# Patient Record
Sex: FEMALE | Race: WHITE | Employment: FULL TIME | URBAN - METROPOLITAN AREA
[De-identification: names, ages, dates, MRNs, and addresses within clinical notes are randomized per-mention and may not be internally consistent; named-entity substitution may affect disease eponyms.]

---

## 2017-02-21 ENCOUNTER — HOSPITAL ENCOUNTER (OUTPATIENT)
Dept: MAMMOGRAPHY | Age: 67
Discharge: HOME | End: 2017-02-21
Attending: INTERNAL MEDICINE | Admitting: INTERNAL MEDICINE

## 2017-02-21 ENCOUNTER — HOSPITAL ENCOUNTER (OUTPATIENT)
Dept: OTHER | Age: 67
Discharge: HOME | End: 2017-02-21
Attending: NURSE PRACTITIONER | Admitting: NURSE PRACTITIONER

## 2017-02-21 DIAGNOSIS — Z80.3 FAMILY HISTORY OF BREAST CANCER IN MOTHER: ICD-10-CM

## 2017-02-21 DIAGNOSIS — Z12.31 ENCOUNTER FOR SCREENING MAMMOGRAM FOR BREAST CANCER: ICD-10-CM

## 2017-02-21 LAB
ALBUMIN SERPL-MCNC: 4 G/DL (ref 3.4–5)
ALP BLD-CCNC: 80 U/L (ref 40–129)
ALT SERPL-CCNC: 13 U/L (ref 10–40)
ANION GAP SERPL CALCULATED.3IONS-SCNC: 15 MMOL/L (ref 3–16)
AST SERPL-CCNC: 18 U/L (ref 15–37)
BACTERIA: ABNORMAL /HPF
BASOPHILS ABSOLUTE: 0 K/UL (ref 0–0.2)
BASOPHILS RELATIVE PERCENT: 0.1 %
BILIRUB SERPL-MCNC: 0.5 MG/DL (ref 0–1)
BILIRUBIN DIRECT: <0.2 MG/DL (ref 0–0.3)
BILIRUBIN URINE: NEGATIVE
BILIRUBIN, INDIRECT: NORMAL MG/DL (ref 0–1)
BLOOD, URINE: ABNORMAL
BUN BLDV-MCNC: 18 MG/DL (ref 7–20)
CALCIUM SERPL-MCNC: 9.2 MG/DL (ref 8.3–10.6)
CHLORIDE BLD-SCNC: 103 MMOL/L (ref 99–110)
CLARITY: CLEAR
CO2: 23 MMOL/L (ref 21–32)
COLOR: YELLOW
CREAT SERPL-MCNC: 0.8 MG/DL (ref 0.6–1.2)
CREATININE URINE: 16.8 MG/DL (ref 28–259)
EOSINOPHILS ABSOLUTE: 0.1 K/UL (ref 0–0.6)
EOSINOPHILS RELATIVE PERCENT: 1.9 %
EPITHELIAL CELLS, UA: 1 /HPF (ref 0–5)
GFR AFRICAN AMERICAN: >60
GFR NON-AFRICAN AMERICAN: >60
GLUCOSE BLD-MCNC: 112 MG/DL (ref 70–99)
GLUCOSE URINE: NEGATIVE MG/DL
HCT VFR BLD CALC: 40.9 % (ref 36–48)
HEMOGLOBIN: 13.5 G/DL (ref 12–16)
HYALINE CASTS: 1 /HPF (ref 0–8)
KETONES, URINE: NEGATIVE MG/DL
LEUKOCYTE ESTERASE, URINE: NEGATIVE
LYMPHOCYTES ABSOLUTE: 0.8 K/UL (ref 1–5.1)
LYMPHOCYTES RELATIVE PERCENT: 15.8 %
MAGNESIUM: 1.8 MG/DL (ref 1.8–2.4)
MCH RBC QN AUTO: 28.6 PG (ref 26–34)
MCHC RBC AUTO-ENTMCNC: 32.9 G/DL (ref 31–36)
MCV RBC AUTO: 86.9 FL (ref 80–100)
MICROSCOPIC EXAMINATION: YES
MONOCYTES ABSOLUTE: 0.6 K/UL (ref 0–1.3)
MONOCYTES RELATIVE PERCENT: 10.9 %
NEUTROPHILS ABSOLUTE: 3.7 K/UL (ref 1.7–7.7)
NEUTROPHILS RELATIVE PERCENT: 71.3 %
NITRITE, URINE: NEGATIVE
PDW BLD-RTO: 13.5 % (ref 12.4–15.4)
PH UA: 6.5
PHOSPHORUS: 3 MG/DL (ref 2.5–4.9)
PLATELET # BLD: 196 K/UL (ref 135–450)
PMV BLD AUTO: 8.8 FL (ref 5–10.5)
POTASSIUM SERPL-SCNC: 4.6 MMOL/L (ref 3.5–5.1)
PROTEIN PROTEIN: 54 MG/DL
PROTEIN UA: 100 MG/DL
RBC # BLD: 4.71 M/UL (ref 4–5.2)
RBC UA: 3 /HPF (ref 0–4)
SODIUM BLD-SCNC: 141 MMOL/L (ref 136–145)
SPECIFIC GRAVITY UA: 1.01
TACROLIMUS BLOOD: 6.4 NG/ML (ref 5–20)
TOTAL PROTEIN: 6.6 G/DL (ref 6.4–8.2)
URINE REFLEX TO CULTURE: ABNORMAL
URINE TYPE: ABNORMAL
UROBILINOGEN, URINE: 0.2 E.U./DL
WBC # BLD: 5.2 K/UL (ref 4–11)
WBC UA: 5 /HPF (ref 0–5)

## 2017-06-09 ENCOUNTER — HOSPITAL ENCOUNTER (OUTPATIENT)
Dept: OTHER | Age: 67
Discharge: HOME | End: 2017-06-09
Attending: NURSE PRACTITIONER | Admitting: NURSE PRACTITIONER

## 2017-06-09 LAB
ALBUMIN SERPL-MCNC: 4.1 G/DL (ref 3.4–5)
ALP BLD-CCNC: 77 U/L (ref 40–129)
ALT SERPL-CCNC: 13 U/L (ref 10–40)
ANION GAP SERPL CALCULATED.3IONS-SCNC: 11 MMOL/L (ref 3–16)
AST SERPL-CCNC: 14 U/L (ref 15–37)
BACTERIA: ABNORMAL /HPF
BASOPHILS ABSOLUTE: 0 K/UL (ref 0–0.2)
BASOPHILS RELATIVE PERCENT: 0.2 %
BILIRUB SERPL-MCNC: 0.4 MG/DL (ref 0–1)
BILIRUBIN DIRECT: <0.2 MG/DL (ref 0–0.3)
BILIRUBIN URINE: NEGATIVE
BILIRUBIN, INDIRECT: ABNORMAL MG/DL (ref 0–1)
BLOOD, URINE: ABNORMAL
BUN BLDV-MCNC: 27 MG/DL (ref 7–20)
CALCIUM SERPL-MCNC: 9.2 MG/DL (ref 8.3–10.6)
CHLORIDE BLD-SCNC: 107 MMOL/L (ref 99–110)
CHOLESTEROL, TOTAL: 163 MG/DL (ref 0–199)
CLARITY: CLEAR
CO2: 27 MMOL/L (ref 21–32)
COLOR: YELLOW
CREAT SERPL-MCNC: 0.9 MG/DL (ref 0.6–1.2)
CREATININE URINE: 35.8 MG/DL (ref 28–259)
EOSINOPHILS ABSOLUTE: 0 K/UL (ref 0–0.6)
EOSINOPHILS RELATIVE PERCENT: 0.8 %
EPITHELIAL CELLS, UA: 1 /HPF (ref 0–5)
GFR AFRICAN AMERICAN: >60
GFR NON-AFRICAN AMERICAN: >60
GLUCOSE BLD-MCNC: 132 MG/DL (ref 70–99)
GLUCOSE URINE: NEGATIVE MG/DL
HCT VFR BLD CALC: 42.1 % (ref 36–48)
HDLC SERPL-MCNC: 62 MG/DL (ref 40–60)
HEMOGLOBIN: 13.7 G/DL (ref 12–16)
HYALINE CASTS: 0 /LPF (ref 0–8)
KETONES, URINE: NEGATIVE MG/DL
LDL CHOLESTEROL CALCULATED: 80 MG/DL
LEUKOCYTE ESTERASE, URINE: ABNORMAL
LYMPHOCYTES ABSOLUTE: 1 K/UL (ref 1–5.1)
LYMPHOCYTES RELATIVE PERCENT: 17 %
MAGNESIUM: 2.1 MG/DL (ref 1.8–2.4)
MCH RBC QN AUTO: 28.2 PG (ref 26–34)
MCHC RBC AUTO-ENTMCNC: 32.7 G/DL (ref 31–36)
MCV RBC AUTO: 86.2 FL (ref 80–100)
MICROSCOPIC EXAMINATION: YES
MONOCYTES ABSOLUTE: 0.5 K/UL (ref 0–1.3)
MONOCYTES RELATIVE PERCENT: 9.2 %
NEUTROPHILS ABSOLUTE: 4.1 K/UL (ref 1.7–7.7)
NEUTROPHILS RELATIVE PERCENT: 72.8 %
NITRITE, URINE: NEGATIVE
PDW BLD-RTO: 13.6 % (ref 12.4–15.4)
PH UA: 6.5
PHOSPHORUS: 3.5 MG/DL (ref 2.5–4.9)
PLATELET # BLD: 196 K/UL (ref 135–450)
PMV BLD AUTO: 8.6 FL (ref 5–10.5)
POTASSIUM SERPL-SCNC: 5.5 MMOL/L (ref 3.5–5.1)
PROTEIN PROTEIN: 14 MG/DL
PROTEIN UA: NEGATIVE MG/DL
RBC # BLD: 4.88 M/UL (ref 4–5.2)
RBC UA: 3 /HPF (ref 0–4)
SODIUM BLD-SCNC: 145 MMOL/L (ref 136–145)
SPECIFIC GRAVITY UA: 1.01
TACROLIMUS BLOOD: 5.8 NG/ML (ref 5–20)
TOTAL PROTEIN: 6.7 G/DL (ref 6.4–8.2)
TRIGL SERPL-MCNC: 106 MG/DL (ref 0–150)
URINE REFLEX TO CULTURE: YES
URINE TYPE: ABNORMAL
UROBILINOGEN, URINE: 0.2 E.U./DL
VLDLC SERPL CALC-MCNC: 21 MG/DL
WBC # BLD: 5.6 K/UL (ref 4–11)
WBC UA: 5 /HPF (ref 0–5)

## 2017-06-11 LAB
ORGANISM: ABNORMAL
URINE CULTURE, ROUTINE: ABNORMAL

## 2017-07-01 ENCOUNTER — HOSPITAL ENCOUNTER (OUTPATIENT)
Dept: OTHER | Age: 67
Discharge: HOME | End: 2017-07-01
Attending: NURSE PRACTITIONER | Admitting: NURSE PRACTITIONER

## 2017-07-01 LAB
ALBUMIN SERPL-MCNC: 4.2 G/DL (ref 3.4–5)
ANION GAP SERPL CALCULATED.3IONS-SCNC: 17 MMOL/L (ref 3–16)
BUN BLDV-MCNC: 22 MG/DL (ref 7–20)
CALCIUM SERPL-MCNC: 9.7 MG/DL (ref 8.3–10.6)
CHLORIDE BLD-SCNC: 107 MMOL/L (ref 99–110)
CO2: 23 MMOL/L (ref 21–32)
CREAT SERPL-MCNC: 0.8 MG/DL (ref 0.6–1.2)
GFR AFRICAN AMERICAN: >60
GFR NON-AFRICAN AMERICAN: >60
GLUCOSE BLD-MCNC: 124 MG/DL (ref 70–99)
PHOSPHORUS: 2.9 MG/DL (ref 2.5–4.9)
POTASSIUM SERPL-SCNC: 4.6 MMOL/L (ref 3.5–5.1)
SODIUM BLD-SCNC: 147 MMOL/L (ref 136–145)

## 2017-09-08 ENCOUNTER — HOSPITAL ENCOUNTER (OUTPATIENT)
Dept: OTHER | Age: 67
Discharge: HOME | End: 2017-09-08
Attending: NURSE PRACTITIONER | Admitting: NURSE PRACTITIONER

## 2017-09-08 LAB
ALBUMIN SERPL-MCNC: 4.1 G/DL (ref 3.4–5)
ALP BLD-CCNC: 73 U/L (ref 40–129)
ALT SERPL-CCNC: 15 U/L (ref 10–40)
ANION GAP SERPL CALCULATED.3IONS-SCNC: 15 MMOL/L (ref 3–16)
AST SERPL-CCNC: 18 U/L (ref 15–37)
BASOPHILS ABSOLUTE: 0 K/UL (ref 0–0.2)
BASOPHILS RELATIVE PERCENT: 0.2 %
BILIRUB SERPL-MCNC: 0.6 MG/DL (ref 0–1)
BILIRUBIN DIRECT: <0.2 MG/DL (ref 0–0.3)
BILIRUBIN URINE: NEGATIVE
BILIRUBIN, INDIRECT: NORMAL MG/DL (ref 0–1)
BLOOD, URINE: ABNORMAL
BUN BLDV-MCNC: 24 MG/DL (ref 7–20)
CALCIUM SERPL-MCNC: 9.7 MG/DL (ref 8.3–10.6)
CHLORIDE BLD-SCNC: 101 MMOL/L (ref 99–110)
CHOLESTEROL, TOTAL: 138 MG/DL (ref 0–199)
CLARITY: ABNORMAL
CO2: 25 MMOL/L (ref 21–32)
COLOR: YELLOW
CREAT SERPL-MCNC: 0.8 MG/DL (ref 0.6–1.2)
CREATININE URINE: 151.6 MG/DL (ref 28–259)
EOSINOPHILS ABSOLUTE: 0.1 K/UL (ref 0–0.6)
EOSINOPHILS RELATIVE PERCENT: 1.9 %
EPITHELIAL CELLS, UA: 8 /HPF (ref 0–5)
GFR AFRICAN AMERICAN: >60
GFR NON-AFRICAN AMERICAN: >60
GLUCOSE BLD-MCNC: 125 MG/DL (ref 70–99)
GLUCOSE URINE: NEGATIVE MG/DL
HCT VFR BLD CALC: 41 % (ref 36–48)
HDLC SERPL-MCNC: 50 MG/DL (ref 40–60)
HEMOGLOBIN: 13.7 G/DL (ref 12–16)
KETONES, URINE: NEGATIVE MG/DL
LDL CHOLESTEROL CALCULATED: 67 MG/DL
LEUKOCYTE ESTERASE, URINE: ABNORMAL
LYMPHOCYTES ABSOLUTE: 0.9 K/UL (ref 1–5.1)
LYMPHOCYTES RELATIVE PERCENT: 20.9 %
MAGNESIUM: 1.8 MG/DL (ref 1.8–2.4)
MCH RBC QN AUTO: 29.3 PG (ref 26–34)
MCHC RBC AUTO-ENTMCNC: 33.4 G/DL (ref 31–36)
MCV RBC AUTO: 87.5 FL (ref 80–100)
MICROSCOPIC EXAMINATION: YES
MONOCYTES ABSOLUTE: 0.5 K/UL (ref 0–1.3)
MONOCYTES RELATIVE PERCENT: 12.2 %
NEUTROPHILS ABSOLUTE: 2.9 K/UL (ref 1.7–7.7)
NEUTROPHILS RELATIVE PERCENT: 64.8 %
NITRITE, URINE: NEGATIVE
PDW BLD-RTO: 13.7 % (ref 12.4–15.4)
PH UA: 6
PHOSPHORUS: 3.4 MG/DL (ref 2.5–4.9)
PLATELET # BLD: 182 K/UL (ref 135–450)
PMV BLD AUTO: 8.8 FL (ref 5–10.5)
POTASSIUM SERPL-SCNC: 4.1 MMOL/L (ref 3.5–5.1)
PROTEIN PROTEIN: 112 MG/DL
PROTEIN UA: 100 MG/DL
RBC # BLD: 4.69 M/UL (ref 4–5.2)
RBC UA: 3 /HPF (ref 0–4)
SODIUM BLD-SCNC: 141 MMOL/L (ref 136–145)
SPECIFIC GRAVITY UA: 1.02
TACROLIMUS BLOOD: 14.4 NG/ML (ref 5–20)
TOTAL PROTEIN: 6.7 G/DL (ref 6.4–8.2)
TRIGL SERPL-MCNC: 103 MG/DL (ref 0–150)
URINE REFLEX TO CULTURE: YES
URINE TYPE: ABNORMAL
UROBILINOGEN, URINE: 0.2 E.U./DL
VLDLC SERPL CALC-MCNC: 21 MG/DL
WBC # BLD: 4.4 K/UL (ref 4–11)
WBC UA: 19 /HPF (ref 0–5)

## 2017-09-10 LAB
ORGANISM: ABNORMAL
URINE CULTURE, ROUTINE: ABNORMAL

## 2017-09-14 ENCOUNTER — HOSPITAL ENCOUNTER (OUTPATIENT)
Dept: OTHER | Age: 67
Discharge: HOME | End: 2017-09-14
Attending: NURSE PRACTITIONER | Admitting: NURSE PRACTITIONER

## 2017-09-14 LAB — TACROLIMUS BLOOD: 5.1 NG/ML (ref 5–20)

## 2018-01-19 ENCOUNTER — HOSPITAL ENCOUNTER (OUTPATIENT)
Dept: OTHER | Age: 68
Discharge: HOME | End: 2018-01-19
Attending: NURSE PRACTITIONER | Admitting: NURSE PRACTITIONER

## 2018-01-19 LAB
ALBUMIN SERPL-MCNC: 4.1 G/DL (ref 3.4–5)
ALP BLD-CCNC: 73 U/L (ref 40–129)
ALT SERPL-CCNC: 12 U/L (ref 10–40)
ANION GAP SERPL CALCULATED.3IONS-SCNC: 15 MMOL/L (ref 3–16)
AST SERPL-CCNC: 16 U/L (ref 15–37)
BASOPHILS ABSOLUTE: 0 K/UL (ref 0–0.2)
BASOPHILS RELATIVE PERCENT: 0.2 %
BILIRUB SERPL-MCNC: 0.5 MG/DL (ref 0–1)
BILIRUBIN DIRECT: <0.2 MG/DL (ref 0–0.3)
BILIRUBIN URINE: NEGATIVE
BILIRUBIN, INDIRECT: NORMAL MG/DL (ref 0–1)
BLOOD, URINE: NEGATIVE
BUN BLDV-MCNC: 23 MG/DL (ref 7–20)
CALCIUM SERPL-MCNC: 9.2 MG/DL (ref 8.3–10.6)
CHLORIDE BLD-SCNC: 104 MMOL/L (ref 99–110)
CHOLESTEROL, TOTAL: 131 MG/DL (ref 0–199)
CLARITY: CLEAR
CO2: 21 MMOL/L (ref 21–32)
COLOR: YELLOW
CREAT SERPL-MCNC: 1 MG/DL (ref 0.6–1.2)
CREATININE URINE: 27.9 MG/DL (ref 28–259)
EOSINOPHILS ABSOLUTE: 0.1 K/UL (ref 0–0.6)
EOSINOPHILS RELATIVE PERCENT: 1.9 %
EPITHELIAL CELLS, UA: 4 /HPF (ref 0–5)
ESTIMATED AVERAGE GLUCOSE: 125.5 MG/DL
GFR AFRICAN AMERICAN: >60
GFR NON-AFRICAN AMERICAN: 55
GLUCOSE BLD-MCNC: 120 MG/DL (ref 70–99)
GLUCOSE URINE: NEGATIVE MG/DL
HBA1C MFR BLD: 6 %
HCT VFR BLD CALC: 40 % (ref 36–48)
HDLC SERPL-MCNC: 40 MG/DL (ref 40–60)
HEMOGLOBIN: 13.3 G/DL (ref 12–16)
HYALINE CASTS: 2 /LPF (ref 0–8)
KETONES, URINE: NEGATIVE MG/DL
LDL CHOLESTEROL CALCULATED: 64 MG/DL
LEUKOCYTE ESTERASE, URINE: ABNORMAL
LYMPHOCYTES ABSOLUTE: 0.9 K/UL (ref 1–5.1)
LYMPHOCYTES RELATIVE PERCENT: 21.1 %
MAGNESIUM: 1.8 MG/DL (ref 1.8–2.4)
MCH RBC QN AUTO: 29 PG (ref 26–34)
MCHC RBC AUTO-ENTMCNC: 33.3 G/DL (ref 31–36)
MCV RBC AUTO: 87.2 FL (ref 80–100)
MICROSCOPIC EXAMINATION: YES
MONOCYTES ABSOLUTE: 0.6 K/UL (ref 0–1.3)
MONOCYTES RELATIVE PERCENT: 13.7 %
NEUTROPHILS ABSOLUTE: 2.7 K/UL (ref 1.7–7.7)
NEUTROPHILS RELATIVE PERCENT: 63.1 %
NITRITE, URINE: NEGATIVE
PDW BLD-RTO: 13.5 % (ref 12.4–15.4)
PH UA: 6
PHOSPHORUS: 2.5 MG/DL (ref 2.5–4.9)
PLATELET # BLD: 182 K/UL (ref 135–450)
PMV BLD AUTO: 8.6 FL (ref 5–10.5)
POTASSIUM SERPL-SCNC: 3.5 MMOL/L (ref 3.5–5.1)
PROTEIN PROTEIN: 8 MG/DL
PROTEIN UA: NEGATIVE MG/DL
RBC # BLD: 4.59 M/UL (ref 4–5.2)
RBC UA: 1 /HPF (ref 0–4)
SODIUM BLD-SCNC: 140 MMOL/L (ref 136–145)
SPECIFIC GRAVITY UA: 1.01
TACROLIMUS BLOOD: 6.4 NG/ML (ref 5–20)
TOTAL PROTEIN: 6.6 G/DL (ref 6.4–8.2)
TRIGL SERPL-MCNC: 133 MG/DL (ref 0–150)
URINE REFLEX TO CULTURE: YES
URINE TYPE: ABNORMAL
UROBILINOGEN, URINE: 0.2 E.U./DL
VLDLC SERPL CALC-MCNC: 27 MG/DL
WBC # BLD: 4.3 K/UL (ref 4–11)
WBC UA: 20 /HPF (ref 0–5)

## 2018-01-21 LAB
ORGANISM: ABNORMAL
URINE CULTURE, ROUTINE: ABNORMAL
URINE CULTURE, ROUTINE: ABNORMAL

## 2018-06-08 ENCOUNTER — HOSPITAL ENCOUNTER (OUTPATIENT)
Dept: OTHER | Age: 68
Discharge: HOME | End: 2018-06-08
Attending: NURSE PRACTITIONER | Admitting: NURSE PRACTITIONER

## 2018-06-08 LAB
ALBUMIN SERPL-MCNC: 4.4 G/DL (ref 3.4–5)
ALP BLD-CCNC: 81 U/L (ref 40–129)
ALT SERPL-CCNC: 14 U/L (ref 10–40)
ANION GAP SERPL CALCULATED.3IONS-SCNC: 15 MMOL/L (ref 3–16)
AST SERPL-CCNC: 20 U/L (ref 15–37)
BASOPHILS ABSOLUTE: 0 K/UL (ref 0–0.2)
BASOPHILS RELATIVE PERCENT: 0.2 %
BILIRUB SERPL-MCNC: 0.5 MG/DL (ref 0–1)
BILIRUBIN DIRECT: <0.2 MG/DL (ref 0–0.3)
BILIRUBIN URINE: NEGATIVE
BILIRUBIN, INDIRECT: NORMAL MG/DL (ref 0–1)
BLOOD, URINE: NEGATIVE
BUN BLDV-MCNC: 24 MG/DL (ref 7–20)
CALCIUM SERPL-MCNC: 9.5 MG/DL (ref 8.3–10.6)
CHLORIDE BLD-SCNC: 106 MMOL/L (ref 99–110)
CHOLESTEROL, TOTAL: 131 MG/DL (ref 0–199)
CLARITY: CLEAR
CO2: 24 MMOL/L (ref 21–32)
COLOR: YELLOW
CREAT SERPL-MCNC: 0.9 MG/DL (ref 0.6–1.2)
CREATININE URINE: 20.8 MG/DL (ref 28–259)
EOSINOPHILS ABSOLUTE: 0.1 K/UL (ref 0–0.6)
EOSINOPHILS RELATIVE PERCENT: 1.8 %
EPITHELIAL CELLS, UA: 2 /HPF (ref 0–5)
ESTIMATED AVERAGE GLUCOSE: 125.5 MG/DL
GFR AFRICAN AMERICAN: >60
GFR NON-AFRICAN AMERICAN: >60
GLUCOSE BLD-MCNC: 129 MG/DL (ref 70–99)
GLUCOSE URINE: NEGATIVE MG/DL
HBA1C MFR BLD: 6 %
HCT VFR BLD CALC: 41.4 % (ref 36–48)
HDLC SERPL-MCNC: 48 MG/DL (ref 40–60)
HEMOGLOBIN: 13.8 G/DL (ref 12–16)
HYALINE CASTS: 0 /LPF (ref 0–8)
KETONES, URINE: NEGATIVE MG/DL
LDL CHOLESTEROL CALCULATED: 61 MG/DL
LEUKOCYTE ESTERASE, URINE: ABNORMAL
LYMPHOCYTES ABSOLUTE: 0.9 K/UL (ref 1–5.1)
LYMPHOCYTES RELATIVE PERCENT: 21.7 %
MAGNESIUM: 1.9 MG/DL (ref 1.8–2.4)
MCH RBC QN AUTO: 29 PG (ref 26–34)
MCHC RBC AUTO-ENTMCNC: 33.4 G/DL (ref 31–36)
MCV RBC AUTO: 86.9 FL (ref 80–100)
MICROSCOPIC EXAMINATION: YES
MONOCYTES ABSOLUTE: 0.5 K/UL (ref 0–1.3)
MONOCYTES RELATIVE PERCENT: 11 %
NEUTROPHILS ABSOLUTE: 2.8 K/UL (ref 1.7–7.7)
NEUTROPHILS RELATIVE PERCENT: 65.3 %
NITRITE, URINE: NEGATIVE
PDW BLD-RTO: 13.8 % (ref 12.4–15.4)
PH UA: 6
PHOSPHORUS: 3.7 MG/DL (ref 2.5–4.9)
PLATELET # BLD: 193 K/UL (ref 135–450)
PMV BLD AUTO: 8.8 FL (ref 5–10.5)
POTASSIUM SERPL-SCNC: 4.4 MMOL/L (ref 3.5–5.1)
PROTEIN PROTEIN: 13 MG/DL
PROTEIN UA: NEGATIVE MG/DL
RBC # BLD: 4.76 M/UL (ref 4–5.2)
RBC UA: 2 /HPF (ref 0–4)
SODIUM BLD-SCNC: 145 MMOL/L (ref 136–145)
SPECIFIC GRAVITY UA: 1.01
TACROLIMUS BLOOD: 8.1 NG/ML (ref 5–20)
TOTAL PROTEIN: 6.9 G/DL (ref 6.4–8.2)
TRIGL SERPL-MCNC: 109 MG/DL (ref 0–150)
URINE REFLEX TO CULTURE: YES
URINE TYPE: ABNORMAL
UROBILINOGEN, URINE: 0.2 E.U./DL
VLDLC SERPL CALC-MCNC: 22 MG/DL
WBC # BLD: 4.3 K/UL (ref 4–11)
WBC UA: 4 /HPF (ref 0–5)

## 2018-06-09 LAB — URINE CULTURE, ROUTINE: NORMAL

## 2018-09-07 ENCOUNTER — HOSPITAL ENCOUNTER (OUTPATIENT)
Dept: OTHER | Age: 68
Discharge: HOME | End: 2018-09-07
Attending: NURSE PRACTITIONER | Admitting: NURSE PRACTITIONER

## 2018-09-07 LAB
ALBUMIN SERPL-MCNC: 4.1 G/DL (ref 3.4–5)
ALP BLD-CCNC: 86 U/L (ref 40–129)
ALT SERPL-CCNC: 13 U/L (ref 10–40)
ANION GAP SERPL CALCULATED.3IONS-SCNC: 13 MMOL/L (ref 3–16)
AST SERPL-CCNC: 17 U/L (ref 15–37)
BASOPHILS ABSOLUTE: 0 K/UL (ref 0–0.2)
BASOPHILS RELATIVE PERCENT: 0.2 %
BILIRUB SERPL-MCNC: 0.4 MG/DL (ref 0–1)
BILIRUBIN DIRECT: <0.2 MG/DL (ref 0–0.3)
BILIRUBIN URINE: NEGATIVE
BILIRUBIN, INDIRECT: NORMAL MG/DL (ref 0–1)
BLOOD, URINE: ABNORMAL
BUN BLDV-MCNC: 22 MG/DL (ref 7–20)
CALCIUM SERPL-MCNC: 9.4 MG/DL (ref 8.3–10.6)
CHLORIDE BLD-SCNC: 105 MMOL/L (ref 99–110)
CHOLESTEROL, TOTAL: 131 MG/DL (ref 0–199)
CLARITY: CLEAR
CO2: 23 MMOL/L (ref 21–32)
COLOR: YELLOW
CREAT SERPL-MCNC: 0.9 MG/DL (ref 0.6–1.2)
CREATININE URINE: 34.9 MG/DL (ref 28–259)
EOSINOPHILS ABSOLUTE: 0.1 K/UL (ref 0–0.6)
EOSINOPHILS RELATIVE PERCENT: 1.7 %
EPITHELIAL CELLS, UA: 1 /HPF (ref 0–5)
ESTIMATED AVERAGE GLUCOSE: 116.9 MG/DL
GFR AFRICAN AMERICAN: >60
GFR NON-AFRICAN AMERICAN: >60
GLUCOSE BLD-MCNC: 125 MG/DL (ref 70–99)
GLUCOSE URINE: NEGATIVE MG/DL
HBA1C MFR BLD: 5.7 %
HCT VFR BLD CALC: 41 % (ref 36–48)
HDLC SERPL-MCNC: 40 MG/DL (ref 40–60)
HEMOGLOBIN: 13.5 G/DL (ref 12–16)
HYALINE CASTS: 0 /LPF (ref 0–8)
KETONES, URINE: NEGATIVE MG/DL
LDL CHOLESTEROL CALCULATED: 67 MG/DL
LEUKOCYTE ESTERASE, URINE: ABNORMAL
LYMPHOCYTES ABSOLUTE: 0.9 K/UL (ref 1–5.1)
LYMPHOCYTES RELATIVE PERCENT: 22.2 %
MAGNESIUM: 1.7 MG/DL (ref 1.8–2.4)
MCH RBC QN AUTO: 28.4 PG (ref 26–34)
MCHC RBC AUTO-ENTMCNC: 33 G/DL (ref 31–36)
MCV RBC AUTO: 86.1 FL (ref 80–100)
MICROSCOPIC EXAMINATION: YES
MONOCYTES ABSOLUTE: 0.5 K/UL (ref 0–1.3)
MONOCYTES RELATIVE PERCENT: 13 %
NEUTROPHILS ABSOLUTE: 2.6 K/UL (ref 1.7–7.7)
NEUTROPHILS RELATIVE PERCENT: 62.9 %
NITRITE, URINE: NEGATIVE
PDW BLD-RTO: 13.9 % (ref 12.4–15.4)
PH UA: 6
PHOSPHORUS: 3.5 MG/DL (ref 2.5–4.9)
PLATELET # BLD: 184 K/UL (ref 135–450)
PMV BLD AUTO: 8.6 FL (ref 5–10.5)
POTASSIUM SERPL-SCNC: 4.1 MMOL/L (ref 3.5–5.1)
PROTEIN PROTEIN: 14 MG/DL
PROTEIN UA: ABNORMAL MG/DL
RBC # BLD: 4.76 M/UL (ref 4–5.2)
RBC UA: ABNORMAL /HPF (ref 0–2)
SODIUM BLD-SCNC: 141 MMOL/L (ref 136–145)
SPECIFIC GRAVITY UA: 1.01
TACROLIMUS BLOOD: 7.9 NG/ML (ref 5–20)
TOTAL PROTEIN: 6.5 G/DL (ref 6.4–8.2)
TRIGL SERPL-MCNC: 122 MG/DL (ref 0–150)
URINE REFLEX TO CULTURE: YES
URINE TYPE: ABNORMAL
UROBILINOGEN, URINE: 0.2 E.U./DL
VLDLC SERPL CALC-MCNC: 24 MG/DL
WBC # BLD: 4.1 K/UL (ref 4–11)
WBC UA: 25 /HPF (ref 0–5)

## 2018-09-08 LAB — URINE CULTURE, ROUTINE: NORMAL

## 2018-12-31 ENCOUNTER — HOSPITAL ENCOUNTER (OUTPATIENT)
Age: 68
Discharge: HOME | End: 2018-12-31
Payer: MEDICARE

## 2018-12-31 LAB
ALBUMIN SERPL-MCNC: 4.4 G/DL (ref 3.4–5)
ALP BLD-CCNC: 78 U/L (ref 40–129)
ALT SERPL-CCNC: 13 U/L (ref 10–40)
ANION GAP SERPL CALCULATED.3IONS-SCNC: 15 MMOL/L (ref 3–16)
AST SERPL-CCNC: 20 U/L (ref 15–37)
BASOPHILS ABSOLUTE: 0 K/UL (ref 0–0.2)
BASOPHILS RELATIVE PERCENT: 0.1 %
BILIRUB SERPL-MCNC: 0.6 MG/DL (ref 0–1)
BILIRUBIN DIRECT: <0.2 MG/DL (ref 0–0.3)
BILIRUBIN URINE: NEGATIVE
BILIRUBIN, INDIRECT: NORMAL MG/DL (ref 0–1)
BLOOD, URINE: ABNORMAL
BUN BLDV-MCNC: 10 MG/DL (ref 7–20)
CALCIUM SERPL-MCNC: 9.8 MG/DL (ref 8.3–10.6)
CHLORIDE BLD-SCNC: 104 MMOL/L (ref 99–110)
CHOLESTEROL, TOTAL: 149 MG/DL (ref 0–199)
CLARITY: CLEAR
CO2: 24 MMOL/L (ref 21–32)
COLOR: YELLOW
CREAT SERPL-MCNC: 0.9 MG/DL (ref 0.6–1.2)
CREATININE URINE: 28 MG/DL (ref 28–259)
EOSINOPHILS ABSOLUTE: 0.1 K/UL (ref 0–0.6)
EOSINOPHILS RELATIVE PERCENT: 1.4 %
EPITHELIAL CELLS, UA: 2 /HPF (ref 0–5)
GFR AFRICAN AMERICAN: >60
GFR NON-AFRICAN AMERICAN: >60
GLUCOSE BLD-MCNC: 127 MG/DL (ref 70–99)
GLUCOSE URINE: NEGATIVE MG/DL
HCT VFR BLD CALC: 42.9 % (ref 36–48)
HDLC SERPL-MCNC: 51 MG/DL (ref 40–60)
HEMOGLOBIN: 14.3 G/DL (ref 12–16)
HYALINE CASTS: 0 /LPF (ref 0–8)
KETONES, URINE: NEGATIVE MG/DL
LDL CHOLESTEROL CALCULATED: 80 MG/DL
LEUKOCYTE ESTERASE, URINE: ABNORMAL
LYMPHOCYTES ABSOLUTE: 0.9 K/UL (ref 1–5.1)
LYMPHOCYTES RELATIVE PERCENT: 19.2 %
MAGNESIUM: 1.8 MG/DL (ref 1.8–2.4)
MCH RBC QN AUTO: 28.9 PG (ref 26–34)
MCHC RBC AUTO-ENTMCNC: 33.3 G/DL (ref 31–36)
MCV RBC AUTO: 86.7 FL (ref 80–100)
MICROSCOPIC EXAMINATION: YES
MONOCYTES ABSOLUTE: 0.5 K/UL (ref 0–1.3)
MONOCYTES RELATIVE PERCENT: 10.3 %
NEUTROPHILS ABSOLUTE: 3.1 K/UL (ref 1.7–7.7)
NEUTROPHILS RELATIVE PERCENT: 69 %
NITRITE, URINE: NEGATIVE
PDW BLD-RTO: 13.8 % (ref 12.4–15.4)
PH UA: 6
PHOSPHORUS: 2.9 MG/DL (ref 2.5–4.9)
PLATELET # BLD: 187 K/UL (ref 135–450)
PMV BLD AUTO: 8.5 FL (ref 5–10.5)
POTASSIUM SERPL-SCNC: 4.2 MMOL/L (ref 3.5–5.1)
PROTEIN PROTEIN: 31 MG/DL
PROTEIN UA: 30 MG/DL
RBC # BLD: 4.95 M/UL (ref 4–5.2)
RBC UA: 2 /HPF (ref 0–4)
SODIUM BLD-SCNC: 143 MMOL/L (ref 136–145)
SPECIFIC GRAVITY UA: 1.01
TOTAL PROTEIN: 7.1 G/DL (ref 6.4–8.2)
TRIGL SERPL-MCNC: 91 MG/DL (ref 0–150)
URINE REFLEX TO CULTURE: YES
URINE TYPE: ABNORMAL
UROBILINOGEN, URINE: 0.2 E.U./DL
VLDLC SERPL CALC-MCNC: 18 MG/DL
WBC # BLD: 4.5 K/UL (ref 4–11)
WBC UA: 12 /HPF (ref 0–5)

## 2018-12-31 PROCEDURE — 82570 ASSAY OF URINE CREATININE: CPT

## 2018-12-31 PROCEDURE — 80048 BASIC METABOLIC PNL TOTAL CA: CPT

## 2018-12-31 PROCEDURE — 83036 HEMOGLOBIN GLYCOSYLATED A1C: CPT

## 2018-12-31 PROCEDURE — 85025 COMPLETE CBC W/AUTO DIFF WBC: CPT

## 2018-12-31 PROCEDURE — 84100 ASSAY OF PHOSPHORUS: CPT

## 2018-12-31 PROCEDURE — 80076 HEPATIC FUNCTION PANEL: CPT

## 2018-12-31 PROCEDURE — 83735 ASSAY OF MAGNESIUM: CPT

## 2018-12-31 PROCEDURE — 87086 URINE CULTURE/COLONY COUNT: CPT

## 2018-12-31 PROCEDURE — 80061 LIPID PANEL: CPT

## 2018-12-31 PROCEDURE — 81001 URINALYSIS AUTO W/SCOPE: CPT

## 2018-12-31 PROCEDURE — 80197 ASSAY OF TACROLIMUS: CPT

## 2018-12-31 PROCEDURE — 84156 ASSAY OF PROTEIN URINE: CPT

## 2018-12-31 PROCEDURE — 36415 COLL VENOUS BLD VENIPUNCTURE: CPT

## 2019-01-01 LAB
ESTIMATED AVERAGE GLUCOSE: 128.4 MG/DL
HBA1C MFR BLD: 6.1 %
TACROLIMUS BLOOD: 8.8 NG/ML (ref 5–20)
URINE CULTURE, ROUTINE: NORMAL

## 2019-03-16 ENCOUNTER — HOSPITAL ENCOUNTER (OUTPATIENT)
Age: 69
Discharge: HOME | End: 2019-03-16
Payer: MEDICARE

## 2019-03-16 LAB
ALBUMIN SERPL-MCNC: 4.1 G/DL (ref 3.4–5)
ALP BLD-CCNC: 94 U/L (ref 40–129)
ALT SERPL-CCNC: 13 U/L (ref 10–40)
ANION GAP SERPL CALCULATED.3IONS-SCNC: 15 MMOL/L (ref 3–16)
AST SERPL-CCNC: 17 U/L (ref 15–37)
BACTERIA: ABNORMAL /HPF
BASOPHILS ABSOLUTE: 0 K/UL (ref 0–0.2)
BASOPHILS RELATIVE PERCENT: 0.1 %
BILIRUB SERPL-MCNC: 0.4 MG/DL (ref 0–1)
BILIRUBIN DIRECT: <0.2 MG/DL (ref 0–0.3)
BILIRUBIN URINE: NEGATIVE
BILIRUBIN, INDIRECT: NORMAL MG/DL (ref 0–1)
BLOOD, URINE: ABNORMAL
BUN BLDV-MCNC: 9 MG/DL (ref 7–20)
CALCIUM SERPL-MCNC: 9.8 MG/DL (ref 8.3–10.6)
CHLORIDE BLD-SCNC: 108 MMOL/L (ref 99–110)
CHOLESTEROL, TOTAL: 125 MG/DL (ref 0–199)
CLARITY: CLEAR
CO2: 23 MMOL/L (ref 21–32)
COLOR: YELLOW
CREAT SERPL-MCNC: 0.8 MG/DL (ref 0.6–1.2)
CREATININE URINE: 37.2 MG/DL (ref 28–259)
EOSINOPHILS ABSOLUTE: 0.1 K/UL (ref 0–0.6)
EOSINOPHILS RELATIVE PERCENT: 1.6 %
EPITHELIAL CELLS, UA: 3 /HPF (ref 0–5)
GFR AFRICAN AMERICAN: >60
GFR NON-AFRICAN AMERICAN: >60
GLUCOSE BLD-MCNC: 147 MG/DL (ref 70–99)
GLUCOSE URINE: NEGATIVE MG/DL
HCT VFR BLD CALC: 40.3 % (ref 36–48)
HDLC SERPL-MCNC: 40 MG/DL (ref 40–60)
HEMOGLOBIN: 13.3 G/DL (ref 12–16)
HYALINE CASTS: 0 /LPF (ref 0–8)
KETONES, URINE: NEGATIVE MG/DL
LDL CHOLESTEROL CALCULATED: 67 MG/DL
LEUKOCYTE ESTERASE, URINE: ABNORMAL
LYMPHOCYTES ABSOLUTE: 0.8 K/UL (ref 1–5.1)
LYMPHOCYTES RELATIVE PERCENT: 20.1 %
MAGNESIUM: 1.7 MG/DL (ref 1.8–2.4)
MCH RBC QN AUTO: 28.4 PG (ref 26–34)
MCHC RBC AUTO-ENTMCNC: 33.1 G/DL (ref 31–36)
MCV RBC AUTO: 86 FL (ref 80–100)
MICROSCOPIC EXAMINATION: YES
MONOCYTES ABSOLUTE: 0.4 K/UL (ref 0–1.3)
MONOCYTES RELATIVE PERCENT: 10.2 %
NEUTROPHILS ABSOLUTE: 2.8 K/UL (ref 1.7–7.7)
NEUTROPHILS RELATIVE PERCENT: 68 %
NITRITE, URINE: NEGATIVE
PDW BLD-RTO: 13.4 % (ref 12.4–15.4)
PH UA: 6 (ref 5–8)
PHOSPHORUS: 2.7 MG/DL (ref 2.5–4.9)
PLATELET # BLD: 206 K/UL (ref 135–450)
PMV BLD AUTO: 8.6 FL (ref 5–10.5)
POTASSIUM SERPL-SCNC: 4.4 MMOL/L (ref 3.5–5.1)
PROTEIN PROTEIN: 33 MG/DL
PROTEIN UA: 30 MG/DL
RBC # BLD: 4.69 M/UL (ref 4–5.2)
RBC UA: 2 /HPF (ref 0–4)
SODIUM BLD-SCNC: 146 MMOL/L (ref 136–145)
SPECIFIC GRAVITY UA: 1.01 (ref 1–1.03)
TOTAL PROTEIN: 6.8 G/DL (ref 6.4–8.2)
TRIGL SERPL-MCNC: 88 MG/DL (ref 0–150)
URINE REFLEX TO CULTURE: YES
URINE TYPE: ABNORMAL
UROBILINOGEN, URINE: 0.2 E.U./DL
VLDLC SERPL CALC-MCNC: 18 MG/DL
WBC # BLD: 4.1 K/UL (ref 4–11)
WBC UA: 13 /HPF (ref 0–5)

## 2019-03-16 PROCEDURE — 81001 URINALYSIS AUTO W/SCOPE: CPT

## 2019-03-16 PROCEDURE — 36415 COLL VENOUS BLD VENIPUNCTURE: CPT

## 2019-03-16 PROCEDURE — 80076 HEPATIC FUNCTION PANEL: CPT

## 2019-03-16 PROCEDURE — 80048 BASIC METABOLIC PNL TOTAL CA: CPT

## 2019-03-16 PROCEDURE — 87186 SC STD MICRODIL/AGAR DIL: CPT

## 2019-03-16 PROCEDURE — 84100 ASSAY OF PHOSPHORUS: CPT

## 2019-03-16 PROCEDURE — 87077 CULTURE AEROBIC IDENTIFY: CPT

## 2019-03-16 PROCEDURE — 80061 LIPID PANEL: CPT

## 2019-03-16 PROCEDURE — 87086 URINE CULTURE/COLONY COUNT: CPT

## 2019-03-16 PROCEDURE — 84156 ASSAY OF PROTEIN URINE: CPT

## 2019-03-16 PROCEDURE — 85025 COMPLETE CBC W/AUTO DIFF WBC: CPT

## 2019-03-16 PROCEDURE — 83735 ASSAY OF MAGNESIUM: CPT

## 2019-03-16 PROCEDURE — 83036 HEMOGLOBIN GLYCOSYLATED A1C: CPT

## 2019-03-16 PROCEDURE — 82570 ASSAY OF URINE CREATININE: CPT

## 2019-03-17 LAB
ESTIMATED AVERAGE GLUCOSE: 125.5 MG/DL
HBA1C MFR BLD: 6 %

## 2019-03-18 LAB
ORGANISM: ABNORMAL
URINE CULTURE, ROUTINE: ABNORMAL
URINE CULTURE, ROUTINE: ABNORMAL

## 2019-05-11 ENCOUNTER — HOSPITAL ENCOUNTER (OUTPATIENT)
Age: 69
Discharge: HOME | End: 2019-05-11
Payer: MEDICARE

## 2019-05-11 LAB
ALBUMIN SERPL-MCNC: 4.2 G/DL (ref 3.4–5)
ALP BLD-CCNC: 90 U/L (ref 40–129)
ALT SERPL-CCNC: 14 U/L (ref 10–40)
ANION GAP SERPL CALCULATED.3IONS-SCNC: 11 MMOL/L (ref 3–16)
AST SERPL-CCNC: 18 U/L (ref 15–37)
BACTERIA: ABNORMAL /HPF
BASOPHILS ABSOLUTE: 0 K/UL (ref 0–0.2)
BASOPHILS RELATIVE PERCENT: 0.2 %
BILIRUB SERPL-MCNC: 0.8 MG/DL (ref 0–1)
BILIRUBIN DIRECT: <0.2 MG/DL (ref 0–0.3)
BILIRUBIN URINE: NEGATIVE
BILIRUBIN, INDIRECT: NORMAL MG/DL (ref 0–1)
BLOOD, URINE: ABNORMAL
BUN BLDV-MCNC: 17 MG/DL (ref 7–20)
CALCIUM SERPL-MCNC: 9.8 MG/DL (ref 8.3–10.6)
CHLORIDE BLD-SCNC: 104 MMOL/L (ref 99–110)
CHOLESTEROL, TOTAL: 125 MG/DL (ref 0–199)
CLARITY: ABNORMAL
CO2: 24 MMOL/L (ref 21–32)
COLOR: YELLOW
CREAT SERPL-MCNC: 0.8 MG/DL (ref 0.6–1.2)
CREATININE URINE: 57.3 MG/DL (ref 28–259)
EOSINOPHILS ABSOLUTE: 0.1 K/UL (ref 0–0.6)
EOSINOPHILS RELATIVE PERCENT: 1.2 %
EPITHELIAL CELLS, UA: 4 /HPF (ref 0–5)
GFR AFRICAN AMERICAN: >60
GFR NON-AFRICAN AMERICAN: >60
GLUCOSE BLD-MCNC: 126 MG/DL (ref 70–99)
GLUCOSE URINE: NEGATIVE MG/DL
HCT VFR BLD CALC: 41.6 % (ref 36–48)
HDLC SERPL-MCNC: 39 MG/DL (ref 40–60)
HEMOGLOBIN: 13.9 G/DL (ref 12–16)
HYALINE CASTS: 1 /LPF (ref 0–8)
KETONES, URINE: NEGATIVE MG/DL
LDL CHOLESTEROL CALCULATED: 62 MG/DL
LEUKOCYTE ESTERASE, URINE: ABNORMAL
LYMPHOCYTES ABSOLUTE: 1.1 K/UL (ref 1–5.1)
LYMPHOCYTES RELATIVE PERCENT: 21.1 %
MAGNESIUM: 1.6 MG/DL (ref 1.8–2.4)
MCH RBC QN AUTO: 28.6 PG (ref 26–34)
MCHC RBC AUTO-ENTMCNC: 33.4 G/DL (ref 31–36)
MCV RBC AUTO: 85.6 FL (ref 80–100)
MICROSCOPIC EXAMINATION: YES
MONOCYTES ABSOLUTE: 0.6 K/UL (ref 0–1.3)
MONOCYTES RELATIVE PERCENT: 11.1 %
NEUTROPHILS ABSOLUTE: 3.4 K/UL (ref 1.7–7.7)
NEUTROPHILS RELATIVE PERCENT: 66.4 %
NITRITE, URINE: NEGATIVE
PDW BLD-RTO: 14 % (ref 12.4–15.4)
PH UA: 6 (ref 5–8)
PHOSPHORUS: 3.1 MG/DL (ref 2.5–4.9)
PLATELET # BLD: 211 K/UL (ref 135–450)
PMV BLD AUTO: 8.7 FL (ref 5–10.5)
POTASSIUM SERPL-SCNC: 3.9 MMOL/L (ref 3.5–5.1)
PROTEIN PROTEIN: 95 MG/DL
PROTEIN UA: 100 MG/DL
RBC # BLD: 4.86 M/UL (ref 4–5.2)
RBC UA: 1 /HPF (ref 0–4)
SODIUM BLD-SCNC: 139 MMOL/L (ref 136–145)
SPECIFIC GRAVITY UA: 1.01 (ref 1–1.03)
TOTAL PROTEIN: 7.1 G/DL (ref 6.4–8.2)
TRIGL SERPL-MCNC: 121 MG/DL (ref 0–150)
URINE REFLEX TO CULTURE: YES
URINE TYPE: ABNORMAL
UROBILINOGEN, URINE: 0.2 E.U./DL
VLDLC SERPL CALC-MCNC: 24 MG/DL
WBC # BLD: 5.1 K/UL (ref 4–11)
WBC UA: 14 /HPF (ref 0–5)

## 2019-05-11 PROCEDURE — 87186 SC STD MICRODIL/AGAR DIL: CPT

## 2019-05-11 PROCEDURE — 80076 HEPATIC FUNCTION PANEL: CPT

## 2019-05-11 PROCEDURE — 84100 ASSAY OF PHOSPHORUS: CPT

## 2019-05-11 PROCEDURE — 36415 COLL VENOUS BLD VENIPUNCTURE: CPT

## 2019-05-11 PROCEDURE — 80048 BASIC METABOLIC PNL TOTAL CA: CPT

## 2019-05-11 PROCEDURE — 83735 ASSAY OF MAGNESIUM: CPT

## 2019-05-11 PROCEDURE — 87077 CULTURE AEROBIC IDENTIFY: CPT

## 2019-05-11 PROCEDURE — 80061 LIPID PANEL: CPT

## 2019-05-11 PROCEDURE — 80197 ASSAY OF TACROLIMUS: CPT

## 2019-05-11 PROCEDURE — 85025 COMPLETE CBC W/AUTO DIFF WBC: CPT

## 2019-05-11 PROCEDURE — 81001 URINALYSIS AUTO W/SCOPE: CPT

## 2019-05-11 PROCEDURE — 82570 ASSAY OF URINE CREATININE: CPT

## 2019-05-11 PROCEDURE — 83036 HEMOGLOBIN GLYCOSYLATED A1C: CPT

## 2019-05-11 PROCEDURE — 84156 ASSAY OF PROTEIN URINE: CPT

## 2019-05-11 PROCEDURE — 87086 URINE CULTURE/COLONY COUNT: CPT

## 2019-05-12 LAB
ESTIMATED AVERAGE GLUCOSE: 134.1 MG/DL
HBA1C MFR BLD: 6.3 %
TACROLIMUS BLOOD: 10 NG/ML (ref 5–20)

## 2019-05-13 LAB
ORGANISM: ABNORMAL
URINE CULTURE, ROUTINE: ABNORMAL
URINE CULTURE, ROUTINE: ABNORMAL

## 2019-05-25 ENCOUNTER — HOSPITAL ENCOUNTER (OUTPATIENT)
Age: 69
Discharge: HOME | End: 2019-05-25
Payer: MEDICARE

## 2019-05-25 LAB
ALBUMIN SERPL-MCNC: 4 G/DL (ref 3.4–5)
ANION GAP SERPL CALCULATED.3IONS-SCNC: 14 MMOL/L (ref 3–16)
BUN BLDV-MCNC: 17 MG/DL (ref 7–20)
CALCIUM SERPL-MCNC: 9.4 MG/DL (ref 8.3–10.6)
CHLORIDE BLD-SCNC: 104 MMOL/L (ref 99–110)
CO2: 23 MMOL/L (ref 21–32)
CREAT SERPL-MCNC: 1.1 MG/DL (ref 0.6–1.2)
GFR AFRICAN AMERICAN: 60
GFR NON-AFRICAN AMERICAN: 49
GLUCOSE BLD-MCNC: 135 MG/DL (ref 70–99)
PHOSPHORUS: 2.7 MG/DL (ref 2.5–4.9)
POTASSIUM SERPL-SCNC: 3.7 MMOL/L (ref 3.5–5.1)
SODIUM BLD-SCNC: 141 MMOL/L (ref 136–145)

## 2019-05-25 PROCEDURE — 80069 RENAL FUNCTION PANEL: CPT

## 2019-05-25 PROCEDURE — 36415 COLL VENOUS BLD VENIPUNCTURE: CPT

## 2019-05-25 PROCEDURE — 80197 ASSAY OF TACROLIMUS: CPT

## 2019-05-26 LAB — TACROLIMUS BLOOD: 9.6 NG/ML (ref 5–20)

## 2019-09-20 ENCOUNTER — HOSPITAL ENCOUNTER (OUTPATIENT)
Age: 69
Discharge: HOME | End: 2019-09-20
Payer: MEDICARE

## 2019-09-20 LAB
ALBUMIN SERPL-MCNC: 4.3 G/DL (ref 3.4–5)
ANION GAP SERPL CALCULATED.3IONS-SCNC: 15 MMOL/L (ref 3–16)
BACTERIA: ABNORMAL /HPF
BASOPHILS ABSOLUTE: 0 K/UL (ref 0–0.2)
BASOPHILS RELATIVE PERCENT: 0.1 %
BILIRUBIN URINE: NEGATIVE
BLOOD, URINE: ABNORMAL
BUN BLDV-MCNC: 29 MG/DL (ref 7–20)
CALCIUM SERPL-MCNC: 9.4 MG/DL (ref 8.3–10.6)
CHLORIDE BLD-SCNC: 105 MMOL/L (ref 99–110)
CLARITY: CLEAR
CO2: 23 MMOL/L (ref 21–32)
COLOR: YELLOW
CREAT SERPL-MCNC: 1 MG/DL (ref 0.6–1.2)
CREATININE URINE: 116.6 MG/DL (ref 28–259)
EOSINOPHILS ABSOLUTE: 0.1 K/UL (ref 0–0.6)
EOSINOPHILS RELATIVE PERCENT: 1.2 %
EPITHELIAL CELLS, UA: 5 /HPF (ref 0–5)
GFR AFRICAN AMERICAN: >60
GFR NON-AFRICAN AMERICAN: 55
GLUCOSE BLD-MCNC: 132 MG/DL (ref 70–99)
GLUCOSE URINE: NEGATIVE MG/DL
HCT VFR BLD CALC: 40.7 % (ref 36–48)
HEMOGLOBIN: 13.7 G/DL (ref 12–16)
HYALINE CASTS: 1 /LPF (ref 0–8)
KETONES, URINE: NEGATIVE MG/DL
LEUKOCYTE ESTERASE, URINE: ABNORMAL
LYMPHOCYTES ABSOLUTE: 0.9 K/UL (ref 1–5.1)
LYMPHOCYTES RELATIVE PERCENT: 18.8 %
MAGNESIUM: 1.7 MG/DL (ref 1.8–2.4)
MCH RBC QN AUTO: 28.9 PG (ref 26–34)
MCHC RBC AUTO-ENTMCNC: 33.6 G/DL (ref 31–36)
MCV RBC AUTO: 85.8 FL (ref 80–100)
MICROSCOPIC EXAMINATION: YES
MONOCYTES ABSOLUTE: 0.5 K/UL (ref 0–1.3)
MONOCYTES RELATIVE PERCENT: 11.1 %
NEUTROPHILS ABSOLUTE: 3.4 K/UL (ref 1.7–7.7)
NEUTROPHILS RELATIVE PERCENT: 68.8 %
NITRITE, URINE: NEGATIVE
PDW BLD-RTO: 13.7 % (ref 12.4–15.4)
PH UA: 6 (ref 5–8)
PHOSPHORUS: 3.1 MG/DL (ref 2.5–4.9)
PLATELET # BLD: 171 K/UL (ref 135–450)
PMV BLD AUTO: 9 FL (ref 5–10.5)
POTASSIUM SERPL-SCNC: 4.3 MMOL/L (ref 3.5–5.1)
PROTEIN PROTEIN: 246 MG/DL
PROTEIN UA: >=300 MG/DL
RBC # BLD: 4.75 M/UL (ref 4–5.2)
RBC UA: 3 /HPF (ref 0–4)
SODIUM BLD-SCNC: 143 MMOL/L (ref 136–145)
SPECIFIC GRAVITY UA: 1.02 (ref 1–1.03)
URINE REFLEX TO CULTURE: YES
URINE TYPE: ABNORMAL
UROBILINOGEN, URINE: 0.2 E.U./DL
WBC # BLD: 4.9 K/UL (ref 4–11)
WBC UA: 14 /HPF (ref 0–5)

## 2019-09-20 PROCEDURE — 87186 SC STD MICRODIL/AGAR DIL: CPT

## 2019-09-20 PROCEDURE — 36415 COLL VENOUS BLD VENIPUNCTURE: CPT

## 2019-09-20 PROCEDURE — 80069 RENAL FUNCTION PANEL: CPT

## 2019-09-20 PROCEDURE — 84156 ASSAY OF PROTEIN URINE: CPT

## 2019-09-20 PROCEDURE — 85025 COMPLETE CBC W/AUTO DIFF WBC: CPT

## 2019-09-20 PROCEDURE — 80197 ASSAY OF TACROLIMUS: CPT

## 2019-09-20 PROCEDURE — 83735 ASSAY OF MAGNESIUM: CPT

## 2019-09-20 PROCEDURE — 87086 URINE CULTURE/COLONY COUNT: CPT

## 2019-09-20 PROCEDURE — 81001 URINALYSIS AUTO W/SCOPE: CPT

## 2019-09-20 PROCEDURE — 87077 CULTURE AEROBIC IDENTIFY: CPT

## 2019-09-20 PROCEDURE — 82570 ASSAY OF URINE CREATININE: CPT

## 2019-09-21 LAB — TACROLIMUS BLOOD: 9.4 NG/ML (ref 5–20)

## 2019-09-23 LAB
ORGANISM: ABNORMAL
URINE CULTURE, ROUTINE: ABNORMAL

## 2020-01-18 ENCOUNTER — HOSPITAL ENCOUNTER (OUTPATIENT)
Age: 70
Discharge: HOME | End: 2020-01-18
Payer: MEDICARE

## 2020-01-18 LAB
ALBUMIN SERPL-MCNC: 4 G/DL (ref 3.4–5)
ALP BLD-CCNC: 73 U/L (ref 40–129)
ALT SERPL-CCNC: 13 U/L (ref 10–40)
ANION GAP SERPL CALCULATED.3IONS-SCNC: 15 MMOL/L (ref 3–16)
AST SERPL-CCNC: 19 U/L (ref 15–37)
BASOPHILS ABSOLUTE: 0 K/UL (ref 0–0.2)
BASOPHILS RELATIVE PERCENT: 0.1 %
BILIRUB SERPL-MCNC: 0.6 MG/DL (ref 0–1)
BILIRUBIN DIRECT: <0.2 MG/DL (ref 0–0.3)
BILIRUBIN URINE: NEGATIVE
BILIRUBIN, INDIRECT: NORMAL MG/DL (ref 0–1)
BLOOD, URINE: ABNORMAL
BUN BLDV-MCNC: 22 MG/DL (ref 7–20)
CALCIUM SERPL-MCNC: 9.7 MG/DL (ref 8.3–10.6)
CHLORIDE BLD-SCNC: 106 MMOL/L (ref 99–110)
CLARITY: CLEAR
CO2: 20 MMOL/L (ref 21–32)
COLOR: YELLOW
CREAT SERPL-MCNC: 0.9 MG/DL (ref 0.6–1.2)
CREATININE URINE: 62.6 MG/DL (ref 28–259)
EOSINOPHILS ABSOLUTE: 0.1 K/UL (ref 0–0.6)
EOSINOPHILS RELATIVE PERCENT: 1 %
EPITHELIAL CELLS, UA: 2 /HPF (ref 0–5)
GFR AFRICAN AMERICAN: >60
GFR NON-AFRICAN AMERICAN: >60
GLUCOSE BLD-MCNC: 115 MG/DL (ref 70–99)
GLUCOSE URINE: NEGATIVE MG/DL
HCT VFR BLD CALC: 41.3 % (ref 36–48)
HEMOGLOBIN: 13.8 G/DL (ref 12–16)
HYALINE CASTS: 2 /LPF (ref 0–8)
KETONES, URINE: NEGATIVE MG/DL
LEUKOCYTE ESTERASE, URINE: ABNORMAL
LYMPHOCYTES ABSOLUTE: 1 K/UL (ref 1–5.1)
LYMPHOCYTES RELATIVE PERCENT: 17.8 %
MAGNESIUM: 1.8 MG/DL (ref 1.8–2.4)
MCH RBC QN AUTO: 28.9 PG (ref 26–34)
MCHC RBC AUTO-ENTMCNC: 33.3 G/DL (ref 31–36)
MCV RBC AUTO: 86.6 FL (ref 80–100)
MICROSCOPIC EXAMINATION: YES
MONOCYTES ABSOLUTE: 0.6 K/UL (ref 0–1.3)
MONOCYTES RELATIVE PERCENT: 10.3 %
NEUTROPHILS ABSOLUTE: 4 K/UL (ref 1.7–7.7)
NEUTROPHILS RELATIVE PERCENT: 70.8 %
NITRITE, URINE: NEGATIVE
PDW BLD-RTO: 13.2 % (ref 12.4–15.4)
PH UA: 6.5 (ref 5–8)
PHOSPHORUS: 3.1 MG/DL (ref 2.5–4.9)
PLATELET # BLD: 163 K/UL (ref 135–450)
PMV BLD AUTO: 9.1 FL (ref 5–10.5)
POTASSIUM SERPL-SCNC: 4.3 MMOL/L (ref 3.5–5.1)
PROTEIN PROTEIN: 156 MG/DL
PROTEIN UA: >=300 MG/DL
RBC # BLD: 4.77 M/UL (ref 4–5.2)
RBC UA: 2 /HPF (ref 0–4)
SODIUM BLD-SCNC: 141 MMOL/L (ref 136–145)
SPECIFIC GRAVITY UA: 1.01 (ref 1–1.03)
TACROLIMUS BLOOD: 10.3 NG/ML (ref 5–20)
TOTAL PROTEIN: 6.7 G/DL (ref 6.4–8.2)
URINE REFLEX TO CULTURE: YES
URINE TYPE: ABNORMAL
UROBILINOGEN, URINE: 0.2 E.U./DL
WBC # BLD: 5.7 K/UL (ref 4–11)
WBC UA: 28 /HPF (ref 0–5)

## 2020-01-18 PROCEDURE — 85025 COMPLETE CBC W/AUTO DIFF WBC: CPT

## 2020-01-18 PROCEDURE — 87086 URINE CULTURE/COLONY COUNT: CPT

## 2020-01-18 PROCEDURE — 80076 HEPATIC FUNCTION PANEL: CPT

## 2020-01-18 PROCEDURE — 36415 COLL VENOUS BLD VENIPUNCTURE: CPT

## 2020-01-18 PROCEDURE — 81001 URINALYSIS AUTO W/SCOPE: CPT

## 2020-01-18 PROCEDURE — 82570 ASSAY OF URINE CREATININE: CPT

## 2020-01-18 PROCEDURE — 84156 ASSAY OF PROTEIN URINE: CPT

## 2020-01-18 PROCEDURE — 83735 ASSAY OF MAGNESIUM: CPT

## 2020-01-18 PROCEDURE — 84100 ASSAY OF PHOSPHORUS: CPT

## 2020-01-18 PROCEDURE — 80197 ASSAY OF TACROLIMUS: CPT

## 2020-01-18 PROCEDURE — 80048 BASIC METABOLIC PNL TOTAL CA: CPT

## 2020-01-19 LAB — URINE CULTURE, ROUTINE: NORMAL

## 2020-01-28 ENCOUNTER — HOSPITAL ENCOUNTER (OUTPATIENT)
Age: 70
Discharge: HOME | End: 2020-01-28
Payer: MEDICARE

## 2020-01-28 PROCEDURE — 83883 ASSAY NEPHELOMETRY NOT SPEC: CPT

## 2020-01-30 LAB — MISCELLANEOUS LAB TEST ORDER: NORMAL

## 2020-03-04 ENCOUNTER — OFFICE VISIT (OUTPATIENT)
Dept: ORTHOPEDIC SURGERY | Age: 70
End: 2020-03-04
Payer: MEDICARE

## 2020-03-04 VITALS
DIASTOLIC BLOOD PRESSURE: 89 MMHG | HEIGHT: 64 IN | RESPIRATION RATE: 16 BRPM | BODY MASS INDEX: 32.95 KG/M2 | HEART RATE: 80 BPM | WEIGHT: 193 LBS | SYSTOLIC BLOOD PRESSURE: 131 MMHG

## 2020-03-04 PROCEDURE — 20610 DRAIN/INJ JOINT/BURSA W/O US: CPT | Performed by: ORTHOPAEDIC SURGERY

## 2020-03-04 PROCEDURE — 99203 OFFICE O/P NEW LOW 30 MIN: CPT | Performed by: ORTHOPAEDIC SURGERY

## 2020-03-04 RX ORDER — TRIAMCINOLONE ACETONIDE 40 MG/ML
40 INJECTION, SUSPENSION INTRA-ARTICULAR; INTRAMUSCULAR ONCE
Status: COMPLETED | OUTPATIENT
Start: 2020-03-04 | End: 2020-03-04

## 2020-03-04 RX ADMIN — TRIAMCINOLONE ACETONIDE 40 MG: 40 INJECTION, SUSPENSION INTRA-ARTICULAR; INTRAMUSCULAR at 13:32

## 2020-03-04 NOTE — PROGRESS NOTES
strain: None    Food insecurity:     Worry: None     Inability: None    Transportation needs:     Medical: None     Non-medical: None   Tobacco Use    Smoking status: Never Smoker   Substance and Sexual Activity    Alcohol use: No    Drug use: No    Sexual activity: Not Currently   Lifestyle    Physical activity:     Days per week: None     Minutes per session: None    Stress: None   Relationships    Social connections:     Talks on phone: None     Gets together: None     Attends [employer] service: None     Active member of club or organization: None     Attends meetings of clubs or organizations: None     Relationship status: None    Intimate partner violence:     Fear of current or ex partner: None     Emotionally abused: None     Physically abused: None     Forced sexual activity: None   Other Topics Concern    None   Social History Narrative    None       Current Outpatient Medications   Medication Sig Dispense Refill    metoprolol (TOPROL-XL) 100 MG XL tablet Take 100 mg by mouth daily.  allopurinol (ZYLOPRIM) 100 MG tablet Take 100 mg by mouth daily.  verapamil (CALAN-SR) 120 MG CR tablet Take 120 mg by mouth 2 times daily.  levothyroxine (SYNTHROID) 125 MCG tablet Take 125 mcg by mouth daily.  atorvastatin (LIPITOR) 80 MG tablet Take 80 mg by mouth daily.  omeprazole (PRILOSEC) 20 MG capsule Take 20 mg by mouth daily.  potassium chloride SA (KLOR-CON M20) 20 MEQ tablet Take 20 mEq by mouth 2 times daily.  terazosin (HYTRIN) 1 MG capsule Take 2 mg by mouth 2 times daily.  valsartan (DIOVAN) 320 MG tablet Take 320 mg by mouth daily.  torsemide (DEMADEX) 100 MG tablet Take 100 mg by mouth daily.  Aliskiren-Hydrochlorothiazide (TEKTURNA HCT) 300-12.5 MG TABS Take 300 mg by mouth daily. No current facility-administered medications for this visit.         Allergies   Allergen Reactions    Cefuroxime Axetil Swelling     Lips to swell       Review of Systems:  Pertinent items are noted in HPI. 13 point review of systems from Patient History Form dated 3/4/20 and available in the patient's chart under the Media tab. Physical Examination:     Vital signs:   /89   Pulse 80   Resp 16   Ht 5' 4.02\" (1.626 m)   Wt 193 lb (87.5 kg)   BMI 33.11 kg/m²     General:  alert, appears stated age, cooperative and no distress   Gait:  Normal. The patient can bear weight on the injured extremity. Right Knee  Alignment:  neutral   ROM:  5 degrees extension to 120 degrees flexion   Left knee: 0 degrees extension, 120 flexion   Crepitus:  no   Joint Tenderness:  lateral joint line and medial joint line   Effusion:   0 cc   Patellar excursion:  2 of 4 quadrants    Patellar tilt test:  positive   Patellar facet tenderness:  positive medial   positive lateral   Trochlear tenderness:  negative   Anterior drawer test:  negative   Posterior drawer:   negative   Varus laxity at 30 degrees:  negative   Left knee: negative   Valgus laxity at 30 degrees:   negative   Left knee: negative     There is not any cellulitis, lymphedema or cutaneous lesions noted in the lower extremities. Motor exam of the lower extremities show quadriceps, hamstrings, foot dorsiflexion and plantarflexion grossly intact. Sensation to both feet is grossly intact to light touch. The bilateral lower extremities are warm and well-perfused with brisk capillary refill. Imaging:  Right Knee X-Ray: 3 view x-rays of the knee, including bilateral AP and sunrise and lateral of the affected side were obtained and reviewed. No fracture. Moderate to severe patellofemoral narrowing. Mild to moderate tibiofemoral narrowing. Left knee with severe tibiofemoral narrowing. Assessment:     Right knee osteoarthritis  H/o renal transplant      Plan:     Natural history and expected course discussed. Questions answered. Ice prn. Voltaren gel prn.     The risks and benefits of an injection

## 2020-06-12 ENCOUNTER — OFFICE VISIT (OUTPATIENT)
Dept: SURGERY | Age: 70
End: 2020-06-12
Payer: MEDICARE

## 2020-06-12 VITALS
TEMPERATURE: 97.9 F | BODY MASS INDEX: 34.14 KG/M2 | DIASTOLIC BLOOD PRESSURE: 88 MMHG | SYSTOLIC BLOOD PRESSURE: 130 MMHG | WEIGHT: 199 LBS

## 2020-06-12 PROCEDURE — 99213 OFFICE O/P EST LOW 20 MIN: CPT | Performed by: SURGERY

## 2020-06-12 ASSESSMENT — ENCOUNTER SYMPTOMS
RESPIRATORY NEGATIVE: 1
EYES NEGATIVE: 1
ALLERGIC/IMMUNOLOGIC NEGATIVE: 1
GASTROINTESTINAL NEGATIVE: 1

## 2020-06-12 NOTE — PROGRESS NOTES
Preeti Casiano is a 77 y.o. female     CC: Abscess of the axilla    HPI: 81-year-old female who presents for evaluation of an abscess of the axilla. She was treated with Augmentin per Dr. Thane Fothergill.  The abscess spontaneously drained and is doing better at this point in time. She reports that she has had an issue in the same area intermittently for 3 months. She has also had other areas which have become inflamed and resolved. Past Medical History:   Diagnosis Date    Esophageal reflux     Hyperlipidemia     Hypertension     Nephropathy, membranous     Nephrotic syndrome     Thyroid disease     hypotyhroidism       Cefuroxime axetil     Past Surgical History:   Procedure Laterality Date    ABDOMEN SURGERY      ELBOW ARTHROSCOPY      bilaterally    KNEE ARTHROSCOPY      bilaterally    TONSILLECTOMY          Prior to Visit Medications    Medication Sig Taking? Authorizing Provider   metoprolol (TOPROL-XL) 100 MG XL tablet Take 100 mg by mouth daily. Yes Historical Provider, MD   allopurinol (ZYLOPRIM) 100 MG tablet Take 100 mg by mouth daily. Yes Historical Provider, MD   verapamil (CALAN-SR) 120 MG CR tablet Take 120 mg by mouth 2 times daily. Yes Historical Provider, MD   levothyroxine (SYNTHROID) 125 MCG tablet Take 125 mcg by mouth daily. Yes Historical Provider, MD   atorvastatin (LIPITOR) 80 MG tablet Take 80 mg by mouth daily. Yes Historical Provider, MD   omeprazole (PRILOSEC) 20 MG capsule Take 20 mg by mouth daily. Yes Historical Provider, MD   potassium chloride SA (KLOR-CON M20) 20 MEQ tablet Take 20 mEq by mouth 2 times daily. Yes Historical Provider, MD   terazosin (HYTRIN) 1 MG capsule Take 2 mg by mouth 2 times daily. Yes Historical Provider, MD   valsartan (DIOVAN) 320 MG tablet Take 320 mg by mouth daily. Yes Historical Provider, MD   torsemide (DEMADEX) 100 MG tablet Take 100 mg by mouth daily.  Yes Historical Provider, MD   Aliskiren-Hydrochlorothiazide (TEKTURNA HCT) 300-12.5 MG

## 2020-06-25 ENCOUNTER — OFFICE VISIT (OUTPATIENT)
Dept: SURGERY | Age: 70
End: 2020-06-25
Payer: MEDICARE

## 2020-06-25 VITALS
WEIGHT: 199 LBS | SYSTOLIC BLOOD PRESSURE: 130 MMHG | DIASTOLIC BLOOD PRESSURE: 88 MMHG | BODY MASS INDEX: 34.14 KG/M2 | TEMPERATURE: 98.1 F

## 2020-06-25 PROCEDURE — 10061 I&D ABSCESS COMP/MULTIPLE: CPT | Performed by: SURGERY

## 2020-06-25 RX ORDER — SULFAMETHOXAZOLE AND TRIMETHOPRIM 800; 160 MG/1; MG/1
1 TABLET ORAL 2 TIMES DAILY
Qty: 14 TABLET | Refills: 0 | Status: SHIPPED | OUTPATIENT
Start: 2020-06-25 | End: 2020-07-02

## 2020-06-25 NOTE — PROGRESS NOTES
Office Procedure    Pre op Dx-hidradenitis with abscess x2 of the right axilla    Post op Dx-same    Procedure-incision and drainage x2 of abscesses of the right axilla    Surgeon-Dr. Lacie Gaytan    Anesthesia-local    EBL-min    Operative indications and consent-77year-old female with hidradenitis of the right axilla with abscess x2. The plan is for incision and drainage. Patient explained risks,benefits, complications and alternatives. Procedure-Patient prepped and draped in the usual sterile fashion. Supine position. The skin and subcutaneous tissues were infiltrated with 1% lidocaine. I&D is performed on 2 areas of the right axilla towards the chest wall. Some purulent fluid was expressed. There was not enough to take a reliable culture. Stasis was achieved with pressure. A dry dressing was applied. She tolerated the procedure well. Disposition- keep dry bandage over I&D site. Wash with antibacterial soap. Avoid shaving the area and deodorant. Bactrim DS 1 p.o. twice daily for 7 days.   Follow-up in 10 days

## 2020-06-26 ENCOUNTER — HOSPITAL ENCOUNTER (OUTPATIENT)
Age: 70
Discharge: HOME | End: 2020-06-26
Payer: MEDICARE

## 2020-06-26 LAB
ALBUMIN SERPL-MCNC: 4.1 G/DL (ref 3.4–5)
ANION GAP SERPL CALCULATED.3IONS-SCNC: 14 MMOL/L (ref 3–16)
BASOPHILS ABSOLUTE: 0 K/UL (ref 0–0.2)
BASOPHILS RELATIVE PERCENT: 0.3 %
BILIRUBIN URINE: NEGATIVE
BLOOD, URINE: ABNORMAL
BUN BLDV-MCNC: 28 MG/DL (ref 7–20)
CALCIUM SERPL-MCNC: 9.2 MG/DL (ref 8.3–10.6)
CHLORIDE BLD-SCNC: 106 MMOL/L (ref 99–110)
CLARITY: ABNORMAL
CO2: 22 MMOL/L (ref 21–32)
COLOR: YELLOW
CREAT SERPL-MCNC: 1.3 MG/DL (ref 0.6–1.2)
CREATININE URINE: 108.7 MG/DL (ref 28–259)
EOSINOPHILS ABSOLUTE: 0 K/UL (ref 0–0.6)
EOSINOPHILS RELATIVE PERCENT: 0.5 %
EPITHELIAL CELLS, UA: 4 /HPF (ref 0–5)
GFR AFRICAN AMERICAN: 49
GFR NON-AFRICAN AMERICAN: 41
GLUCOSE BLD-MCNC: 111 MG/DL (ref 70–99)
GLUCOSE URINE: NEGATIVE MG/DL
HCT VFR BLD CALC: 39 % (ref 36–48)
HEMOGLOBIN: 12.8 G/DL (ref 12–16)
HYALINE CASTS: 2 /LPF (ref 0–8)
KETONES, URINE: NEGATIVE MG/DL
LEUKOCYTE ESTERASE, URINE: ABNORMAL
LYMPHOCYTES ABSOLUTE: 1.3 K/UL (ref 1–5.1)
LYMPHOCYTES RELATIVE PERCENT: 23.2 %
MAGNESIUM: 1.9 MG/DL (ref 1.8–2.4)
MCH RBC QN AUTO: 29.1 PG (ref 26–34)
MCHC RBC AUTO-ENTMCNC: 32.8 G/DL (ref 31–36)
MCV RBC AUTO: 88.7 FL (ref 80–100)
MICROSCOPIC EXAMINATION: YES
MONOCYTES ABSOLUTE: 0.6 K/UL (ref 0–1.3)
MONOCYTES RELATIVE PERCENT: 11 %
NEUTROPHILS ABSOLUTE: 3.7 K/UL (ref 1.7–7.7)
NEUTROPHILS RELATIVE PERCENT: 65 %
NITRITE, URINE: NEGATIVE
PDW BLD-RTO: 13.9 % (ref 12.4–15.4)
PH UA: 6 (ref 5–8)
PHOSPHORUS: 3.4 MG/DL (ref 2.5–4.9)
PLATELET # BLD: 169 K/UL (ref 135–450)
PMV BLD AUTO: 8.8 FL (ref 5–10.5)
POTASSIUM SERPL-SCNC: 4.7 MMOL/L (ref 3.5–5.1)
PROTEIN PROTEIN: 72 MG/DL
PROTEIN UA: 100 MG/DL
RBC # BLD: 4.39 M/UL (ref 4–5.2)
RBC UA: 3 /HPF (ref 0–4)
SODIUM BLD-SCNC: 142 MMOL/L (ref 136–145)
SPECIFIC GRAVITY UA: 1.02 (ref 1–1.03)
URINE REFLEX TO CULTURE: YES
URINE TYPE: ABNORMAL
UROBILINOGEN, URINE: 0.2 E.U./DL
WBC # BLD: 5.6 K/UL (ref 4–11)
WBC UA: 85 /HPF (ref 0–5)

## 2020-06-26 PROCEDURE — 80197 ASSAY OF TACROLIMUS: CPT

## 2020-06-26 PROCEDURE — 87086 URINE CULTURE/COLONY COUNT: CPT

## 2020-06-26 PROCEDURE — 83735 ASSAY OF MAGNESIUM: CPT

## 2020-06-26 PROCEDURE — 81001 URINALYSIS AUTO W/SCOPE: CPT

## 2020-06-26 PROCEDURE — 80069 RENAL FUNCTION PANEL: CPT

## 2020-06-26 PROCEDURE — 82570 ASSAY OF URINE CREATININE: CPT

## 2020-06-26 PROCEDURE — 84156 ASSAY OF PROTEIN URINE: CPT

## 2020-06-26 PROCEDURE — 85025 COMPLETE CBC W/AUTO DIFF WBC: CPT

## 2020-06-27 LAB
TACROLIMUS BLOOD: 7 NG/ML (ref 5–20)
URINE CULTURE, ROUTINE: NORMAL

## 2020-07-08 ENCOUNTER — OFFICE VISIT (OUTPATIENT)
Dept: SURGERY | Age: 70
End: 2020-07-08
Payer: MEDICARE

## 2020-07-08 VITALS
DIASTOLIC BLOOD PRESSURE: 80 MMHG | SYSTOLIC BLOOD PRESSURE: 130 MMHG | TEMPERATURE: 97 F | BODY MASS INDEX: 34.14 KG/M2 | WEIGHT: 199 LBS

## 2020-07-08 PROCEDURE — 99212 OFFICE O/P EST SF 10 MIN: CPT | Performed by: SURGERY

## 2020-07-08 NOTE — PROGRESS NOTES
Subjective:      Complaint-hidradenitis right axilla    Patient ID: HPI- Sergio Montenegro is a 77 y.o. female is seen in follow-up for hidradenitis of the right axilla. [facility]    Review of Systems    Objective:   Physical Exam  Constitutional:       Appearance: She is well-developed. HENT:      Head: Normocephalic and atraumatic. Right Ear: External ear normal.      Left Ear: External ear normal.   Eyes:      Conjunctiva/sclera: Conjunctivae normal.   Neck:      Musculoskeletal: Normal range of motion and neck supple. Musculoskeletal: Normal range of motion. Skin:     General: Skin is warm and dry. Neurological:      Mental Status: She is alert and oriented to person, place, and time. Psychiatric:         Behavior: Behavior normal.     There are several areas of resolving hidradenitis in the right axilla    Assessment:      79-year-old female seen in follow-up for right axillary hidradenitis. There 3-4 areas of resolving hidradenitis in the right axilla. She is feeling much better. Plan:      Continue antibacterial soap. Avoid shaving the area. Do not use deodorant until the areas are completely resolved. Follow-up as needed.         Lydia Cervantes MD

## 2020-07-22 ENCOUNTER — HOSPITAL ENCOUNTER (OUTPATIENT)
Age: 70
Discharge: HOME | End: 2020-07-22
Payer: MEDICARE

## 2020-07-22 LAB
ALBUMIN SERPL-MCNC: 4.2 G/DL (ref 3.4–5)
ANION GAP SERPL CALCULATED.3IONS-SCNC: 12 MMOL/L (ref 3–16)
BUN BLDV-MCNC: 24 MG/DL (ref 7–20)
CALCIUM SERPL-MCNC: 9.4 MG/DL (ref 8.3–10.6)
CHLORIDE BLD-SCNC: 104 MMOL/L (ref 99–110)
CO2: 25 MMOL/L (ref 21–32)
CREAT SERPL-MCNC: 1.1 MG/DL (ref 0.6–1.2)
GFR AFRICAN AMERICAN: 60
GFR NON-AFRICAN AMERICAN: 49
GLUCOSE BLD-MCNC: 117 MG/DL (ref 70–99)
PHOSPHORUS: 3.2 MG/DL (ref 2.5–4.9)
POTASSIUM SERPL-SCNC: 4.4 MMOL/L (ref 3.5–5.1)
SODIUM BLD-SCNC: 141 MMOL/L (ref 136–145)

## 2020-07-22 PROCEDURE — 80069 RENAL FUNCTION PANEL: CPT

## 2020-07-22 PROCEDURE — 36415 COLL VENOUS BLD VENIPUNCTURE: CPT

## 2020-11-06 ENCOUNTER — HOSPITAL ENCOUNTER (OUTPATIENT)
Age: 70
Discharge: HOME | End: 2020-11-06
Payer: MEDICARE

## 2020-11-06 LAB
ALBUMIN SERPL-MCNC: 3.9 G/DL (ref 3.4–5)
ANION GAP SERPL CALCULATED.3IONS-SCNC: 9 MMOL/L (ref 3–16)
BASOPHILS ABSOLUTE: 0 K/UL (ref 0–0.2)
BASOPHILS RELATIVE PERCENT: 0.2 %
BILIRUBIN URINE: NEGATIVE
BLOOD, URINE: ABNORMAL
BUN BLDV-MCNC: 30 MG/DL (ref 7–20)
CALCIUM SERPL-MCNC: 9.4 MG/DL (ref 8.3–10.6)
CHLORIDE BLD-SCNC: 104 MMOL/L (ref 99–110)
CLARITY: ABNORMAL
CO2: 27 MMOL/L (ref 21–32)
COLOR: YELLOW
CREAT SERPL-MCNC: 1.1 MG/DL (ref 0.6–1.2)
CREATININE URINE: 44.7 MG/DL (ref 28–259)
EOSINOPHILS ABSOLUTE: 0.1 K/UL (ref 0–0.6)
EOSINOPHILS RELATIVE PERCENT: 1.2 %
EPITHELIAL CELLS, UA: 2 /HPF (ref 0–5)
GFR AFRICAN AMERICAN: 60
GFR NON-AFRICAN AMERICAN: 49
GLUCOSE BLD-MCNC: 133 MG/DL (ref 70–99)
GLUCOSE URINE: NEGATIVE MG/DL
HCT VFR BLD CALC: 41.1 % (ref 36–48)
HEMOGLOBIN: 13.6 G/DL (ref 12–16)
HYALINE CASTS: 2 /LPF (ref 0–8)
KETONES, URINE: NEGATIVE MG/DL
LEUKOCYTE ESTERASE, URINE: ABNORMAL
LYMPHOCYTES ABSOLUTE: 0.8 K/UL (ref 1–5.1)
LYMPHOCYTES RELATIVE PERCENT: 17.3 %
MAGNESIUM: 1.7 MG/DL (ref 1.8–2.4)
MCH RBC QN AUTO: 28.9 PG (ref 26–34)
MCHC RBC AUTO-ENTMCNC: 33 G/DL (ref 31–36)
MCV RBC AUTO: 87.8 FL (ref 80–100)
MICROSCOPIC EXAMINATION: YES
MONOCYTES ABSOLUTE: 0.4 K/UL (ref 0–1.3)
MONOCYTES RELATIVE PERCENT: 9.2 %
NEUTROPHILS ABSOLUTE: 3.1 K/UL (ref 1.7–7.7)
NEUTROPHILS RELATIVE PERCENT: 72.1 %
NITRITE, URINE: NEGATIVE
PDW BLD-RTO: 13.8 % (ref 12.4–15.4)
PH UA: 6 (ref 5–8)
PHOSPHORUS: 3.4 MG/DL (ref 2.5–4.9)
PLATELET # BLD: 176 K/UL (ref 135–450)
PMV BLD AUTO: 8.8 FL (ref 5–10.5)
POTASSIUM SERPL-SCNC: 4.1 MMOL/L (ref 3.5–5.1)
PROTEIN PROTEIN: 68 MG/DL
PROTEIN UA: 100 MG/DL
RBC # BLD: 4.69 M/UL (ref 4–5.2)
RBC UA: 2 /HPF (ref 0–4)
SODIUM BLD-SCNC: 140 MMOL/L (ref 136–145)
SPECIFIC GRAVITY UA: 1.01 (ref 1–1.03)
T4 TOTAL: 10.6 UG/DL (ref 4.5–10.9)
TACROLIMUS BLOOD: 11.7 NG/ML (ref 5–20)
TSH SERPL DL<=0.05 MIU/L-ACNC: 3.72 UIU/ML (ref 0.27–4.2)
URINE TYPE: ABNORMAL
UROBILINOGEN, URINE: 0.2 E.U./DL
WBC # BLD: 4.3 K/UL (ref 4–11)
WBC UA: 66 /HPF (ref 0–5)

## 2020-11-06 PROCEDURE — 82570 ASSAY OF URINE CREATININE: CPT

## 2020-11-06 PROCEDURE — 80069 RENAL FUNCTION PANEL: CPT

## 2020-11-06 PROCEDURE — 81001 URINALYSIS AUTO W/SCOPE: CPT

## 2020-11-06 PROCEDURE — 84156 ASSAY OF PROTEIN URINE: CPT

## 2020-11-06 PROCEDURE — 87086 URINE CULTURE/COLONY COUNT: CPT

## 2020-11-06 PROCEDURE — 84436 ASSAY OF TOTAL THYROXINE: CPT

## 2020-11-06 PROCEDURE — 83735 ASSAY OF MAGNESIUM: CPT

## 2020-11-06 PROCEDURE — 84443 ASSAY THYROID STIM HORMONE: CPT

## 2020-11-06 PROCEDURE — 80197 ASSAY OF TACROLIMUS: CPT

## 2020-11-06 PROCEDURE — 85025 COMPLETE CBC W/AUTO DIFF WBC: CPT

## 2020-11-07 LAB — URINE CULTURE, ROUTINE: NORMAL

## 2021-02-26 ENCOUNTER — HOSPITAL ENCOUNTER (OUTPATIENT)
Age: 71
Discharge: HOME | End: 2021-02-26
Payer: MEDICARE

## 2021-02-26 LAB
ALBUMIN SERPL-MCNC: 4.1 G/DL (ref 3.4–5)
ANION GAP SERPL CALCULATED.3IONS-SCNC: 12 MMOL/L (ref 3–16)
BASOPHILS ABSOLUTE: 0 K/UL (ref 0–0.2)
BASOPHILS RELATIVE PERCENT: 0.1 %
BILIRUBIN URINE: NEGATIVE
BLOOD, URINE: ABNORMAL
BUN BLDV-MCNC: 24 MG/DL (ref 7–20)
CALCIUM SERPL-MCNC: 9.9 MG/DL (ref 8.3–10.6)
CHLORIDE BLD-SCNC: 106 MMOL/L (ref 99–110)
CLARITY: CLEAR
CO2: 23 MMOL/L (ref 21–32)
COLOR: YELLOW
CREAT SERPL-MCNC: 1.2 MG/DL (ref 0.6–1.2)
CREATININE URINE: 15.2 MG/DL (ref 28–259)
EOSINOPHILS ABSOLUTE: 0 K/UL (ref 0–0.6)
EOSINOPHILS RELATIVE PERCENT: 0.8 %
EPITHELIAL CELLS, UA: 2 /HPF (ref 0–5)
GFR AFRICAN AMERICAN: 54
GFR NON-AFRICAN AMERICAN: 44
GLUCOSE BLD-MCNC: 115 MG/DL (ref 70–99)
GLUCOSE URINE: NEGATIVE MG/DL
HCT VFR BLD CALC: 39.7 % (ref 36–48)
HEMOGLOBIN: 13.4 G/DL (ref 12–16)
HYALINE CASTS: 0 /LPF (ref 0–8)
KETONES, URINE: NEGATIVE MG/DL
LEUKOCYTE ESTERASE, URINE: NEGATIVE
LYMPHOCYTES ABSOLUTE: 1.3 K/UL (ref 1–5.1)
LYMPHOCYTES RELATIVE PERCENT: 27.1 %
MAGNESIUM: 1.3 MG/DL (ref 1.8–2.4)
MCH RBC QN AUTO: 29.2 PG (ref 26–34)
MCHC RBC AUTO-ENTMCNC: 33.9 G/DL (ref 31–36)
MCV RBC AUTO: 86.3 FL (ref 80–100)
MICROSCOPIC EXAMINATION: YES
MONOCYTES ABSOLUTE: 0.4 K/UL (ref 0–1.3)
MONOCYTES RELATIVE PERCENT: 9.2 %
NEUTROPHILS ABSOLUTE: 3 K/UL (ref 1.7–7.7)
NEUTROPHILS RELATIVE PERCENT: 62.8 %
NITRITE, URINE: NEGATIVE
PDW BLD-RTO: 13.2 % (ref 12.4–15.4)
PH UA: 6 (ref 5–8)
PHOSPHORUS: 3.1 MG/DL (ref 2.5–4.9)
PLATELET # BLD: 196 K/UL (ref 135–450)
PMV BLD AUTO: 8.8 FL (ref 5–10.5)
POTASSIUM SERPL-SCNC: 4 MMOL/L (ref 3.5–5.1)
PROTEIN PROTEIN: 120 MG/DL
PROTEIN UA: 100 MG/DL
RBC # BLD: 4.6 M/UL (ref 4–5.2)
RBC UA: 2 /HPF (ref 0–4)
SODIUM BLD-SCNC: 141 MMOL/L (ref 136–145)
SPECIFIC GRAVITY UA: 1.01 (ref 1–1.03)
URINE TYPE: ABNORMAL
UROBILINOGEN, URINE: 0.2 E.U./DL
WBC # BLD: 4.8 K/UL (ref 4–11)
WBC UA: 4 /HPF (ref 0–5)

## 2021-02-26 PROCEDURE — 87086 URINE CULTURE/COLONY COUNT: CPT

## 2021-02-26 PROCEDURE — 83735 ASSAY OF MAGNESIUM: CPT

## 2021-02-26 PROCEDURE — 84156 ASSAY OF PROTEIN URINE: CPT

## 2021-02-26 PROCEDURE — 81001 URINALYSIS AUTO W/SCOPE: CPT

## 2021-02-26 PROCEDURE — 85025 COMPLETE CBC W/AUTO DIFF WBC: CPT

## 2021-02-26 PROCEDURE — 82570 ASSAY OF URINE CREATININE: CPT

## 2021-02-26 PROCEDURE — 80069 RENAL FUNCTION PANEL: CPT

## 2021-02-26 PROCEDURE — 80197 ASSAY OF TACROLIMUS: CPT

## 2021-02-27 LAB
TACROLIMUS BLOOD: 12.5 NG/ML (ref 5–20)
URINE CULTURE, ROUTINE: NORMAL

## 2021-09-03 ENCOUNTER — HOSPITAL ENCOUNTER (OUTPATIENT)
Age: 71
Discharge: HOME | End: 2021-09-03
Payer: MEDICARE

## 2021-09-03 LAB
ALBUMIN SERPL-MCNC: 3.9 G/DL (ref 3.4–5)
ANION GAP SERPL CALCULATED.3IONS-SCNC: 15 MMOL/L (ref 3–16)
BACTERIA: ABNORMAL /HPF
BASOPHILS ABSOLUTE: 0 K/UL (ref 0–0.2)
BASOPHILS RELATIVE PERCENT: 0.3 %
BILIRUBIN URINE: NEGATIVE
BLOOD, URINE: ABNORMAL
BUN BLDV-MCNC: 21 MG/DL (ref 7–20)
CALCIUM SERPL-MCNC: 9.5 MG/DL (ref 8.3–10.6)
CHLORIDE BLD-SCNC: 101 MMOL/L (ref 99–110)
CLARITY: ABNORMAL
CO2: 22 MMOL/L (ref 21–32)
COLOR: YELLOW
CREAT SERPL-MCNC: 1.4 MG/DL (ref 0.6–1.2)
CREATININE URINE: 108.8 MG/DL (ref 28–259)
EOSINOPHILS ABSOLUTE: 0 K/UL (ref 0–0.6)
EOSINOPHILS RELATIVE PERCENT: 1 %
EPITHELIAL CELLS, UA: 9 /HPF (ref 0–5)
GFR AFRICAN AMERICAN: 45
GFR NON-AFRICAN AMERICAN: 37
GLUCOSE BLD-MCNC: 128 MG/DL (ref 70–99)
GLUCOSE URINE: NEGATIVE MG/DL
HCT VFR BLD CALC: 38.6 % (ref 36–48)
HEMOGLOBIN: 13 G/DL (ref 12–16)
HYALINE CASTS: 5 /LPF (ref 0–8)
KETONES, URINE: NEGATIVE MG/DL
LEUKOCYTE ESTERASE, URINE: ABNORMAL
LYMPHOCYTES ABSOLUTE: 1.3 K/UL (ref 1–5.1)
LYMPHOCYTES RELATIVE PERCENT: 27.5 %
MAGNESIUM: 1.7 MG/DL (ref 1.8–2.4)
MCH RBC QN AUTO: 30.5 PG (ref 26–34)
MCHC RBC AUTO-ENTMCNC: 33.7 G/DL (ref 31–36)
MCV RBC AUTO: 90.4 FL (ref 80–100)
MICROSCOPIC EXAMINATION: YES
MONOCYTES ABSOLUTE: 0.6 K/UL (ref 0–1.3)
MONOCYTES RELATIVE PERCENT: 12.4 %
NEUTROPHILS ABSOLUTE: 2.7 K/UL (ref 1.7–7.7)
NEUTROPHILS RELATIVE PERCENT: 58.8 %
NITRITE, URINE: NEGATIVE
PDW BLD-RTO: 14 % (ref 12.4–15.4)
PH UA: 6 (ref 5–8)
PHOSPHORUS: 3.9 MG/DL (ref 2.5–4.9)
PLATELET # BLD: 164 K/UL (ref 135–450)
PMV BLD AUTO: 8.7 FL (ref 5–10.5)
POTASSIUM SERPL-SCNC: 4 MMOL/L (ref 3.5–5.1)
PROTEIN PROTEIN: 780 MG/DL
PROTEIN UA: >300 MG/DL
RBC # BLD: 4.27 M/UL (ref 4–5.2)
RBC UA: 4 /HPF (ref 0–4)
SODIUM BLD-SCNC: 138 MMOL/L (ref 136–145)
SPECIFIC GRAVITY UA: 1.02 (ref 1–1.03)
URINE TYPE: ABNORMAL
UROBILINOGEN, URINE: 0.2 E.U./DL
WBC # BLD: 4.6 K/UL (ref 4–11)
WBC UA: 17 /HPF (ref 0–5)

## 2021-09-03 PROCEDURE — 84156 ASSAY OF PROTEIN URINE: CPT

## 2021-09-03 PROCEDURE — 81001 URINALYSIS AUTO W/SCOPE: CPT

## 2021-09-03 PROCEDURE — 85025 COMPLETE CBC W/AUTO DIFF WBC: CPT

## 2021-09-03 PROCEDURE — 83735 ASSAY OF MAGNESIUM: CPT

## 2021-09-03 PROCEDURE — 80069 RENAL FUNCTION PANEL: CPT

## 2021-09-03 PROCEDURE — 82570 ASSAY OF URINE CREATININE: CPT

## 2021-09-03 PROCEDURE — 80197 ASSAY OF TACROLIMUS: CPT

## 2021-09-03 PROCEDURE — 87086 URINE CULTURE/COLONY COUNT: CPT

## 2021-09-04 LAB — TACROLIMUS BLOOD: 4.3 NG/ML (ref 5–20)

## 2021-09-05 LAB — URINE CULTURE, ROUTINE: NORMAL

## 2021-12-18 ENCOUNTER — HOSPITAL ENCOUNTER (OUTPATIENT)
Age: 71
Discharge: HOME | End: 2021-12-18
Payer: MEDICARE

## 2021-12-18 LAB
ALBUMIN SERPL-MCNC: 4.4 G/DL (ref 3.4–5)
ANION GAP SERPL CALCULATED.3IONS-SCNC: 14 MMOL/L (ref 3–16)
BASOPHILS ABSOLUTE: 0 K/UL (ref 0–0.2)
BASOPHILS RELATIVE PERCENT: 0.2 %
BILIRUBIN URINE: NEGATIVE
BLOOD, URINE: NEGATIVE
BUN BLDV-MCNC: 28 MG/DL (ref 7–20)
CALCIUM SERPL-MCNC: 9.5 MG/DL (ref 8.3–10.6)
CHLORIDE BLD-SCNC: 109 MMOL/L (ref 99–110)
CLARITY: CLEAR
CO2: 22 MMOL/L (ref 21–32)
COLOR: YELLOW
CREAT SERPL-MCNC: 1.2 MG/DL (ref 0.6–1.2)
CREATININE URINE: 38.3 MG/DL (ref 28–259)
EOSINOPHILS ABSOLUTE: 0.1 K/UL (ref 0–0.6)
EOSINOPHILS RELATIVE PERCENT: 2.6 %
EPITHELIAL CELLS, UA: 2 /HPF (ref 0–5)
GFR AFRICAN AMERICAN: 54
GFR NON-AFRICAN AMERICAN: 44
GLUCOSE BLD-MCNC: 131 MG/DL (ref 70–99)
GLUCOSE URINE: NEGATIVE MG/DL
HCT VFR BLD CALC: 36.9 % (ref 36–48)
HEMOGLOBIN: 12.1 G/DL (ref 12–16)
HYALINE CASTS: 2 /LPF (ref 0–8)
KETONES, URINE: NEGATIVE MG/DL
LEUKOCYTE ESTERASE, URINE: NEGATIVE
LYMPHOCYTES ABSOLUTE: 1.1 K/UL (ref 1–5.1)
LYMPHOCYTES RELATIVE PERCENT: 26.6 %
MAGNESIUM: 2.1 MG/DL (ref 1.8–2.4)
MCH RBC QN AUTO: 28.7 PG (ref 26–34)
MCHC RBC AUTO-ENTMCNC: 32.9 G/DL (ref 31–36)
MCV RBC AUTO: 87.1 FL (ref 80–100)
MICROSCOPIC EXAMINATION: YES
MONOCYTES ABSOLUTE: 0.5 K/UL (ref 0–1.3)
MONOCYTES RELATIVE PERCENT: 11.4 %
NEUTROPHILS ABSOLUTE: 2.4 K/UL (ref 1.7–7.7)
NEUTROPHILS RELATIVE PERCENT: 59.2 %
NITRITE, URINE: NEGATIVE
PDW BLD-RTO: 13.2 % (ref 12.4–15.4)
PH UA: 6 (ref 5–8)
PHOSPHORUS: 3.8 MG/DL (ref 2.5–4.9)
PLATELET # BLD: 172 K/UL (ref 135–450)
PMV BLD AUTO: 8.2 FL (ref 5–10.5)
POTASSIUM SERPL-SCNC: 4.2 MMOL/L (ref 3.5–5.1)
PROTEIN PROTEIN: 117 MG/DL
PROTEIN UA: 100 MG/DL
RBC # BLD: 4.23 M/UL (ref 4–5.2)
RBC UA: 1 /HPF (ref 0–4)
SODIUM BLD-SCNC: 145 MMOL/L (ref 136–145)
SPECIFIC GRAVITY UA: 1.01 (ref 1–1.03)
URINE TYPE: ABNORMAL
UROBILINOGEN, URINE: 0.2 E.U./DL
WBC # BLD: 4.1 K/UL (ref 4–11)
WBC UA: 2 /HPF (ref 0–5)

## 2021-12-18 PROCEDURE — 81001 URINALYSIS AUTO W/SCOPE: CPT

## 2021-12-18 PROCEDURE — 84156 ASSAY OF PROTEIN URINE: CPT

## 2021-12-18 PROCEDURE — 87086 URINE CULTURE/COLONY COUNT: CPT

## 2021-12-18 PROCEDURE — 83735 ASSAY OF MAGNESIUM: CPT

## 2021-12-18 PROCEDURE — 80197 ASSAY OF TACROLIMUS: CPT

## 2021-12-18 PROCEDURE — 82570 ASSAY OF URINE CREATININE: CPT

## 2021-12-18 PROCEDURE — 85025 COMPLETE CBC W/AUTO DIFF WBC: CPT

## 2021-12-18 PROCEDURE — 80069 RENAL FUNCTION PANEL: CPT

## 2021-12-19 LAB
TACROLIMUS BLOOD: 2.7 NG/ML (ref 5–20)
URINE CULTURE, ROUTINE: NORMAL

## 2021-12-29 ENCOUNTER — HOSPITAL ENCOUNTER (OUTPATIENT)
Age: 71
Discharge: HOME | End: 2021-12-29
Payer: MEDICARE

## 2021-12-29 LAB — TACROLIMUS BLOOD: 2.7 NG/ML (ref 5–20)

## 2021-12-29 PROCEDURE — 36415 COLL VENOUS BLD VENIPUNCTURE: CPT

## 2021-12-29 PROCEDURE — 80197 ASSAY OF TACROLIMUS: CPT

## 2022-03-04 ENCOUNTER — HOSPITAL ENCOUNTER (OUTPATIENT)
Age: 72
Discharge: HOME | End: 2022-03-04
Payer: MEDICARE

## 2022-03-04 LAB
ALBUMIN SERPL-MCNC: 4.3 G/DL (ref 3.4–5)
ANION GAP SERPL CALCULATED.3IONS-SCNC: 15 MMOL/L (ref 3–16)
BACTERIA: ABNORMAL /HPF
BASOPHILS ABSOLUTE: 0 K/UL (ref 0–0.2)
BASOPHILS RELATIVE PERCENT: 0.2 %
BILIRUBIN URINE: NEGATIVE
BLOOD, URINE: ABNORMAL
BUN BLDV-MCNC: 33 MG/DL (ref 7–20)
CALCIUM SERPL-MCNC: 9.6 MG/DL (ref 8.3–10.6)
CHLORIDE BLD-SCNC: 106 MMOL/L (ref 99–110)
CLARITY: ABNORMAL
CO2: 22 MMOL/L (ref 21–32)
COLOR: YELLOW
CREAT SERPL-MCNC: 1.3 MG/DL (ref 0.6–1.2)
CREATININE URINE: 26.2 MG/DL (ref 28–259)
EOSINOPHILS ABSOLUTE: 0.1 K/UL (ref 0–0.6)
EOSINOPHILS RELATIVE PERCENT: 1.7 %
EPITHELIAL CELLS, UA: 5 /HPF (ref 0–5)
GFR AFRICAN AMERICAN: 49
GFR NON-AFRICAN AMERICAN: 40
GLUCOSE BLD-MCNC: 123 MG/DL (ref 70–99)
GLUCOSE URINE: NEGATIVE MG/DL
HCT VFR BLD CALC: 39 % (ref 36–48)
HEMOGLOBIN: 13 G/DL (ref 12–16)
HYALINE CASTS: 1 /LPF (ref 0–8)
KETONES, URINE: NEGATIVE MG/DL
LEUKOCYTE ESTERASE, URINE: ABNORMAL
LYMPHOCYTES ABSOLUTE: 1.3 K/UL (ref 1–5.1)
LYMPHOCYTES RELATIVE PERCENT: 24.9 %
MAGNESIUM: 2 MG/DL (ref 1.8–2.4)
MCH RBC QN AUTO: 28.5 PG (ref 26–34)
MCHC RBC AUTO-ENTMCNC: 33.4 G/DL (ref 31–36)
MCV RBC AUTO: 85.2 FL (ref 80–100)
MICROSCOPIC EXAMINATION: YES
MONOCYTES ABSOLUTE: 0.5 K/UL (ref 0–1.3)
MONOCYTES RELATIVE PERCENT: 9.9 %
NEUTROPHILS ABSOLUTE: 3.3 K/UL (ref 1.7–7.7)
NEUTROPHILS RELATIVE PERCENT: 63.3 %
NITRITE, URINE: NEGATIVE
PDW BLD-RTO: 14.2 % (ref 12.4–15.4)
PH UA: 6 (ref 5–8)
PHOSPHORUS: 3.9 MG/DL (ref 2.5–4.9)
PLATELET # BLD: 190 K/UL (ref 135–450)
PMV BLD AUTO: 8.2 FL (ref 5–10.5)
POTASSIUM SERPL-SCNC: 4.3 MMOL/L (ref 3.5–5.1)
PROTEIN PROTEIN: 155 MG/DL
PROTEIN UA: 100 MG/DL
RBC # BLD: 4.58 M/UL (ref 4–5.2)
RBC UA: 2 /HPF (ref 0–4)
SODIUM BLD-SCNC: 143 MMOL/L (ref 136–145)
SPECIFIC GRAVITY UA: 1.02 (ref 1–1.03)
TACROLIMUS BLOOD: 8.2 NG/ML (ref 5–20)
URINE TYPE: ABNORMAL
UROBILINOGEN, URINE: 0.2 E.U./DL
WBC # BLD: 5.2 K/UL (ref 4–11)
WBC UA: 27 /HPF (ref 0–5)

## 2022-03-04 PROCEDURE — 82570 ASSAY OF URINE CREATININE: CPT

## 2022-03-04 PROCEDURE — 84156 ASSAY OF PROTEIN URINE: CPT

## 2022-03-04 PROCEDURE — 83735 ASSAY OF MAGNESIUM: CPT

## 2022-03-04 PROCEDURE — 85025 COMPLETE CBC W/AUTO DIFF WBC: CPT

## 2022-03-04 PROCEDURE — 81001 URINALYSIS AUTO W/SCOPE: CPT

## 2022-03-04 PROCEDURE — 87086 URINE CULTURE/COLONY COUNT: CPT

## 2022-03-04 PROCEDURE — 80069 RENAL FUNCTION PANEL: CPT

## 2022-03-04 PROCEDURE — 80197 ASSAY OF TACROLIMUS: CPT

## 2022-03-05 LAB — URINE CULTURE, ROUTINE: NORMAL

## 2022-04-28 ENCOUNTER — INITIAL CONSULT (OUTPATIENT)
Dept: SURGERY | Age: 72
End: 2022-04-28
Payer: MEDICARE

## 2022-04-28 VITALS — DIASTOLIC BLOOD PRESSURE: 70 MMHG | BODY MASS INDEX: 33.11 KG/M2 | WEIGHT: 193 LBS | SYSTOLIC BLOOD PRESSURE: 110 MMHG

## 2022-04-28 DIAGNOSIS — L02.419 ABSCESS, AXILLA: Primary | ICD-10-CM

## 2022-04-28 PROCEDURE — 10060 I&D ABSCESS SIMPLE/SINGLE: CPT | Performed by: SURGERY

## 2022-04-28 RX ORDER — SULFAMETHOXAZOLE AND TRIMETHOPRIM 800; 160 MG/1; MG/1
1 TABLET ORAL 2 TIMES DAILY
Qty: 10 TABLET | Refills: 0 | Status: SHIPPED | OUTPATIENT
Start: 2022-04-28 | End: 2022-05-03

## 2022-04-28 NOTE — PROGRESS NOTES
Office Procedure    Pre op Dx-abscess left axilla    Post op Dx-same    Procedure-incision and drainage of abscess of the left    Surgeon-Dr. Akshat Ahuja    Anesthesia-local    EBL-min    Operative indications and consent-80year-old female with an abscess of the left axilla. She has several areas of surrounding folliculitis. The plan is for incision and drainage of the abscess. Patient explained risks,benefits, complications and alternatives. Procedure-Patient prepped and draped in the usual sterile fashion. Supine position. The skin and subcutaneous tissues were infiltrated with 1% lidocaine. A 15 mm cruciate incision was made over the maximum point of fluctuance in the left axilla. Several cc of purulent fluid was expressed. Hemostasis was achieved with pressure. A dressing was applied. The patient tolerated the procedure without difficulty. Disposition- keep I&D site covered with dry bandage. Wash with antibacterial soap. Bactrim DS 1 p.o. twice daily for 5 days. Follow-up in 10 to 14 days.

## 2022-05-09 ENCOUNTER — OFFICE VISIT (OUTPATIENT)
Dept: SURGERY | Age: 72
End: 2022-05-09
Payer: MEDICARE

## 2022-05-09 VITALS — SYSTOLIC BLOOD PRESSURE: 120 MMHG | BODY MASS INDEX: 33.11 KG/M2 | WEIGHT: 193 LBS | DIASTOLIC BLOOD PRESSURE: 80 MMHG

## 2022-05-09 DIAGNOSIS — L02.419 ABSCESS, AXILLA: Primary | ICD-10-CM

## 2022-05-09 PROCEDURE — 99212 OFFICE O/P EST SF 10 MIN: CPT | Performed by: SURGERY

## 2022-05-09 RX ORDER — METOPROLOL SUCCINATE 100 MG/1
100 TABLET, EXTENDED RELEASE ORAL DAILY
COMMUNITY

## 2022-05-09 NOTE — PROGRESS NOTES
Subjective:      Chief complaint-hidradenitis    Patient ID: HPI- Shanice Jung is a 80 y.o. female in in follow-up for hidradenitis    HPI    Review of Systems    Objective:   Physical Exam  Constitutional:       Appearance: She is well-developed. HENT:      Head: Normocephalic and atraumatic. Right Ear: External ear normal.      Left Ear: External ear normal.   Eyes:      Conjunctiva/sclera: Conjunctivae normal.   Musculoskeletal:         General: Normal range of motion. Cervical back: Normal range of motion and neck supple. Skin:     General: Skin is warm and dry. Neurological:      Mental Status: She is alert and oriented to person, place, and time. Psychiatric:         Behavior: Behavior normal.       I&D site healing well  Assessment:      78-year-old female seen in follow-up for axillary hidradenitis. She is status post recent I&D in the left axillary region. The I&D site is progressing well. She has several smaller area which did not require I&D. These appear to be slowly resolving. Plan:      Continue to wash with antibacterial soap. No need for further antibiotics. Follow-up as needed.         Evelia Weir MD

## 2022-06-24 ENCOUNTER — OFFICE VISIT (OUTPATIENT)
Dept: SURGERY | Age: 72
End: 2022-06-24
Payer: MEDICARE

## 2022-06-24 VITALS — SYSTOLIC BLOOD PRESSURE: 122 MMHG | WEIGHT: 194 LBS | BODY MASS INDEX: 33.3 KG/M2 | DIASTOLIC BLOOD PRESSURE: 78 MMHG

## 2022-06-24 DIAGNOSIS — L02.419 ABSCESS, AXILLA: Primary | ICD-10-CM

## 2022-06-24 PROCEDURE — 10060 I&D ABSCESS SIMPLE/SINGLE: CPT | Performed by: SURGERY

## 2022-06-24 NOTE — PROGRESS NOTES
Pre op Dx-left axillary abscess    Post op Dx-same    Procedure- incision and drainage of left axillary abscess    Surgeon-Dr. Maco Rodriguez    Anesthesia-local    EBL-min    Operative indications and consent-80year-old female with a left axillary abscess. The plan is for incision and drainage. Patient explained risks,benefits, complications and alternatives. Procedure-Patient prepped and draped in the usual sterile fashion. Supine position with her left arm above her head. There was an area of approximately 1 cm of necrotic skin within the central portion of the abscess. This was sharply excised. We also excised some underlying necrotic subcutaneous tissue. Once the subcutaneous tissue was excised, we were able to express a large amount of purulent fluid. A culture was obtained. Hemostasis was achieved with pressure. A dressing was then applied. Disposition: May shower. Keep dry bandage over site. Keflex 500 mg p.o. 3 times daily for 7 days. Will adjust antibiotics according to cultures if needed. Follow-up in 1 week.

## 2022-06-27 LAB
GRAM STAIN RESULT: ABNORMAL
ORGANISM: ABNORMAL
WOUND/ABSCESS: ABNORMAL

## 2022-06-27 RX ORDER — CEPHALEXIN 500 MG/1
500 CAPSULE ORAL 3 TIMES DAILY
Qty: 21 CAPSULE | Refills: 0 | Status: SHIPPED | OUTPATIENT
Start: 2022-06-27 | End: 2022-07-04

## 2022-06-27 RX ORDER — SULFAMETHOXAZOLE AND TRIMETHOPRIM 400; 80 MG/1; MG/1
1 TABLET ORAL 2 TIMES DAILY
Qty: 14 TABLET | Refills: 0 | Status: SHIPPED | OUTPATIENT
Start: 2022-06-27 | End: 2022-07-04

## 2022-07-01 ENCOUNTER — OFFICE VISIT (OUTPATIENT)
Dept: SURGERY | Age: 72
End: 2022-07-01
Payer: MEDICARE

## 2022-07-01 VITALS — DIASTOLIC BLOOD PRESSURE: 88 MMHG | BODY MASS INDEX: 33.3 KG/M2 | WEIGHT: 194 LBS | SYSTOLIC BLOOD PRESSURE: 126 MMHG

## 2022-07-01 DIAGNOSIS — L02.419 ABSCESS, AXILLA: Primary | ICD-10-CM

## 2022-07-01 PROCEDURE — 99212 OFFICE O/P EST SF 10 MIN: CPT | Performed by: SURGERY

## 2022-07-01 PROCEDURE — 1123F ACP DISCUSS/DSCN MKR DOCD: CPT | Performed by: SURGERY

## 2022-07-01 NOTE — PROGRESS NOTES
Subjective:      Chief complaint-hidradenitis left axilla    Patient ID: HPI- Storm Velez is a 80 y.o. female seen in follow-up for left axillary hidradenitis. HPI    Review of Systems    Objective:   Physical Exam  Constitutional:       Appearance: She is well-developed. HENT:      Head: Normocephalic and atraumatic. Right Ear: External ear normal.      Left Ear: External ear normal.   Eyes:      Conjunctiva/sclera: Conjunctivae normal.   Musculoskeletal:         General: Normal range of motion. Cervical back: Normal range of motion and neck supple. Skin:     General: Skin is warm and dry. Neurological:      Mental Status: She is alert and oriented to person, place, and time. Psychiatric:         Behavior: Behavior normal.       I&D site healing well. Assessment:      76-year-old female seen in follow-up status post incision and drainage of an abscess of the left axilla. The I&D site is still open but is progressing well.         Plan:      Finish course of oral antibiotic  Dry bandage over site until it heals  Continue to wash with antibacterial soap  Follow-up as needed        Samreen Lynch MD

## 2022-10-10 ENCOUNTER — HOSPITAL ENCOUNTER (OUTPATIENT)
Age: 72
Discharge: HOME | End: 2022-10-10
Payer: MEDICARE

## 2022-10-10 DIAGNOSIS — I10 ESSENTIAL HYPERTENSION: ICD-10-CM

## 2022-10-10 DIAGNOSIS — N18.30 BENIGN HYPERTENSION WITH CHRONIC KIDNEY DISEASE, STAGE III (HCC): ICD-10-CM

## 2022-10-10 DIAGNOSIS — I12.9 BENIGN HYPERTENSION WITH CHRONIC KIDNEY DISEASE, STAGE III (HCC): ICD-10-CM

## 2022-10-10 DIAGNOSIS — Z94.0 KIDNEY TRANSPLANT RECIPIENT: ICD-10-CM

## 2022-10-10 DIAGNOSIS — Z79.621 ENCOUNTER FOR MONITORING TACROLIMUS THERAPY: ICD-10-CM

## 2022-10-10 DIAGNOSIS — Z51.81 ENCOUNTER FOR MONITORING TACROLIMUS THERAPY: ICD-10-CM

## 2022-10-10 LAB
ALBUMIN SERPL-MCNC: 4.1 G/DL (ref 3.4–5)
ANION GAP SERPL CALCULATED.3IONS-SCNC: 15 MMOL/L (ref 3–16)
BACTERIA: ABNORMAL /HPF
BILIRUBIN URINE: NEGATIVE
BLOOD, URINE: ABNORMAL
BUN BLDV-MCNC: 24 MG/DL (ref 7–20)
CALCIUM SERPL-MCNC: 9.6 MG/DL (ref 8.3–10.6)
CHLORIDE BLD-SCNC: 108 MMOL/L (ref 99–110)
CLARITY: ABNORMAL
CO2: 22 MMOL/L (ref 21–32)
COLOR: YELLOW
CREAT SERPL-MCNC: 1.4 MG/DL (ref 0.6–1.2)
CREATININE URINE: 44.2 MG/DL (ref 28–259)
EPITHELIAL CELLS, UA: 4 /HPF (ref 0–5)
GFR AFRICAN AMERICAN: 45
GFR NON-AFRICAN AMERICAN: 37
GLUCOSE BLD-MCNC: 149 MG/DL (ref 70–99)
GLUCOSE URINE: NEGATIVE MG/DL
HCT VFR BLD CALC: 37.2 % (ref 36–48)
HEMOGLOBIN: 12.2 G/DL (ref 12–16)
HYALINE CASTS: 2 /LPF (ref 0–8)
KETONES, URINE: NEGATIVE MG/DL
LEUKOCYTE ESTERASE, URINE: ABNORMAL
MAGNESIUM: 1.9 MG/DL (ref 1.8–2.4)
MCH RBC QN AUTO: 28 PG (ref 26–34)
MCHC RBC AUTO-ENTMCNC: 32.8 G/DL (ref 31–36)
MCV RBC AUTO: 85.5 FL (ref 80–100)
MICROALBUMIN UR-MCNC: 119.2 MG/DL
MICROALBUMIN/CREAT UR-RTO: 2696.8 MG/G (ref 0–30)
MICROSCOPIC EXAMINATION: YES
NITRITE, URINE: NEGATIVE
PARATHYROID HORMONE INTACT: 114.9 PG/ML (ref 14–72)
PDW BLD-RTO: 13.6 % (ref 12.4–15.4)
PH UA: 6 (ref 5–8)
PHOSPHORUS: 3.3 MG/DL (ref 2.5–4.9)
PLATELET # BLD: 162 K/UL (ref 135–450)
PMV BLD AUTO: 8.2 FL (ref 5–10.5)
POTASSIUM SERPL-SCNC: 3.8 MMOL/L (ref 3.5–5.1)
PROTEIN PROTEIN: 160 MG/DL
PROTEIN UA: 300 MG/DL
PROTEIN/CREAT RATIO: 3.6 MG/DL
RBC # BLD: 4.35 M/UL (ref 4–5.2)
RBC UA: 1 /HPF (ref 0–4)
SODIUM BLD-SCNC: 145 MMOL/L (ref 136–145)
SPECIFIC GRAVITY UA: 1.01 (ref 1–1.03)
URINE TYPE: ABNORMAL
UROBILINOGEN, URINE: 0.2 E.U./DL
VITAMIN D 25-HYDROXY: 17.7 NG/ML
WBC # BLD: 4 K/UL (ref 4–11)
WBC UA: 20 /HPF (ref 0–5)

## 2022-10-10 PROCEDURE — 80069 RENAL FUNCTION PANEL: CPT

## 2022-10-10 PROCEDURE — 83735 ASSAY OF MAGNESIUM: CPT

## 2022-10-10 PROCEDURE — 81001 URINALYSIS AUTO W/SCOPE: CPT

## 2022-10-10 PROCEDURE — 80197 ASSAY OF TACROLIMUS: CPT

## 2022-10-10 PROCEDURE — 83970 ASSAY OF PARATHORMONE: CPT

## 2022-10-10 PROCEDURE — 36415 COLL VENOUS BLD VENIPUNCTURE: CPT

## 2022-10-10 PROCEDURE — 82570 ASSAY OF URINE CREATININE: CPT

## 2022-10-10 PROCEDURE — 84156 ASSAY OF PROTEIN URINE: CPT

## 2022-10-10 PROCEDURE — 82306 VITAMIN D 25 HYDROXY: CPT

## 2022-10-10 PROCEDURE — 82043 UR ALBUMIN QUANTITATIVE: CPT

## 2022-10-10 PROCEDURE — 85027 COMPLETE CBC AUTOMATED: CPT

## 2022-10-11 LAB — TACROLIMUS BLOOD: 16.7 NG/ML (ref 5–20)

## 2022-12-06 ENCOUNTER — HOSPITAL ENCOUNTER (OUTPATIENT)
Age: 72
Discharge: HOME | End: 2022-12-06
Payer: MEDICARE

## 2022-12-06 DIAGNOSIS — N25.81 SECONDARY HYPERPARATHYROIDISM OF RENAL ORIGIN (HCC): ICD-10-CM

## 2022-12-06 DIAGNOSIS — N18.31 STAGE 3A CHRONIC KIDNEY DISEASE (HCC): ICD-10-CM

## 2022-12-06 DIAGNOSIS — N18.30 BENIGN HYPERTENSION WITH CHRONIC KIDNEY DISEASE, STAGE III (HCC): ICD-10-CM

## 2022-12-06 DIAGNOSIS — Z92.25 PERSONAL HISTORY OF IMMUNOSUPPRESSIVE THERAPY: ICD-10-CM

## 2022-12-06 DIAGNOSIS — Z94.0 KIDNEY TRANSPLANT RECIPIENT: ICD-10-CM

## 2022-12-06 DIAGNOSIS — I12.9 BENIGN HYPERTENSION WITH CHRONIC KIDNEY DISEASE, STAGE III (HCC): ICD-10-CM

## 2022-12-06 DIAGNOSIS — Z79.621 ENCOUNTER FOR MONITORING TACROLIMUS THERAPY: ICD-10-CM

## 2022-12-06 DIAGNOSIS — Z51.81 ENCOUNTER FOR MONITORING TACROLIMUS THERAPY: ICD-10-CM

## 2022-12-06 DIAGNOSIS — E83.42 HYPOMAGNESEMIA: ICD-10-CM

## 2022-12-06 DIAGNOSIS — E55.9 VITAMIN D DEFICIENCY: ICD-10-CM

## 2022-12-06 LAB
ALBUMIN SERPL-MCNC: 4.3 G/DL (ref 3.4–5)
ANION GAP SERPL CALCULATED.3IONS-SCNC: 16 MMOL/L (ref 3–16)
BACTERIA: ABNORMAL /HPF
BILIRUBIN URINE: NEGATIVE
BLOOD, URINE: NEGATIVE
BUN BLDV-MCNC: 28 MG/DL (ref 7–20)
CALCIUM SERPL-MCNC: 9.4 MG/DL (ref 8.3–10.6)
CHLORIDE BLD-SCNC: 107 MMOL/L (ref 99–110)
CLARITY: CLEAR
CO2: 24 MMOL/L (ref 21–32)
COLOR: YELLOW
CREAT SERPL-MCNC: 1.5 MG/DL (ref 0.6–1.2)
CREATININE URINE: 24.6 MG/DL (ref 28–259)
EPITHELIAL CELLS, UA: 3 /HPF (ref 0–5)
GFR SERPL CREATININE-BSD FRML MDRD: 37 ML/MIN/{1.73_M2}
GLUCOSE BLD-MCNC: 149 MG/DL (ref 70–99)
GLUCOSE URINE: NEGATIVE MG/DL
HCT VFR BLD CALC: 39.4 % (ref 36–48)
HEMOGLOBIN: 12.6 G/DL (ref 12–16)
HYALINE CASTS: 1 /LPF (ref 0–8)
KETONES, URINE: NEGATIVE MG/DL
LEUKOCYTE ESTERASE, URINE: ABNORMAL
MAGNESIUM: 1.8 MG/DL (ref 1.8–2.4)
MCH RBC QN AUTO: 27.4 PG (ref 26–34)
MCHC RBC AUTO-ENTMCNC: 32 G/DL (ref 31–36)
MCV RBC AUTO: 85.6 FL (ref 80–100)
MICROALBUMIN UR-MCNC: 81.7 MG/DL
MICROALBUMIN/CREAT UR-RTO: 3321.1 MG/G (ref 0–30)
MICROSCOPIC EXAMINATION: YES
NITRITE, URINE: NEGATIVE
PARATHYROID HORMONE INTACT: 189.9 PG/ML (ref 14–72)
PDW BLD-RTO: 14 % (ref 12.4–15.4)
PH UA: 6 (ref 5–8)
PHOSPHORUS: 3.3 MG/DL (ref 2.5–4.9)
PLATELET # BLD: 205 K/UL (ref 135–450)
PMV BLD AUTO: 8.4 FL (ref 5–10.5)
POTASSIUM SERPL-SCNC: 4.1 MMOL/L (ref 3.5–5.1)
PROTEIN PROTEIN: 93 MG/DL
PROTEIN UA: 300 MG/DL
PROTEIN/CREAT RATIO: 3.8 MG/DL
RBC # BLD: 4.6 M/UL (ref 4–5.2)
RBC UA: 1 /HPF (ref 0–4)
SODIUM BLD-SCNC: 147 MMOL/L (ref 136–145)
SPECIFIC GRAVITY UA: 1.01 (ref 1–1.03)
URINE TYPE: ABNORMAL
UROBILINOGEN, URINE: 0.2 E.U./DL
VITAMIN D 25-HYDROXY: 29 NG/ML
WBC # BLD: 5.7 K/UL (ref 4–11)
WBC UA: 3 /HPF (ref 0–5)

## 2022-12-06 PROCEDURE — 82306 VITAMIN D 25 HYDROXY: CPT

## 2022-12-06 PROCEDURE — 85027 COMPLETE CBC AUTOMATED: CPT

## 2022-12-06 PROCEDURE — 80197 ASSAY OF TACROLIMUS: CPT

## 2022-12-06 PROCEDURE — 36415 COLL VENOUS BLD VENIPUNCTURE: CPT

## 2022-12-06 PROCEDURE — 84156 ASSAY OF PROTEIN URINE: CPT

## 2022-12-06 PROCEDURE — 80069 RENAL FUNCTION PANEL: CPT

## 2022-12-06 PROCEDURE — 83970 ASSAY OF PARATHORMONE: CPT

## 2022-12-06 PROCEDURE — 83036 HEMOGLOBIN GLYCOSYLATED A1C: CPT

## 2022-12-06 PROCEDURE — 82570 ASSAY OF URINE CREATININE: CPT

## 2022-12-06 PROCEDURE — 81001 URINALYSIS AUTO W/SCOPE: CPT

## 2022-12-06 PROCEDURE — 83735 ASSAY OF MAGNESIUM: CPT

## 2022-12-06 PROCEDURE — 82043 UR ALBUMIN QUANTITATIVE: CPT

## 2022-12-07 LAB
ESTIMATED AVERAGE GLUCOSE: 139.9 MG/DL
HBA1C MFR BLD: 6.5 %
TACROLIMUS BLOOD: 4.7 NG/ML (ref 5–20)

## 2023-03-20 ENCOUNTER — HOSPITAL ENCOUNTER (OUTPATIENT)
Age: 73
Discharge: HOME | End: 2023-03-20
Payer: MEDICARE

## 2023-03-20 DIAGNOSIS — N18.30 BENIGN HYPERTENSION WITH CHRONIC KIDNEY DISEASE, STAGE III (HCC): ICD-10-CM

## 2023-03-20 DIAGNOSIS — N25.81 SECONDARY HYPERPARATHYROIDISM OF RENAL ORIGIN (HCC): ICD-10-CM

## 2023-03-20 DIAGNOSIS — I12.9 BENIGN HYPERTENSION WITH CHRONIC KIDNEY DISEASE, STAGE III (HCC): ICD-10-CM

## 2023-03-20 DIAGNOSIS — E55.9 VITAMIN D DEFICIENCY: ICD-10-CM

## 2023-03-20 DIAGNOSIS — Z51.81 ENCOUNTER FOR MONITORING TACROLIMUS THERAPY: ICD-10-CM

## 2023-03-20 DIAGNOSIS — R73.03 PRE-DIABETES: ICD-10-CM

## 2023-03-20 DIAGNOSIS — Z94.0 KIDNEY TRANSPLANT RECIPIENT: ICD-10-CM

## 2023-03-20 DIAGNOSIS — E87.0 HYPERNATREMIA: ICD-10-CM

## 2023-03-20 DIAGNOSIS — Z79.621 ENCOUNTER FOR MONITORING TACROLIMUS THERAPY: ICD-10-CM

## 2023-03-20 LAB
25(OH)D3 SERPL-MCNC: 24.1 NG/ML
ALBUMIN SERPL-MCNC: 3.9 G/DL (ref 3.4–5)
ANION GAP SERPL CALCULATED.3IONS-SCNC: 18 MMOL/L (ref 3–16)
BACTERIA URNS QL MICRO: ABNORMAL /HPF
BILIRUB UR QL STRIP.AUTO: NEGATIVE
BUN SERPL-MCNC: 18 MG/DL (ref 7–20)
CALCIUM SERPL-MCNC: 9.6 MG/DL (ref 8.3–10.6)
CHLORIDE SERPL-SCNC: 108 MMOL/L (ref 99–110)
CLARITY UR: CLEAR
CO2 SERPL-SCNC: 21 MMOL/L (ref 21–32)
COLOR UR: YELLOW
CREAT SERPL-MCNC: 1.4 MG/DL (ref 0.6–1.2)
CREAT UR-MCNC: 57.2 MG/DL (ref 28–259)
DEPRECATED RDW RBC AUTO: 14.1 % (ref 12.4–15.4)
EPI CELLS #/AREA URNS AUTO: 3 /HPF (ref 0–5)
GFR SERPLBLD CREATININE-BSD FMLA CKD-EPI: 40 ML/MIN/{1.73_M2}
GLUCOSE SERPL-MCNC: 129 MG/DL (ref 70–99)
GLUCOSE UR STRIP.AUTO-MCNC: NEGATIVE MG/DL
HCT VFR BLD AUTO: 38.4 % (ref 36–48)
HGB BLD-MCNC: 12.4 G/DL (ref 12–16)
HGB UR QL STRIP.AUTO: ABNORMAL
HYALINE CASTS #/AREA URNS AUTO: 2 /LPF (ref 0–8)
KETONES UR STRIP.AUTO-MCNC: NEGATIVE MG/DL
LEUKOCYTE ESTERASE UR QL STRIP.AUTO: NEGATIVE
MAGNESIUM SERPL-MCNC: 1.5 MG/DL (ref 1.8–2.4)
MCH RBC QN AUTO: 27.5 PG (ref 26–34)
MCHC RBC AUTO-ENTMCNC: 32.4 G/DL (ref 31–36)
MCV RBC AUTO: 84.8 FL (ref 80–100)
MICROALBUMIN UR DL<=1MG/L-MCNC: 312.1 MG/DL
MICROALBUMIN/CREAT UR: 5456.3 MG/G (ref 0–30)
NITRITE UR QL STRIP.AUTO: NEGATIVE
PH UR STRIP.AUTO: 5.5 [PH] (ref 5–8)
PHOSPHATE SERPL-MCNC: 2.8 MG/DL (ref 2.5–4.9)
PLATELET # BLD AUTO: 253 K/UL (ref 135–450)
PMV BLD AUTO: 8.6 FL (ref 5–10.5)
POTASSIUM SERPL-SCNC: 3.8 MMOL/L (ref 3.5–5.1)
PROT UR STRIP.AUTO-MCNC: >=1000 MG/DL
PROT UR-MCNC: 392 MG/DL
PROT/CREAT UR-RTO: 6.9 MG/DL
PTH-INTACT SERPL-MCNC: 185.6 PG/ML (ref 14–72)
RBC # BLD AUTO: 4.52 M/UL (ref 4–5.2)
RBC CLUMPS #/AREA URNS AUTO: 1 /HPF (ref 0–4)
SODIUM SERPL-SCNC: 147 MMOL/L (ref 136–145)
SP GR UR STRIP.AUTO: 1.01 (ref 1–1.03)
UA DIPSTICK W REFLEX MICRO PNL UR: YES
URN SPEC COLLECT METH UR: ABNORMAL
UROBILINOGEN UR STRIP-ACNC: 0.2 E.U./DL
WBC # BLD AUTO: 6.1 K/UL (ref 4–11)
WBC #/AREA URNS AUTO: 12 /HPF (ref 0–5)

## 2023-03-20 PROCEDURE — 82306 VITAMIN D 25 HYDROXY: CPT

## 2023-03-20 PROCEDURE — 82043 UR ALBUMIN QUANTITATIVE: CPT

## 2023-03-20 PROCEDURE — 83970 ASSAY OF PARATHORMONE: CPT

## 2023-03-20 PROCEDURE — 84156 ASSAY OF PROTEIN URINE: CPT

## 2023-03-20 PROCEDURE — 82570 ASSAY OF URINE CREATININE: CPT

## 2023-03-20 PROCEDURE — 81001 URINALYSIS AUTO W/SCOPE: CPT

## 2023-03-20 PROCEDURE — 85027 COMPLETE CBC AUTOMATED: CPT

## 2023-03-20 PROCEDURE — 83735 ASSAY OF MAGNESIUM: CPT

## 2023-03-20 PROCEDURE — 80069 RENAL FUNCTION PANEL: CPT

## 2023-03-20 PROCEDURE — 83036 HEMOGLOBIN GLYCOSYLATED A1C: CPT

## 2023-03-20 PROCEDURE — 36415 COLL VENOUS BLD VENIPUNCTURE: CPT

## 2023-03-21 LAB
EST. AVERAGE GLUCOSE BLD GHB EST-MCNC: 131.2 MG/DL
HBA1C MFR BLD: 6.2 %

## 2023-03-28 ENCOUNTER — HOSPITAL ENCOUNTER (OUTPATIENT)
Age: 73
Discharge: HOME | End: 2023-03-28
Payer: MEDICARE

## 2023-03-28 DIAGNOSIS — I12.9 BENIGN HYPERTENSION WITH CHRONIC KIDNEY DISEASE, STAGE III (HCC): ICD-10-CM

## 2023-03-28 DIAGNOSIS — Z94.0 KIDNEY TRANSPLANT RECIPIENT: ICD-10-CM

## 2023-03-28 DIAGNOSIS — R80.1 PERSISTENT PROTEINURIA: ICD-10-CM

## 2023-03-28 DIAGNOSIS — N18.30 BENIGN HYPERTENSION WITH CHRONIC KIDNEY DISEASE, STAGE III (HCC): ICD-10-CM

## 2023-03-28 PROCEDURE — 36415 COLL VENOUS BLD VENIPUNCTURE: CPT

## 2023-03-28 PROCEDURE — 80197 ASSAY OF TACROLIMUS: CPT

## 2023-03-29 LAB — TACROLIMUS BLOOD: 5.4 NG/ML (ref 5–20)

## 2023-07-14 ENCOUNTER — HOSPITAL ENCOUNTER (OUTPATIENT)
Age: 73
Discharge: HOME | End: 2023-07-14
Payer: MEDICARE

## 2023-07-14 DIAGNOSIS — I12.9 BENIGN HYPERTENSION WITH CHRONIC KIDNEY DISEASE, STAGE III (HCC): ICD-10-CM

## 2023-07-14 DIAGNOSIS — N18.30 BENIGN HYPERTENSION WITH CHRONIC KIDNEY DISEASE, STAGE III (HCC): ICD-10-CM

## 2023-07-14 DIAGNOSIS — R80.1 PERSISTENT PROTEINURIA: ICD-10-CM

## 2023-07-14 DIAGNOSIS — Z94.0 KIDNEY TRANSPLANT RECIPIENT: ICD-10-CM

## 2023-07-14 LAB
25(OH)D3 SERPL-MCNC: 35.3 NG/ML
ALBUMIN SERPL-MCNC: 3.9 G/DL (ref 3.4–5)
ANION GAP SERPL CALCULATED.3IONS-SCNC: 11 MMOL/L (ref 3–16)
BACTERIA URNS QL MICRO: ABNORMAL /HPF
BILIRUB UR QL STRIP.AUTO: NEGATIVE
BUN SERPL-MCNC: 31 MG/DL (ref 7–20)
C3 SERPL-MCNC: 152.2 MG/DL (ref 90–180)
C4 SERPL-MCNC: 41.6 MG/DL (ref 10–40)
CALCIUM SERPL-MCNC: 9.3 MG/DL (ref 8.3–10.6)
CHLORIDE SERPL-SCNC: 107 MMOL/L (ref 99–110)
CLARITY UR: CLEAR
CO2 SERPL-SCNC: 24 MMOL/L (ref 21–32)
COLOR UR: YELLOW
CREAT SERPL-MCNC: 1.5 MG/DL (ref 0.6–1.2)
CREAT UR-MCNC: 66.1 MG/DL (ref 28–259)
DEPRECATED RDW RBC AUTO: 13.6 % (ref 12.4–15.4)
EPI CELLS #/AREA URNS HPF: ABNORMAL /HPF (ref 0–5)
GFR SERPLBLD CREATININE-BSD FMLA CKD-EPI: 37 ML/MIN/{1.73_M2}
GLUCOSE SERPL-MCNC: 138 MG/DL (ref 70–99)
GLUCOSE UR STRIP.AUTO-MCNC: NEGATIVE MG/DL
HCT VFR BLD AUTO: 35.8 % (ref 36–48)
HGB BLD-MCNC: 11.3 G/DL (ref 12–16)
HGB UR QL STRIP.AUTO: ABNORMAL
KETONES UR STRIP.AUTO-MCNC: NEGATIVE MG/DL
LEUKOCYTE ESTERASE UR QL STRIP.AUTO: ABNORMAL
MCH RBC QN AUTO: 26.3 PG (ref 26–34)
MCHC RBC AUTO-ENTMCNC: 31.5 G/DL (ref 31–36)
MCV RBC AUTO: 83.3 FL (ref 80–100)
MICROALBUMIN UR DL<=1MG/L-MCNC: 292.6 MG/DL
MICROALBUMIN/CREAT UR: 4426.6 MG/G (ref 0–30)
NITRITE UR QL STRIP.AUTO: NEGATIVE
PH UR STRIP.AUTO: 5.5 [PH] (ref 5–8)
PHOSPHATE SERPL-MCNC: 4.2 MG/DL (ref 2.5–4.9)
PLATELET # BLD AUTO: 172 K/UL (ref 135–450)
PMV BLD AUTO: 8.9 FL (ref 5–10.5)
POTASSIUM SERPL-SCNC: 3.9 MMOL/L (ref 3.5–5.1)
PROT SERPL-MCNC: 5.7 G/DL (ref 6.4–8.2)
PROT UR STRIP.AUTO-MCNC: 300 MG/DL
PROT UR-MCNC: 0.38 G/DL
PROT UR-MCNC: 384 MG/DL
PROT/CREAT UR-RTO: 5.8 MG/DL
PTH-INTACT SERPL-MCNC: 134.4 PG/ML (ref 14–72)
RBC # BLD AUTO: 4.3 M/UL (ref 4–5.2)
RBC #/AREA URNS HPF: ABNORMAL /HPF (ref 0–4)
SODIUM SERPL-SCNC: 142 MMOL/L (ref 136–145)
SP GR UR STRIP.AUTO: 1.01 (ref 1–1.03)
TACROLIMUS BLOOD: 4.5 NG/ML (ref 5–20)
UA DIPSTICK W REFLEX MICRO PNL UR: YES
URN SPEC COLLECT METH UR: ABNORMAL
UROBILINOGEN UR STRIP-ACNC: 0.2 E.U./DL
WBC # BLD AUTO: 4.2 K/UL (ref 4–11)
WBC #/AREA URNS HPF: ABNORMAL /HPF (ref 0–5)

## 2023-07-14 PROCEDURE — 82043 UR ALBUMIN QUANTITATIVE: CPT

## 2023-07-14 PROCEDURE — 84156 ASSAY OF PROTEIN URINE: CPT

## 2023-07-14 PROCEDURE — 36415 COLL VENOUS BLD VENIPUNCTURE: CPT

## 2023-07-14 PROCEDURE — 86038 ANTINUCLEAR ANTIBODIES: CPT

## 2023-07-14 PROCEDURE — 85027 COMPLETE CBC AUTOMATED: CPT

## 2023-07-14 PROCEDURE — 86160 COMPLEMENT ANTIGEN: CPT

## 2023-07-14 PROCEDURE — 80069 RENAL FUNCTION PANEL: CPT

## 2023-07-14 PROCEDURE — 83883 ASSAY NEPHELOMETRY NOT SPEC: CPT

## 2023-07-14 PROCEDURE — 84165 PROTEIN E-PHORESIS SERUM: CPT

## 2023-07-14 PROCEDURE — 81001 URINALYSIS AUTO W/SCOPE: CPT

## 2023-07-14 PROCEDURE — 80197 ASSAY OF TACROLIMUS: CPT

## 2023-07-14 PROCEDURE — 82570 ASSAY OF URINE CREATININE: CPT

## 2023-07-14 PROCEDURE — 82306 VITAMIN D 25 HYDROXY: CPT

## 2023-07-14 PROCEDURE — 84155 ASSAY OF PROTEIN SERUM: CPT

## 2023-07-14 PROCEDURE — 84166 PROTEIN E-PHORESIS/URINE/CSF: CPT

## 2023-07-14 PROCEDURE — 83970 ASSAY OF PARATHORMONE: CPT

## 2023-07-15 LAB
ANA SER QL IA: NEGATIVE
KAPPA LC FREE SER-MCNC: 17.13 MG/L (ref 3.3–19.4)
KAPPA LC FREE/LAMBDA FREE SER: 0.9 {RATIO} (ref 0.26–1.65)
LAMBDA LC FREE SERPL-MCNC: 19.03 MG/L (ref 5.71–26.3)
RPT COMMENT: NORMAL

## 2023-07-17 LAB
ALBUMIN SERPL ELPH-MCNC: 3 G/DL (ref 3.1–4.9)
ALPHA1 GLOB SERPL ELPH-MCNC: 0.4 G/DL (ref 0.2–0.4)
ALPHA2 GLOB SERPL ELPH-MCNC: 1 G/DL (ref 0.4–1.1)
B-GLOBULIN SERPL ELPH-MCNC: 1 G/DL (ref 0.9–1.6)
GAMMA GLOB SERPL ELPH-MCNC: 0.3 G/DL (ref 0.6–1.8)
PROT PATTERN UR ELPH-IMP: NORMAL
PROT SERPL-MCNC: 5.7 G/DL (ref 6.4–8.2)
SPE/IFE INTERPRETATION: NORMAL

## 2023-09-16 ENCOUNTER — HOSPITAL ENCOUNTER (OUTPATIENT)
Age: 73
Discharge: HOME | End: 2023-09-16
Payer: MEDICARE

## 2023-09-16 DIAGNOSIS — E55.9 VITAMIN D DEFICIENCY: ICD-10-CM

## 2023-09-16 DIAGNOSIS — Z94.0 KIDNEY TRANSPLANT RECIPIENT: ICD-10-CM

## 2023-09-16 DIAGNOSIS — N25.81 SECONDARY HYPERPARATHYROIDISM OF RENAL ORIGIN (HCC): ICD-10-CM

## 2023-09-16 DIAGNOSIS — R80.1 PERSISTENT PROTEINURIA: ICD-10-CM

## 2023-09-16 DIAGNOSIS — R80.9 NEPHROTIC RANGE PROTEINURIA: ICD-10-CM

## 2023-09-16 DIAGNOSIS — N18.32 BENIGN HYPERTENSION WITH STAGE 3B CHRONIC KIDNEY DISEASE (HCC): ICD-10-CM

## 2023-09-16 DIAGNOSIS — I12.9 BENIGN HYPERTENSION WITH STAGE 3B CHRONIC KIDNEY DISEASE (HCC): ICD-10-CM

## 2023-09-16 LAB
25(OH)D3 SERPL-MCNC: 26 NG/ML
ALBUMIN SERPL-MCNC: 3.8 G/DL (ref 3.4–5)
ANION GAP SERPL CALCULATED.3IONS-SCNC: 10 MMOL/L (ref 3–16)
BACTERIA URNS QL MICRO: ABNORMAL /HPF
BILIRUB UR QL STRIP.AUTO: NEGATIVE
BUN SERPL-MCNC: 28 MG/DL (ref 7–20)
CALCIUM SERPL-MCNC: 9.3 MG/DL (ref 8.3–10.6)
CHLORIDE SERPL-SCNC: 109 MMOL/L (ref 99–110)
CLARITY UR: ABNORMAL
CO2 SERPL-SCNC: 25 MMOL/L (ref 21–32)
COLLECT DURATION TIME UR: 24 HR
COLOR UR: YELLOW
CREAT 24H UR-MRATE: 1 G/24HR (ref 0.6–1.5)
CREAT CL 24H UR+SERPL-VRATE: 46 ML/MIN (ref 88–128)
CREAT SERPL-MCNC: 1.4 MG/DL (ref 0.6–1.2)
CREAT SERPL-MCNC: 1.4 MG/DL (ref 0.6–1.2)
DEPRECATED RDW RBC AUTO: 14 % (ref 12.4–15.4)
EPI CELLS #/AREA URNS AUTO: 8 /HPF (ref 0–5)
GFR SERPLBLD CREATININE-BSD FMLA CKD-EPI: 40 ML/MIN/{1.73_M2}
GLUCOSE SERPL-MCNC: 162 MG/DL (ref 70–99)
GLUCOSE UR STRIP.AUTO-MCNC: 100 MG/DL
HCT VFR BLD AUTO: 34.2 % (ref 36–48)
HGB BLD-MCNC: 11.2 G/DL (ref 12–16)
HGB UR QL STRIP.AUTO: ABNORMAL
HYALINE CASTS #/AREA URNS AUTO: 2 /LPF (ref 0–8)
KETONES UR STRIP.AUTO-MCNC: NEGATIVE MG/DL
LEUKOCYTE ESTERASE UR QL STRIP.AUTO: ABNORMAL
MCH RBC QN AUTO: 27.3 PG (ref 26–34)
MCHC RBC AUTO-ENTMCNC: 32.7 G/DL (ref 31–36)
MCV RBC AUTO: 83.7 FL (ref 80–100)
MICROALBUMIN 24H UR-MRATE: 4194 MG/DAY (ref 0–30)
MICROALBUMIN UG/MIN 24H UR-MRATE: 2912.5 MCG/MIN (ref 0–20)
NITRITE UR QL STRIP.AUTO: NEGATIVE
PH UR STRIP.AUTO: 6 [PH] (ref 5–8)
PHOSPHATE SERPL-MCNC: 3.7 MG/DL (ref 2.5–4.9)
PLATELET # BLD AUTO: 212 K/UL (ref 135–450)
PMV BLD AUTO: 8.6 FL (ref 5–10.5)
POTASSIUM SERPL-SCNC: 4.5 MMOL/L (ref 3.5–5.1)
PROT 24H UR-MRATE: 5.58 G/24HR
PROT UR STRIP.AUTO-MCNC: >=1000 MG/DL
PTH-INTACT SERPL-MCNC: 141.4 PG/ML (ref 14–72)
RBC # BLD AUTO: 4.09 M/UL (ref 4–5.2)
RBC CLUMPS #/AREA URNS AUTO: 4 /HPF (ref 0–4)
SODIUM SERPL-SCNC: 144 MMOL/L (ref 136–145)
SP GR UR STRIP.AUTO: 1.02 (ref 1–1.03)
SPECIMEN VOL 24H UR: 1800 ML
UA COMPLETE W REFLEX CULTURE PNL UR: YES
UA DIPSTICK W REFLEX MICRO PNL UR: YES
URN SPEC COLLECT METH UR: ABNORMAL
UROBILINOGEN UR STRIP-ACNC: 0.2 E.U./DL
WBC # BLD AUTO: 4.5 K/UL (ref 4–11)
WBC #/AREA URNS AUTO: 192 /HPF (ref 0–5)

## 2023-09-16 PROCEDURE — 81001 URINALYSIS AUTO W/SCOPE: CPT

## 2023-09-16 PROCEDURE — 82575 CREATININE CLEARANCE TEST: CPT

## 2023-09-16 PROCEDURE — 84156 ASSAY OF PROTEIN URINE: CPT

## 2023-09-16 PROCEDURE — 80069 RENAL FUNCTION PANEL: CPT

## 2023-09-16 PROCEDURE — 84166 PROTEIN E-PHORESIS/URINE/CSF: CPT

## 2023-09-16 PROCEDURE — 87086 URINE CULTURE/COLONY COUNT: CPT

## 2023-09-16 PROCEDURE — 36415 COLL VENOUS BLD VENIPUNCTURE: CPT

## 2023-09-16 PROCEDURE — 82043 UR ALBUMIN QUANTITATIVE: CPT

## 2023-09-16 PROCEDURE — 83970 ASSAY OF PARATHORMONE: CPT

## 2023-09-16 PROCEDURE — 85027 COMPLETE CBC AUTOMATED: CPT

## 2023-09-16 PROCEDURE — 82306 VITAMIN D 25 HYDROXY: CPT

## 2023-09-17 LAB — BACTERIA UR CULT: NORMAL

## 2023-09-18 LAB — PROT PATTERN UR ELPH-IMP: NORMAL

## 2023-09-22 ENCOUNTER — HOSPITAL ENCOUNTER (OUTPATIENT)
Age: 73
Discharge: HOME | End: 2023-09-22
Payer: MEDICARE

## 2023-09-22 DIAGNOSIS — R80.9 NEPHROTIC RANGE PROTEINURIA: ICD-10-CM

## 2023-09-22 DIAGNOSIS — Z94.0 KIDNEY TRANSPLANT RECIPIENT: ICD-10-CM

## 2023-09-22 DIAGNOSIS — E55.9 VITAMIN D DEFICIENCY: ICD-10-CM

## 2023-09-22 LAB — TACROLIMUS BLOOD: 3.8 NG/ML (ref 5–20)

## 2023-09-22 PROCEDURE — 80197 ASSAY OF TACROLIMUS: CPT

## 2023-09-22 PROCEDURE — 36415 COLL VENOUS BLD VENIPUNCTURE: CPT

## 2023-12-07 ENCOUNTER — HOSPITAL ENCOUNTER (OUTPATIENT)
Age: 73
Discharge: HOME | End: 2023-12-07
Payer: MEDICARE

## 2023-12-07 DIAGNOSIS — E55.9 VITAMIN D DEFICIENCY: ICD-10-CM

## 2023-12-07 DIAGNOSIS — N18.32 BENIGN HYPERTENSION WITH STAGE 3B CHRONIC KIDNEY DISEASE (HCC): ICD-10-CM

## 2023-12-07 DIAGNOSIS — R80.9 NEPHROTIC RANGE PROTEINURIA: ICD-10-CM

## 2023-12-07 DIAGNOSIS — Z94.0 KIDNEY TRANSPLANT RECIPIENT: ICD-10-CM

## 2023-12-07 DIAGNOSIS — I12.9 BENIGN HYPERTENSION WITH STAGE 3B CHRONIC KIDNEY DISEASE (HCC): ICD-10-CM

## 2023-12-07 LAB
25(OH)D3 SERPL-MCNC: 23.6 NG/ML
ALBUMIN SERPL-MCNC: 3.4 G/DL (ref 3.4–5)
ANION GAP SERPL CALCULATED.3IONS-SCNC: 12 MMOL/L (ref 3–16)
BACTERIA URNS QL MICRO: ABNORMAL /HPF
BILIRUB UR QL STRIP.AUTO: NEGATIVE
BUN SERPL-MCNC: 30 MG/DL (ref 7–20)
CALCIUM SERPL-MCNC: 9 MG/DL (ref 8.3–10.6)
CHLORIDE SERPL-SCNC: 108 MMOL/L (ref 99–110)
CLARITY UR: ABNORMAL
CO2 SERPL-SCNC: 24 MMOL/L (ref 21–32)
COLOR UR: YELLOW
CREAT SERPL-MCNC: 1.9 MG/DL (ref 0.6–1.2)
CREAT UR-MCNC: 59 MG/DL (ref 28–259)
DEPRECATED RDW RBC AUTO: 13.7 % (ref 12.4–15.4)
EPI CELLS #/AREA URNS AUTO: 6 /HPF (ref 0–5)
GFR SERPLBLD CREATININE-BSD FMLA CKD-EPI: 28 ML/MIN/{1.73_M2}
GLUCOSE SERPL-MCNC: 155 MG/DL (ref 70–99)
GLUCOSE UR STRIP.AUTO-MCNC: NEGATIVE MG/DL
HCT VFR BLD AUTO: 34.9 % (ref 36–48)
HGB BLD-MCNC: 11.7 G/DL (ref 12–16)
HGB UR QL STRIP.AUTO: ABNORMAL
HYALINE CASTS #/AREA URNS AUTO: 1 /LPF (ref 0–8)
KETONES UR STRIP.AUTO-MCNC: NEGATIVE MG/DL
LEUKOCYTE ESTERASE UR QL STRIP.AUTO: ABNORMAL
MAGNESIUM SERPL-MCNC: 1.8 MG/DL (ref 1.8–2.4)
MCH RBC QN AUTO: 27.6 PG (ref 26–34)
MCHC RBC AUTO-ENTMCNC: 33.5 G/DL (ref 31–36)
MCV RBC AUTO: 82.4 FL (ref 80–100)
MICROALBUMIN UR DL<=1MG/L-MCNC: 314.1 MG/DL
MICROALBUMIN/CREAT UR: 5323.7 MG/G (ref 0–30)
NITRITE UR QL STRIP.AUTO: NEGATIVE
PH UR STRIP.AUTO: 6 [PH] (ref 5–8)
PHOSPHATE SERPL-MCNC: 4 MG/DL (ref 2.5–4.9)
PLATELET # BLD AUTO: 168 K/UL (ref 135–450)
PMV BLD AUTO: 8.7 FL (ref 5–10.5)
POTASSIUM SERPL-SCNC: 4 MMOL/L (ref 3.5–5.1)
PROT UR STRIP.AUTO-MCNC: 300 MG/DL
PROT UR-MCNC: 418 MG/DL
PROT/CREAT UR-RTO: 7.1 MG/DL
PTH-INTACT SERPL-MCNC: 193 PG/ML (ref 14–72)
RBC # BLD AUTO: 4.23 M/UL (ref 4–5.2)
RBC CLUMPS #/AREA URNS AUTO: 2 /HPF (ref 0–4)
SODIUM SERPL-SCNC: 144 MMOL/L (ref 136–145)
SP GR UR STRIP.AUTO: 1.01 (ref 1–1.03)
TACROLIMUS BLOOD: 2.9 NG/ML (ref 5–20)
UA DIPSTICK W REFLEX MICRO PNL UR: YES
URN SPEC COLLECT METH UR: ABNORMAL
UROBILINOGEN UR STRIP-ACNC: 0.2 E.U./DL
WBC # BLD AUTO: 3.6 K/UL (ref 4–11)
WBC #/AREA URNS AUTO: 101 /HPF (ref 0–5)

## 2023-12-07 PROCEDURE — 83970 ASSAY OF PARATHORMONE: CPT

## 2023-12-07 PROCEDURE — 82043 UR ALBUMIN QUANTITATIVE: CPT

## 2023-12-07 PROCEDURE — 80069 RENAL FUNCTION PANEL: CPT

## 2023-12-07 PROCEDURE — 84156 ASSAY OF PROTEIN URINE: CPT

## 2023-12-07 PROCEDURE — 85027 COMPLETE CBC AUTOMATED: CPT

## 2023-12-07 PROCEDURE — 36415 COLL VENOUS BLD VENIPUNCTURE: CPT

## 2023-12-07 PROCEDURE — 80197 ASSAY OF TACROLIMUS: CPT

## 2023-12-07 PROCEDURE — 82306 VITAMIN D 25 HYDROXY: CPT

## 2023-12-07 PROCEDURE — 82570 ASSAY OF URINE CREATININE: CPT

## 2023-12-07 PROCEDURE — 83735 ASSAY OF MAGNESIUM: CPT

## 2023-12-07 PROCEDURE — 81001 URINALYSIS AUTO W/SCOPE: CPT

## 2023-12-11 ENCOUNTER — HOSPITAL ENCOUNTER (OUTPATIENT)
Age: 73
Discharge: HOME | End: 2023-12-11
Payer: MEDICARE

## 2023-12-11 DIAGNOSIS — R73.03 PRE-DIABETES: ICD-10-CM

## 2023-12-11 DIAGNOSIS — N18.9 ACUTE KIDNEY INJURY SUPERIMPOSED ON CHRONIC KIDNEY DISEASE (HCC): ICD-10-CM

## 2023-12-11 DIAGNOSIS — N18.32 BENIGN HYPERTENSION WITH STAGE 3B CHRONIC KIDNEY DISEASE (HCC): ICD-10-CM

## 2023-12-11 DIAGNOSIS — R30.0 DYSURIA: ICD-10-CM

## 2023-12-11 DIAGNOSIS — N17.9 ACUTE KIDNEY INJURY SUPERIMPOSED ON CHRONIC KIDNEY DISEASE (HCC): ICD-10-CM

## 2023-12-11 DIAGNOSIS — Z79.621 ENCOUNTER FOR MONITORING TACROLIMUS THERAPY: ICD-10-CM

## 2023-12-11 DIAGNOSIS — E55.9 VITAMIN D DEFICIENCY: ICD-10-CM

## 2023-12-11 DIAGNOSIS — R80.9 NEPHROTIC RANGE PROTEINURIA: ICD-10-CM

## 2023-12-11 DIAGNOSIS — Z51.81 ENCOUNTER FOR MONITORING TACROLIMUS THERAPY: ICD-10-CM

## 2023-12-11 DIAGNOSIS — I12.9 BENIGN HYPERTENSION WITH STAGE 3B CHRONIC KIDNEY DISEASE (HCC): ICD-10-CM

## 2023-12-11 DIAGNOSIS — Z94.0 KIDNEY TRANSPLANT RECIPIENT: ICD-10-CM

## 2023-12-11 LAB
25(OH)D3 SERPL-MCNC: 28.3 NG/ML
ALBUMIN SERPL-MCNC: 4.1 G/DL (ref 3.4–5)
ANION GAP SERPL CALCULATED.3IONS-SCNC: 13 MMOL/L (ref 3–16)
BACTERIA URNS QL MICRO: ABNORMAL /HPF
BILIRUB UR QL STRIP.AUTO: NEGATIVE
BUN SERPL-MCNC: 30 MG/DL (ref 7–20)
CALCIUM SERPL-MCNC: 9 MG/DL (ref 8.3–10.6)
CHLORIDE SERPL-SCNC: 106 MMOL/L (ref 99–110)
CLARITY UR: CLEAR
CO2 SERPL-SCNC: 24 MMOL/L (ref 21–32)
COLOR UR: YELLOW
CREAT SERPL-MCNC: 1.6 MG/DL (ref 0.6–1.2)
CREAT UR-MCNC: 24.9 MG/DL (ref 28–259)
DEPRECATED RDW RBC AUTO: 14.1 % (ref 12.4–15.4)
EPI CELLS #/AREA URNS AUTO: 4 /HPF (ref 0–5)
GFR SERPLBLD CREATININE-BSD FMLA CKD-EPI: 34 ML/MIN/{1.73_M2}
GLUCOSE SERPL-MCNC: 140 MG/DL (ref 70–99)
GLUCOSE UR STRIP.AUTO-MCNC: NEGATIVE MG/DL
HCT VFR BLD AUTO: 36 % (ref 36–48)
HGB BLD-MCNC: 12.1 G/DL (ref 12–16)
HGB UR QL STRIP.AUTO: ABNORMAL
HYALINE CASTS #/AREA URNS AUTO: 2 /LPF (ref 0–8)
KETONES UR STRIP.AUTO-MCNC: NEGATIVE MG/DL
LEUKOCYTE ESTERASE UR QL STRIP.AUTO: ABNORMAL
MAGNESIUM SERPL-MCNC: 1.9 MG/DL (ref 1.8–2.4)
MCH RBC QN AUTO: 28 PG (ref 26–34)
MCHC RBC AUTO-ENTMCNC: 33.7 G/DL (ref 31–36)
MCV RBC AUTO: 82.9 FL (ref 80–100)
NITRITE UR QL STRIP.AUTO: NEGATIVE
PH UR STRIP.AUTO: 5.5 [PH] (ref 5–8)
PHOSPHATE SERPL-MCNC: 4.3 MG/DL (ref 2.5–4.9)
PLATELET # BLD AUTO: 199 K/UL (ref 135–450)
PMV BLD AUTO: 8.4 FL (ref 5–10.5)
POTASSIUM SERPL-SCNC: 3.8 MMOL/L (ref 3.5–5.1)
PROT UR STRIP.AUTO-MCNC: 300 MG/DL
PROT UR-MCNC: 225 MG/DL
PROT/CREAT UR-RTO: 9 MG/DL
PTH-INTACT SERPL-MCNC: 180.8 PG/ML (ref 14–72)
RBC # BLD AUTO: 4.34 M/UL (ref 4–5.2)
RBC CLUMPS #/AREA URNS AUTO: 1 /HPF (ref 0–4)
SODIUM SERPL-SCNC: 143 MMOL/L (ref 136–145)
SP GR UR STRIP.AUTO: 1.01 (ref 1–1.03)
TACROLIMUS BLOOD: 8.4 NG/ML (ref 5–20)
UA COMPLETE W REFLEX CULTURE PNL UR: YES
UA DIPSTICK W REFLEX MICRO PNL UR: YES
URN SPEC COLLECT METH UR: ABNORMAL
UROBILINOGEN UR STRIP-ACNC: 0.2 E.U./DL
WBC # BLD AUTO: 5.1 K/UL (ref 4–11)
WBC #/AREA URNS AUTO: 38 /HPF (ref 0–5)

## 2023-12-11 PROCEDURE — 83735 ASSAY OF MAGNESIUM: CPT

## 2023-12-11 PROCEDURE — 84156 ASSAY OF PROTEIN URINE: CPT

## 2023-12-11 PROCEDURE — 83970 ASSAY OF PARATHORMONE: CPT

## 2023-12-11 PROCEDURE — 85027 COMPLETE CBC AUTOMATED: CPT

## 2023-12-11 PROCEDURE — 82570 ASSAY OF URINE CREATININE: CPT

## 2023-12-11 PROCEDURE — 82306 VITAMIN D 25 HYDROXY: CPT

## 2023-12-11 PROCEDURE — 80197 ASSAY OF TACROLIMUS: CPT

## 2023-12-11 PROCEDURE — 87086 URINE CULTURE/COLONY COUNT: CPT

## 2023-12-11 PROCEDURE — 80069 RENAL FUNCTION PANEL: CPT

## 2023-12-11 PROCEDURE — 81001 URINALYSIS AUTO W/SCOPE: CPT

## 2023-12-12 LAB — BACTERIA UR CULT: NORMAL

## 2024-01-26 ENCOUNTER — HOSPITAL ENCOUNTER (OUTPATIENT)
Age: 74
Discharge: HOME | End: 2024-01-26
Payer: MEDICARE

## 2024-01-26 LAB
ALBUMIN SERPL-MCNC: 3.7 G/DL (ref 3.4–5)
ANION GAP SERPL CALCULATED.3IONS-SCNC: 11 MMOL/L (ref 3–16)
BACTERIA URNS QL MICRO: ABNORMAL /HPF
BILIRUB UR QL STRIP.AUTO: NEGATIVE
BUN SERPL-MCNC: 49 MG/DL (ref 7–20)
CALCIUM SERPL-MCNC: 8.7 MG/DL (ref 8.3–10.6)
CHLORIDE SERPL-SCNC: 109 MMOL/L (ref 99–110)
CLARITY UR: CLEAR
CO2 SERPL-SCNC: 22 MMOL/L (ref 21–32)
COLOR UR: YELLOW
CREAT SERPL-MCNC: 1.7 MG/DL (ref 0.6–1.2)
CREAT UR-MCNC: 43.9 MG/DL (ref 28–259)
DEPRECATED RDW RBC AUTO: 13.7 % (ref 12.4–15.4)
EPI CELLS #/AREA URNS AUTO: 2 /HPF (ref 0–5)
EST. AVERAGE GLUCOSE BLD GHB EST-MCNC: 125.5 MG/DL
GFR SERPLBLD CREATININE-BSD FMLA CKD-EPI: 32 ML/MIN/{1.73_M2}
GLUCOSE SERPL-MCNC: 142 MG/DL (ref 70–99)
GLUCOSE UR STRIP.AUTO-MCNC: 100 MG/DL
HBA1C MFR BLD: 6 %
HCT VFR BLD AUTO: 34.2 % (ref 36–48)
HGB BLD-MCNC: 11.5 G/DL (ref 12–16)
HGB UR QL STRIP.AUTO: ABNORMAL
HYALINE CASTS #/AREA URNS AUTO: 3 /LPF (ref 0–8)
KETONES UR STRIP.AUTO-MCNC: NEGATIVE MG/DL
LEUKOCYTE ESTERASE UR QL STRIP.AUTO: ABNORMAL
MCH RBC QN AUTO: 27.9 PG (ref 26–34)
MCHC RBC AUTO-ENTMCNC: 33.5 G/DL (ref 31–36)
MCV RBC AUTO: 83.4 FL (ref 80–100)
NITRITE UR QL STRIP.AUTO: NEGATIVE
PH UR STRIP.AUTO: 6 [PH] (ref 5–8)
PHOSPHATE SERPL-MCNC: 4.6 MG/DL (ref 2.5–4.9)
PLATELET # BLD AUTO: 190 K/UL (ref 135–450)
PMV BLD AUTO: 8.7 FL (ref 5–10.5)
POTASSIUM SERPL-SCNC: 4.6 MMOL/L (ref 3.5–5.1)
PROT UR STRIP.AUTO-MCNC: >=1000 MG/DL
PROT UR-MCNC: 775 MG/DL
PROT/CREAT UR-RTO: 17.7 MG/DL
RBC # BLD AUTO: 4.1 M/UL (ref 4–5.2)
RBC CLUMPS #/AREA URNS AUTO: 3 /HPF (ref 0–4)
SODIUM SERPL-SCNC: 142 MMOL/L (ref 136–145)
SP GR UR STRIP.AUTO: 1.01 (ref 1–1.03)
UA DIPSTICK W REFLEX MICRO PNL UR: YES
URN SPEC COLLECT METH UR: ABNORMAL
UROBILINOGEN UR STRIP-ACNC: 0.2 E.U./DL
WBC # BLD AUTO: 5.4 K/UL (ref 4–11)
WBC #/AREA URNS AUTO: 5 /HPF (ref 0–5)

## 2024-01-26 PROCEDURE — 36415 COLL VENOUS BLD VENIPUNCTURE: CPT

## 2024-01-26 PROCEDURE — 81001 URINALYSIS AUTO W/SCOPE: CPT

## 2024-01-26 PROCEDURE — 82570 ASSAY OF URINE CREATININE: CPT

## 2024-01-26 PROCEDURE — 80069 RENAL FUNCTION PANEL: CPT

## 2024-01-26 PROCEDURE — 83036 HEMOGLOBIN GLYCOSYLATED A1C: CPT

## 2024-01-26 PROCEDURE — 84156 ASSAY OF PROTEIN URINE: CPT

## 2024-01-26 PROCEDURE — 85027 COMPLETE CBC AUTOMATED: CPT

## 2024-01-26 PROCEDURE — 80197 ASSAY OF TACROLIMUS: CPT

## 2024-01-27 LAB — TACROLIMUS BLOOD: 3.4 NG/ML (ref 5–20)

## 2024-03-21 NOTE — ADDENDUM NOTE
Addended by: Dionne Padron on: 6/27/2022 02:14 PM     Modules accepted: Orders
Addended byKamilah Wiggins on: 6/24/2022 11:45 AM     Modules accepted: Orders
English

## 2024-04-27 ENCOUNTER — HOSPITAL ENCOUNTER (OUTPATIENT)
Age: 74
Discharge: HOME | End: 2024-04-27
Payer: MEDICARE

## 2024-04-27 DIAGNOSIS — E66.9 OBESITY (BMI 30-39.9): ICD-10-CM

## 2024-04-27 DIAGNOSIS — N18.32 BENIGN HYPERTENSION WITH STAGE 3B CHRONIC KIDNEY DISEASE (HCC): ICD-10-CM

## 2024-04-27 DIAGNOSIS — R80.9 NEPHROTIC RANGE PROTEINURIA: ICD-10-CM

## 2024-04-27 DIAGNOSIS — Z94.0 KIDNEY TRANSPLANT RECIPIENT: ICD-10-CM

## 2024-04-27 DIAGNOSIS — I12.9 BENIGN HYPERTENSION WITH STAGE 3B CHRONIC KIDNEY DISEASE (HCC): ICD-10-CM

## 2024-04-27 DIAGNOSIS — Z51.81 ENCOUNTER FOR MONITORING TACROLIMUS THERAPY: ICD-10-CM

## 2024-04-27 DIAGNOSIS — Z79.621 ENCOUNTER FOR MONITORING TACROLIMUS THERAPY: ICD-10-CM

## 2024-04-27 DIAGNOSIS — R73.03 PRE-DIABETES: ICD-10-CM

## 2024-04-27 LAB
ALBUMIN SERPL-MCNC: 3.7 G/DL (ref 3.4–5)
ANION GAP SERPL CALCULATED.3IONS-SCNC: 12 MMOL/L (ref 3–16)
BUN SERPL-MCNC: 49 MG/DL (ref 7–20)
CALCIUM SERPL-MCNC: 9.2 MG/DL (ref 8.3–10.6)
CHLORIDE SERPL-SCNC: 111 MMOL/L (ref 99–110)
CO2 SERPL-SCNC: 20 MMOL/L (ref 21–32)
COLLECT DURATION TIME UR: 24 HOURS
CREAT SERPL-MCNC: 1.8 MG/DL (ref 0.6–1.2)
DEPRECATED RDW RBC AUTO: 14 % (ref 12.4–15.4)
GFR SERPLBLD CREATININE-BSD FMLA CKD-EPI: 29 ML/MIN/{1.73_M2}
GLUCOSE SERPL-MCNC: 156 MG/DL (ref 70–99)
HCT VFR BLD AUTO: 29.9 % (ref 36–48)
HGB BLD-MCNC: 9.6 G/DL (ref 12–16)
MAGNESIUM SERPL-MCNC: 1.8 MG/DL (ref 1.8–2.4)
MCH RBC QN AUTO: 26.8 PG (ref 26–34)
MCHC RBC AUTO-ENTMCNC: 32.2 G/DL (ref 31–36)
MCV RBC AUTO: 83.1 FL (ref 80–100)
MICROALBUMIN 24H UR-MRATE: 2325.6 MG/DAY (ref 0–30)
MICROALBUMIN UG/MIN 24H UR-MRATE: 1615 MCG/MIN (ref 0–20)
PHOSPHATE SERPL-MCNC: 4.2 MG/DL (ref 2.5–4.9)
PLATELET # BLD AUTO: 190 K/UL (ref 135–450)
PMV BLD AUTO: 8.6 FL (ref 5–10.5)
POTASSIUM SERPL-SCNC: 4.4 MMOL/L (ref 3.5–5.1)
PROT 24H UR-MRATE: 3.06 G/24HR
PTH-INTACT SERPL-MCNC: 184.6 PG/ML (ref 14–72)
RBC # BLD AUTO: 3.6 M/UL (ref 4–5.2)
SODIUM SERPL-SCNC: 143 MMOL/L (ref 136–145)
SPECIMEN VOL 24H UR: 1900 ML
WBC # BLD AUTO: 2.8 K/UL (ref 4–11)

## 2024-04-27 PROCEDURE — 83735 ASSAY OF MAGNESIUM: CPT

## 2024-04-27 PROCEDURE — 84166 PROTEIN E-PHORESIS/URINE/CSF: CPT

## 2024-04-27 PROCEDURE — 83970 ASSAY OF PARATHORMONE: CPT

## 2024-04-27 PROCEDURE — 80069 RENAL FUNCTION PANEL: CPT

## 2024-04-27 PROCEDURE — 84156 ASSAY OF PROTEIN URINE: CPT

## 2024-04-27 PROCEDURE — 80197 ASSAY OF TACROLIMUS: CPT

## 2024-04-27 PROCEDURE — 85027 COMPLETE CBC AUTOMATED: CPT

## 2024-04-27 PROCEDURE — 82306 VITAMIN D 25 HYDROXY: CPT

## 2024-04-27 PROCEDURE — 82575 CREATININE CLEARANCE TEST: CPT

## 2024-04-27 PROCEDURE — 82043 UR ALBUMIN QUANTITATIVE: CPT

## 2024-04-27 PROCEDURE — 36415 COLL VENOUS BLD VENIPUNCTURE: CPT

## 2024-04-28 LAB
25(OH)D3 SERPL-MCNC: 22.9 NG/ML
COLLECT DURATION TIME UR: 24 HR
CREAT 24H UR-MRATE: 0.9 G/24HR (ref 0.6–1.5)
CREAT CL 24H UR+SERPL-VRATE: 33 ML/MIN (ref 88–128)
CREAT SERPL-MCNC: 1.8 MG/DL (ref 0.6–1.2)
TACROLIMUS BLOOD: 2.1 NG/ML (ref 5–20)

## 2024-04-29 LAB — PROT PATTERN UR ELPH-IMP: NORMAL

## 2024-06-08 ENCOUNTER — HOSPITAL ENCOUNTER (OUTPATIENT)
Age: 74
Discharge: HOME | End: 2024-06-08
Payer: MEDICARE

## 2024-06-08 DIAGNOSIS — Z51.81 ENCOUNTER FOR MONITORING TACROLIMUS THERAPY: ICD-10-CM

## 2024-06-08 DIAGNOSIS — N18.32 BENIGN HYPERTENSION WITH STAGE 3B CHRONIC KIDNEY DISEASE (HCC): ICD-10-CM

## 2024-06-08 DIAGNOSIS — D63.1 ANEMIA IN CHRONIC KIDNEY DISEASE, UNSPECIFIED CKD STAGE: ICD-10-CM

## 2024-06-08 DIAGNOSIS — E55.9 VITAMIN D DEFICIENCY: ICD-10-CM

## 2024-06-08 DIAGNOSIS — Z94.0 KIDNEY TRANSPLANT RECIPIENT: ICD-10-CM

## 2024-06-08 DIAGNOSIS — E66.9 OBESITY (BMI 30-39.9): ICD-10-CM

## 2024-06-08 DIAGNOSIS — I12.9 BENIGN HYPERTENSION WITH STAGE 3B CHRONIC KIDNEY DISEASE (HCC): ICD-10-CM

## 2024-06-08 DIAGNOSIS — R80.9 NEPHROTIC RANGE PROTEINURIA: ICD-10-CM

## 2024-06-08 DIAGNOSIS — N25.81 SECONDARY HYPERPARATHYROIDISM OF RENAL ORIGIN (HCC): ICD-10-CM

## 2024-06-08 DIAGNOSIS — Z79.621 ENCOUNTER FOR MONITORING TACROLIMUS THERAPY: ICD-10-CM

## 2024-06-08 DIAGNOSIS — N18.9 ANEMIA IN CHRONIC KIDNEY DISEASE, UNSPECIFIED CKD STAGE: ICD-10-CM

## 2024-06-08 LAB
ALBUMIN SERPL-MCNC: 3.8 G/DL (ref 3.4–5)
ANION GAP SERPL CALCULATED.3IONS-SCNC: 14 MMOL/L (ref 3–16)
BACTERIA URNS QL MICRO: ABNORMAL /HPF
BILIRUB UR QL STRIP.AUTO: NEGATIVE
BUN SERPL-MCNC: 52 MG/DL (ref 7–20)
CALCIUM SERPL-MCNC: 9.2 MG/DL (ref 8.3–10.6)
CHLORIDE SERPL-SCNC: 109 MMOL/L (ref 99–110)
CLARITY UR: CLEAR
CO2 SERPL-SCNC: 18 MMOL/L (ref 21–32)
COLOR UR: YELLOW
CREAT SERPL-MCNC: 2.2 MG/DL (ref 0.6–1.2)
CREAT UR-MCNC: 59.4 MG/DL (ref 28–259)
DEPRECATED RDW RBC AUTO: 14.1 % (ref 12.4–15.4)
EPI CELLS #/AREA URNS AUTO: 6 /HPF (ref 0–5)
FERRITIN SERPL IA-MCNC: 502.3 NG/ML (ref 15–150)
GFR SERPLBLD CREATININE-BSD FMLA CKD-EPI: 23 ML/MIN/{1.73_M2}
GLUCOSE SERPL-MCNC: 144 MG/DL (ref 70–99)
GLUCOSE UR STRIP.AUTO-MCNC: NEGATIVE MG/DL
HCT VFR BLD AUTO: 28 % (ref 36–48)
HGB BLD-MCNC: 9.2 G/DL (ref 12–16)
HGB UR QL STRIP.AUTO: NEGATIVE
HYALINE CASTS #/AREA URNS AUTO: 1 /LPF (ref 0–8)
IRON SATN MFR SERPL: 39 % (ref 15–50)
IRON SERPL-MCNC: 94 UG/DL (ref 37–145)
KETONES UR STRIP.AUTO-MCNC: NEGATIVE MG/DL
LEUKOCYTE ESTERASE UR QL STRIP.AUTO: ABNORMAL
MCH RBC QN AUTO: 27.2 PG (ref 26–34)
MCHC RBC AUTO-ENTMCNC: 33 G/DL (ref 31–36)
MCV RBC AUTO: 82.4 FL (ref 80–100)
MICROALBUMIN UR DL<=1MG/L-MCNC: 113.9 MG/DL
MICROALBUMIN/CREAT UR: 1917.5 MG/G (ref 0–30)
NITRITE UR QL STRIP.AUTO: NEGATIVE
PH UR STRIP.AUTO: 5.5 [PH] (ref 5–8)
PHOSPHATE SERPL-MCNC: 4.8 MG/DL (ref 2.5–4.9)
PLATELET # BLD AUTO: 216 K/UL (ref 135–450)
PMV BLD AUTO: 8.4 FL (ref 5–10.5)
POTASSIUM SERPL-SCNC: 5 MMOL/L (ref 3.5–5.1)
PROT UR STRIP.AUTO-MCNC: 300 MG/DL
PROT UR-MCNC: 152 MG/DL
PROT/CREAT UR-RTO: 2.6 MG/DL
PTH-INTACT SERPL-MCNC: 161 PG/ML (ref 14–72)
RBC # BLD AUTO: 3.4 M/UL (ref 4–5.2)
RBC CLUMPS #/AREA URNS AUTO: 1 /HPF (ref 0–4)
SODIUM SERPL-SCNC: 141 MMOL/L (ref 136–145)
SP GR UR STRIP.AUTO: 1.01 (ref 1–1.03)
TIBC SERPL-MCNC: 241 UG/DL (ref 260–445)
UA DIPSTICK W REFLEX MICRO PNL UR: YES
URN SPEC COLLECT METH UR: ABNORMAL
UROBILINOGEN UR STRIP-ACNC: 0.2 E.U./DL
WBC # BLD AUTO: 3.4 K/UL (ref 4–11)
WBC #/AREA URNS AUTO: 43 /HPF (ref 0–5)

## 2024-06-08 PROCEDURE — 83036 HEMOGLOBIN GLYCOSYLATED A1C: CPT

## 2024-06-08 PROCEDURE — 81001 URINALYSIS AUTO W/SCOPE: CPT

## 2024-06-08 PROCEDURE — 85027 COMPLETE CBC AUTOMATED: CPT

## 2024-06-08 PROCEDURE — 84156 ASSAY OF PROTEIN URINE: CPT

## 2024-06-08 PROCEDURE — 82570 ASSAY OF URINE CREATININE: CPT

## 2024-06-08 PROCEDURE — 36415 COLL VENOUS BLD VENIPUNCTURE: CPT

## 2024-06-08 PROCEDURE — 80197 ASSAY OF TACROLIMUS: CPT

## 2024-06-08 PROCEDURE — 82043 UR ALBUMIN QUANTITATIVE: CPT

## 2024-06-08 PROCEDURE — 80069 RENAL FUNCTION PANEL: CPT

## 2024-06-08 PROCEDURE — 83970 ASSAY OF PARATHORMONE: CPT

## 2024-06-08 PROCEDURE — 83540 ASSAY OF IRON: CPT

## 2024-06-08 PROCEDURE — 82728 ASSAY OF FERRITIN: CPT

## 2024-06-08 PROCEDURE — 83550 IRON BINDING TEST: CPT

## 2024-06-09 LAB
25(OH)D3 SERPL-MCNC: 35.9 NG/ML
EST. AVERAGE GLUCOSE BLD GHB EST-MCNC: 131.2 MG/DL
HBA1C MFR BLD: 6.2 %

## 2024-06-10 LAB — TACROLIMUS BLOOD: 8 NG/ML (ref 5–20)

## 2024-06-24 ENCOUNTER — HOSPITAL ENCOUNTER (OUTPATIENT)
Age: 74
Discharge: HOME | End: 2024-06-24
Payer: MEDICARE

## 2024-06-24 DIAGNOSIS — N18.9 ACUTE KIDNEY INJURY SUPERIMPOSED ON CHRONIC KIDNEY DISEASE (HCC): ICD-10-CM

## 2024-06-24 DIAGNOSIS — N39.0 URINARY TRACT INFECTION WITHOUT HEMATURIA, SITE UNSPECIFIED: ICD-10-CM

## 2024-06-24 DIAGNOSIS — N18.32 BENIGN HYPERTENSION WITH STAGE 3B CHRONIC KIDNEY DISEASE (HCC): ICD-10-CM

## 2024-06-24 DIAGNOSIS — Z94.0 KIDNEY TRANSPLANT RECIPIENT: ICD-10-CM

## 2024-06-24 DIAGNOSIS — I12.9 BENIGN HYPERTENSION WITH STAGE 3B CHRONIC KIDNEY DISEASE (HCC): ICD-10-CM

## 2024-06-24 DIAGNOSIS — Z51.81 ENCOUNTER FOR MONITORING TACROLIMUS THERAPY: ICD-10-CM

## 2024-06-24 DIAGNOSIS — R25.2 MUSCLE CRAMP: ICD-10-CM

## 2024-06-24 DIAGNOSIS — N17.9 ACUTE KIDNEY INJURY SUPERIMPOSED ON CHRONIC KIDNEY DISEASE (HCC): ICD-10-CM

## 2024-06-24 DIAGNOSIS — Z79.621 ENCOUNTER FOR MONITORING TACROLIMUS THERAPY: ICD-10-CM

## 2024-06-24 LAB
ALBUMIN SERPL-MCNC: 4.1 G/DL (ref 3.4–5)
ANION GAP SERPL CALCULATED.3IONS-SCNC: 13 MMOL/L (ref 3–16)
BACTERIA URNS QL MICRO: ABNORMAL /HPF
BILIRUB UR QL STRIP.AUTO: NEGATIVE
BUN SERPL-MCNC: 55 MG/DL (ref 7–20)
CALCIUM SERPL-MCNC: 9.2 MG/DL (ref 8.3–10.6)
CHLORIDE SERPL-SCNC: 110 MMOL/L (ref 99–110)
CLARITY UR: ABNORMAL
CO2 SERPL-SCNC: 18 MMOL/L (ref 21–32)
COLOR UR: YELLOW
CREAT SERPL-MCNC: 2.1 MG/DL (ref 0.6–1.2)
DEPRECATED RDW RBC AUTO: 13.9 % (ref 12.4–15.4)
EPI CELLS #/AREA URNS AUTO: 4 /HPF (ref 0–5)
GFR SERPLBLD CREATININE-BSD FMLA CKD-EPI: 24 ML/MIN/{1.73_M2}
GLUCOSE SERPL-MCNC: 142 MG/DL (ref 70–99)
GLUCOSE UR STRIP.AUTO-MCNC: NEGATIVE MG/DL
HCT VFR BLD AUTO: 27.7 % (ref 36–48)
HGB BLD-MCNC: 8.9 G/DL (ref 12–16)
HGB UR QL STRIP.AUTO: NEGATIVE
HYALINE CASTS #/AREA URNS AUTO: 4 /LPF (ref 0–8)
KETONES UR STRIP.AUTO-MCNC: NEGATIVE MG/DL
LEUKOCYTE ESTERASE UR QL STRIP.AUTO: ABNORMAL
MAGNESIUM SERPL-MCNC: 1.8 MG/DL (ref 1.8–2.4)
MCH RBC QN AUTO: 26.5 PG (ref 26–34)
MCHC RBC AUTO-ENTMCNC: 32 G/DL (ref 31–36)
MCV RBC AUTO: 82.6 FL (ref 80–100)
NITRITE UR QL STRIP.AUTO: NEGATIVE
PH UR STRIP.AUTO: 5 [PH] (ref 5–8)
PHOSPHATE SERPL-MCNC: 3.9 MG/DL (ref 2.5–4.9)
PLATELET # BLD AUTO: 181 K/UL (ref 135–450)
PMV BLD AUTO: 8.5 FL (ref 5–10.5)
POTASSIUM SERPL-SCNC: 4.2 MMOL/L (ref 3.5–5.1)
PROT UR STRIP.AUTO-MCNC: 100 MG/DL
RBC # BLD AUTO: 3.36 M/UL (ref 4–5.2)
RBC CLUMPS #/AREA URNS AUTO: 0 /HPF (ref 0–4)
SODIUM SERPL-SCNC: 141 MMOL/L (ref 136–145)
SP GR UR STRIP.AUTO: 1.01 (ref 1–1.03)
UA COMPLETE W REFLEX CULTURE PNL UR: YES
UA DIPSTICK W REFLEX MICRO PNL UR: YES
URN SPEC COLLECT METH UR: ABNORMAL
UROBILINOGEN UR STRIP-ACNC: 0.2 E.U./DL
WBC # BLD AUTO: 3.8 K/UL (ref 4–11)
WBC #/AREA URNS AUTO: 23 /HPF (ref 0–5)

## 2024-06-24 PROCEDURE — 80069 RENAL FUNCTION PANEL: CPT

## 2024-06-24 PROCEDURE — 83735 ASSAY OF MAGNESIUM: CPT

## 2024-06-24 PROCEDURE — 87086 URINE CULTURE/COLONY COUNT: CPT

## 2024-06-24 PROCEDURE — 85027 COMPLETE CBC AUTOMATED: CPT

## 2024-06-24 PROCEDURE — 36415 COLL VENOUS BLD VENIPUNCTURE: CPT

## 2024-06-24 PROCEDURE — 80197 ASSAY OF TACROLIMUS: CPT

## 2024-06-24 PROCEDURE — 81001 URINALYSIS AUTO W/SCOPE: CPT

## 2024-06-25 LAB
BACTERIA UR CULT: NORMAL
TACROLIMUS BLOOD: 8.8 NG/ML (ref 5–20)

## 2024-07-26 ENCOUNTER — HOSPITAL ENCOUNTER (OUTPATIENT)
Age: 74
Discharge: HOME | End: 2024-07-26
Payer: MEDICARE

## 2024-07-26 DIAGNOSIS — Z94.0 KIDNEY TRANSPLANT RECIPIENT: ICD-10-CM

## 2024-07-26 DIAGNOSIS — R30.0 DYSURIA: ICD-10-CM

## 2024-07-26 DIAGNOSIS — N18.9 ACUTE KIDNEY INJURY SUPERIMPOSED ON CHRONIC KIDNEY DISEASE (HCC): ICD-10-CM

## 2024-07-26 DIAGNOSIS — Z79.621 ENCOUNTER FOR MONITORING TACROLIMUS THERAPY: ICD-10-CM

## 2024-07-26 DIAGNOSIS — I12.9 BENIGN HYPERTENSION WITH STAGE 3B CHRONIC KIDNEY DISEASE (HCC): ICD-10-CM

## 2024-07-26 DIAGNOSIS — N18.32 BENIGN HYPERTENSION WITH STAGE 3B CHRONIC KIDNEY DISEASE (HCC): ICD-10-CM

## 2024-07-26 DIAGNOSIS — R80.9 NEPHROTIC RANGE PROTEINURIA: ICD-10-CM

## 2024-07-26 DIAGNOSIS — E55.9 VITAMIN D DEFICIENCY: ICD-10-CM

## 2024-07-26 DIAGNOSIS — N17.9 ACUTE KIDNEY INJURY SUPERIMPOSED ON CHRONIC KIDNEY DISEASE (HCC): ICD-10-CM

## 2024-07-26 DIAGNOSIS — E66.9 OBESITY (BMI 30-39.9): ICD-10-CM

## 2024-07-26 DIAGNOSIS — R73.03 PRE-DIABETES: ICD-10-CM

## 2024-07-26 DIAGNOSIS — Z51.81 ENCOUNTER FOR MONITORING TACROLIMUS THERAPY: ICD-10-CM

## 2024-07-26 LAB
ALBUMIN SERPL-MCNC: 4.2 G/DL (ref 3.4–5)
ANION GAP SERPL CALCULATED.3IONS-SCNC: 15 MMOL/L (ref 3–16)
BACTERIA URNS QL MICRO: ABNORMAL /HPF
BILIRUB UR QL STRIP.AUTO: NEGATIVE
BUN SERPL-MCNC: 69 MG/DL (ref 7–20)
CALCIUM SERPL-MCNC: 9.1 MG/DL (ref 8.3–10.6)
CHLORIDE SERPL-SCNC: 109 MMOL/L (ref 99–110)
CLARITY UR: ABNORMAL
CO2 SERPL-SCNC: 15 MMOL/L (ref 21–32)
COLOR UR: YELLOW
CREAT SERPL-MCNC: 2.7 MG/DL (ref 0.6–1.2)
DEPRECATED RDW RBC AUTO: 14.5 % (ref 12.4–15.4)
EPI CELLS #/AREA URNS AUTO: 5 /HPF (ref 0–5)
GFR SERPLBLD CREATININE-BSD FMLA CKD-EPI: 18 ML/MIN/{1.73_M2}
GLUCOSE SERPL-MCNC: 129 MG/DL (ref 70–99)
GLUCOSE UR STRIP.AUTO-MCNC: NEGATIVE MG/DL
HCT VFR BLD AUTO: 26.9 % (ref 36–48)
HGB BLD-MCNC: 8.5 G/DL (ref 12–16)
HGB UR QL STRIP.AUTO: NEGATIVE
HYALINE CASTS #/AREA URNS AUTO: 2 /LPF (ref 0–8)
KETONES UR STRIP.AUTO-MCNC: NEGATIVE MG/DL
LEUKOCYTE ESTERASE UR QL STRIP.AUTO: ABNORMAL
MAGNESIUM SERPL-MCNC: 2.2 MG/DL (ref 1.8–2.4)
MCH RBC QN AUTO: 26.5 PG (ref 26–34)
MCHC RBC AUTO-ENTMCNC: 31.6 G/DL (ref 31–36)
MCV RBC AUTO: 83.7 FL (ref 80–100)
NITRITE UR QL STRIP.AUTO: NEGATIVE
PH UR STRIP.AUTO: 5 [PH] (ref 5–8)
PHOSPHATE SERPL-MCNC: 4.5 MG/DL (ref 2.5–4.9)
PLATELET # BLD AUTO: 166 K/UL (ref 135–450)
PMV BLD AUTO: 9 FL (ref 5–10.5)
POTASSIUM SERPL-SCNC: 5.1 MMOL/L (ref 3.5–5.1)
PROT UR STRIP.AUTO-MCNC: 100 MG/DL
RBC # BLD AUTO: 3.21 M/UL (ref 4–5.2)
RBC CLUMPS #/AREA URNS AUTO: 1 /HPF (ref 0–4)
SODIUM SERPL-SCNC: 139 MMOL/L (ref 136–145)
SP GR UR STRIP.AUTO: 1.01 (ref 1–1.03)
UA COMPLETE W REFLEX CULTURE PNL UR: YES
UA DIPSTICK W REFLEX MICRO PNL UR: YES
URN SPEC COLLECT METH UR: ABNORMAL
UROBILINOGEN UR STRIP-ACNC: 0.2 E.U./DL
WBC # BLD AUTO: 5.3 K/UL (ref 4–11)
WBC #/AREA URNS AUTO: 93 /HPF (ref 0–5)

## 2024-07-26 PROCEDURE — 83735 ASSAY OF MAGNESIUM: CPT

## 2024-07-26 PROCEDURE — 85027 COMPLETE CBC AUTOMATED: CPT

## 2024-07-26 PROCEDURE — 36415 COLL VENOUS BLD VENIPUNCTURE: CPT

## 2024-07-26 PROCEDURE — 80069 RENAL FUNCTION PANEL: CPT

## 2024-07-26 PROCEDURE — 87086 URINE CULTURE/COLONY COUNT: CPT

## 2024-07-26 PROCEDURE — 81001 URINALYSIS AUTO W/SCOPE: CPT

## 2024-07-26 PROCEDURE — 80197 ASSAY OF TACROLIMUS: CPT

## 2024-07-27 LAB
BACTERIA UR CULT: NORMAL
TACROLIMUS BLOOD: 6.2 NG/ML (ref 5–20)

## 2024-08-12 ENCOUNTER — APPOINTMENT (OUTPATIENT)
Dept: CT IMAGING | Age: 74
DRG: 987 | End: 2024-08-12
Payer: MEDICARE

## 2024-08-12 ENCOUNTER — HOSPITAL ENCOUNTER (INPATIENT)
Age: 74
LOS: 9 days | Discharge: HOME | DRG: 987 | End: 2024-08-21
Attending: INTERNAL MEDICINE | Admitting: INTERNAL MEDICINE
Payer: MEDICARE

## 2024-08-12 DIAGNOSIS — R19.7 DIARRHEA, UNSPECIFIED TYPE: ICD-10-CM

## 2024-08-12 DIAGNOSIS — N18.9 ACUTE RENAL FAILURE SUPERIMPOSED ON CHRONIC KIDNEY DISEASE, UNSPECIFIED ACUTE RENAL FAILURE TYPE, UNSPECIFIED CKD STAGE (HCC): Primary | ICD-10-CM

## 2024-08-12 DIAGNOSIS — B25.9 CYTOMEGALOVIRUS (CMV) VIREMIA (HCC): ICD-10-CM

## 2024-08-12 DIAGNOSIS — R11.0 NAUSEA: ICD-10-CM

## 2024-08-12 DIAGNOSIS — R10.13 ABDOMINAL PAIN, EPIGASTRIC: ICD-10-CM

## 2024-08-12 DIAGNOSIS — N17.9 ACUTE RENAL FAILURE SUPERIMPOSED ON CHRONIC KIDNEY DISEASE, UNSPECIFIED ACUTE RENAL FAILURE TYPE, UNSPECIFIED CKD STAGE (HCC): Primary | ICD-10-CM

## 2024-08-12 LAB
ALBUMIN SERPL-MCNC: 4.2 G/DL (ref 3.4–5)
ALBUMIN/GLOB SERPL: 1.4 {RATIO} (ref 1.1–2.2)
ALP SERPL-CCNC: 76 U/L (ref 40–129)
ALT SERPL-CCNC: 10 U/L (ref 10–40)
ANION GAP SERPL CALCULATED.3IONS-SCNC: 23 MMOL/L (ref 3–16)
AST SERPL-CCNC: 21 U/L (ref 15–37)
BACTERIA URNS QL MICRO: ABNORMAL /HPF
BASOPHILS # BLD: 0 K/UL (ref 0–0.2)
BASOPHILS NFR BLD: 0.2 %
BILIRUB SERPL-MCNC: 0.3 MG/DL (ref 0–1)
BILIRUB UR QL STRIP.AUTO: NEGATIVE
BUN SERPL-MCNC: 93 MG/DL (ref 7–20)
C DIFF TOX A+B STL QL IA: NORMAL
CALCIUM SERPL-MCNC: 9.8 MG/DL (ref 8.3–10.6)
CHLORIDE SERPL-SCNC: 109 MMOL/L (ref 99–110)
CLARITY UR: ABNORMAL
CO2 SERPL-SCNC: 9 MMOL/L (ref 21–32)
COLOR UR: YELLOW
CREAT SERPL-MCNC: 3.4 MG/DL (ref 0.6–1.2)
DEPRECATED RDW RBC AUTO: 14.9 % (ref 12.4–15.4)
EKG ATRIAL RATE: 114 BPM
EKG DIAGNOSIS: NORMAL
EKG P AXIS: 41 DEGREES
EKG P-R INTERVAL: 196 MS
EKG Q-T INTERVAL: 310 MS
EKG QRS DURATION: 86 MS
EKG QTC CALCULATION (BAZETT): 427 MS
EKG R AXIS: -20 DEGREES
EKG T AXIS: 66 DEGREES
EKG VENTRICULAR RATE: 114 BPM
EOSINOPHIL # BLD: 0 K/UL (ref 0–0.6)
EOSINOPHIL NFR BLD: 0.9 %
EPI CELLS #/AREA URNS AUTO: 13 /HPF (ref 0–5)
FLUAV RNA RESP QL NAA+PROBE: NOT DETECTED
FLUBV RNA RESP QL NAA+PROBE: NOT DETECTED
GFR SERPLBLD CREATININE-BSD FMLA CKD-EPI: 14 ML/MIN/{1.73_M2}
GLUCOSE SERPL-MCNC: 194 MG/DL (ref 70–99)
GLUCOSE UR STRIP.AUTO-MCNC: NEGATIVE MG/DL
HCT VFR BLD AUTO: 28.7 % (ref 36–48)
HGB BLD-MCNC: 9 G/DL (ref 12–16)
HGB UR QL STRIP.AUTO: ABNORMAL
HYALINE CASTS #/AREA URNS AUTO: 7 /LPF (ref 0–8)
KETONES UR STRIP.AUTO-MCNC: ABNORMAL MG/DL
LEUKOCYTE ESTERASE UR QL STRIP.AUTO: ABNORMAL
LIPASE SERPL-CCNC: 54 U/L (ref 13–60)
LYMPHOCYTES # BLD: 0.3 K/UL (ref 1–5.1)
LYMPHOCYTES NFR BLD: 9.8 %
MAGNESIUM SERPL-MCNC: 2.2 MG/DL (ref 1.8–2.4)
MCH RBC QN AUTO: 26.1 PG (ref 26–34)
MCHC RBC AUTO-ENTMCNC: 31.4 G/DL (ref 31–36)
MCV RBC AUTO: 83 FL (ref 80–100)
MONOCYTES # BLD: 0.5 K/UL (ref 0–1.3)
MONOCYTES NFR BLD: 14.4 %
NEUTROPHILS # BLD: 2.5 K/UL (ref 1.7–7.7)
NEUTROPHILS NFR BLD: 74.7 %
NITRITE UR QL STRIP.AUTO: NEGATIVE
PH UR STRIP.AUTO: 5 [PH] (ref 5–8)
PLATELET # BLD AUTO: 163 K/UL (ref 135–450)
PMV BLD AUTO: 8.9 FL (ref 5–10.5)
POTASSIUM SERPL-SCNC: 5 MMOL/L (ref 3.5–5.1)
PROT SERPL-MCNC: 7.2 G/DL (ref 6.4–8.2)
PROT UR STRIP.AUTO-MCNC: >=1000 MG/DL
RBC # BLD AUTO: 3.46 M/UL (ref 4–5.2)
RBC CLUMPS #/AREA URNS AUTO: 2 /HPF (ref 0–4)
SARS-COV-2 RNA RESP QL NAA+PROBE: NOT DETECTED
SODIUM SERPL-SCNC: 141 MMOL/L (ref 136–145)
SP GR UR STRIP.AUTO: 1.01 (ref 1–1.03)
UA COMPLETE W REFLEX CULTURE PNL UR: YES
UA DIPSTICK W REFLEX MICRO PNL UR: YES
URN SPEC COLLECT METH UR: ABNORMAL
UROBILINOGEN UR STRIP-ACNC: 0.2 E.U./DL
WBC # BLD AUTO: 3.4 K/UL (ref 4–11)
WBC #/AREA URNS AUTO: 112 /HPF (ref 0–5)

## 2024-08-12 PROCEDURE — 6360000002 HC RX W HCPCS: Performed by: INTERNAL MEDICINE

## 2024-08-12 PROCEDURE — 6370000000 HC RX 637 (ALT 250 FOR IP): Performed by: INTERNAL MEDICINE

## 2024-08-12 PROCEDURE — 96375 TX/PRO/DX INJ NEW DRUG ADDON: CPT

## 2024-08-12 PROCEDURE — 80053 COMPREHEN METABOLIC PANEL: CPT

## 2024-08-12 PROCEDURE — 74176 CT ABD & PELVIS W/O CONTRAST: CPT

## 2024-08-12 PROCEDURE — 87449 NOS EACH ORGANISM AG IA: CPT

## 2024-08-12 PROCEDURE — 83735 ASSAY OF MAGNESIUM: CPT

## 2024-08-12 PROCEDURE — 1200000000 HC SEMI PRIVATE

## 2024-08-12 PROCEDURE — 2580000003 HC RX 258: Performed by: INTERNAL MEDICINE

## 2024-08-12 PROCEDURE — 83690 ASSAY OF LIPASE: CPT

## 2024-08-12 PROCEDURE — 81001 URINALYSIS AUTO W/SCOPE: CPT

## 2024-08-12 PROCEDURE — 85025 COMPLETE CBC W/AUTO DIFF WBC: CPT

## 2024-08-12 PROCEDURE — 99285 EMERGENCY DEPT VISIT HI MDM: CPT

## 2024-08-12 PROCEDURE — 93005 ELECTROCARDIOGRAM TRACING: CPT | Performed by: PHYSICIAN ASSISTANT

## 2024-08-12 PROCEDURE — 87506 IADNA-DNA/RNA PROBE TQ 6-11: CPT

## 2024-08-12 PROCEDURE — 93010 ELECTROCARDIOGRAM REPORT: CPT | Performed by: INTERNAL MEDICINE

## 2024-08-12 PROCEDURE — 6360000002 HC RX W HCPCS: Performed by: PHYSICIAN ASSISTANT

## 2024-08-12 PROCEDURE — 2500000003 HC RX 250 WO HCPCS: Performed by: INTERNAL MEDICINE

## 2024-08-12 PROCEDURE — 87086 URINE CULTURE/COLONY COUNT: CPT

## 2024-08-12 PROCEDURE — 87324 CLOSTRIDIUM AG IA: CPT

## 2024-08-12 PROCEDURE — 2580000003 HC RX 258: Performed by: PHYSICIAN ASSISTANT

## 2024-08-12 PROCEDURE — 96365 THER/PROPH/DIAG IV INF INIT: CPT

## 2024-08-12 PROCEDURE — 87636 SARSCOV2 & INF A&B AMP PRB: CPT

## 2024-08-12 RX ORDER — POLYETHYLENE GLYCOL 3350 17 G/17G
17 POWDER, FOR SOLUTION ORAL DAILY PRN
Status: DISCONTINUED | OUTPATIENT
Start: 2024-08-12 | End: 2024-08-21 | Stop reason: HOSPADM

## 2024-08-12 RX ORDER — ACETAMINOPHEN 650 MG/1
650 SUPPOSITORY RECTAL EVERY 6 HOURS PRN
Status: DISCONTINUED | OUTPATIENT
Start: 2024-08-12 | End: 2024-08-21 | Stop reason: HOSPADM

## 2024-08-12 RX ORDER — ONDANSETRON 2 MG/ML
4 INJECTION INTRAMUSCULAR; INTRAVENOUS ONCE
Status: COMPLETED | OUTPATIENT
Start: 2024-08-12 | End: 2024-08-12

## 2024-08-12 RX ORDER — SODIUM CHLORIDE 0.9 % (FLUSH) 0.9 %
5-40 SYRINGE (ML) INJECTION PRN
Status: DISCONTINUED | OUTPATIENT
Start: 2024-08-12 | End: 2024-08-21 | Stop reason: HOSPADM

## 2024-08-12 RX ORDER — SODIUM CHLORIDE 0.9 % (FLUSH) 0.9 %
5-40 SYRINGE (ML) INJECTION EVERY 12 HOURS SCHEDULED
Status: DISCONTINUED | OUTPATIENT
Start: 2024-08-12 | End: 2024-08-21 | Stop reason: HOSPADM

## 2024-08-12 RX ORDER — HYDRALAZINE HYDROCHLORIDE 25 MG/1
25 TABLET, FILM COATED ORAL 2 TIMES DAILY
COMMUNITY

## 2024-08-12 RX ORDER — ENOXAPARIN SODIUM 100 MG/ML
30 INJECTION SUBCUTANEOUS DAILY
Status: DISCONTINUED | OUTPATIENT
Start: 2024-08-12 | End: 2024-08-16

## 2024-08-12 RX ORDER — ASPIRIN 81 MG/1
81 TABLET ORAL DAILY
Status: DISCONTINUED | OUTPATIENT
Start: 2024-08-12 | End: 2024-08-21 | Stop reason: HOSPADM

## 2024-08-12 RX ORDER — SODIUM CHLORIDE 9 MG/ML
INJECTION, SOLUTION INTRAVENOUS PRN
Status: DISCONTINUED | OUTPATIENT
Start: 2024-08-12 | End: 2024-08-21 | Stop reason: HOSPADM

## 2024-08-12 RX ORDER — ACETAMINOPHEN 325 MG/1
650 TABLET ORAL EVERY 6 HOURS PRN
Status: DISCONTINUED | OUTPATIENT
Start: 2024-08-12 | End: 2024-08-21 | Stop reason: HOSPADM

## 2024-08-12 RX ORDER — LEVOTHYROXINE SODIUM 112 UG/1
112 TABLET ORAL DAILY
Status: DISCONTINUED | OUTPATIENT
Start: 2024-08-12 | End: 2024-08-21 | Stop reason: HOSPADM

## 2024-08-12 RX ORDER — ATORVASTATIN CALCIUM 20 MG/1
20 TABLET, FILM COATED ORAL NIGHTLY
Status: DISCONTINUED | OUTPATIENT
Start: 2024-08-12 | End: 2024-08-21 | Stop reason: HOSPADM

## 2024-08-12 RX ORDER — SUCRALFATE 1 G/1
1 TABLET ORAL EVERY 6 HOURS SCHEDULED
Status: DISCONTINUED | OUTPATIENT
Start: 2024-08-12 | End: 2024-08-21 | Stop reason: HOSPADM

## 2024-08-12 RX ORDER — ALLOPURINOL 100 MG/1
100 TABLET ORAL DAILY
Status: DISCONTINUED | OUTPATIENT
Start: 2024-08-12 | End: 2024-08-21 | Stop reason: HOSPADM

## 2024-08-12 RX ORDER — METOPROLOL TARTRATE 50 MG/1
200 TABLET, FILM COATED ORAL 2 TIMES DAILY
Status: DISCONTINUED | OUTPATIENT
Start: 2024-08-12 | End: 2024-08-21 | Stop reason: HOSPADM

## 2024-08-12 RX ORDER — CIPROFLOXACIN 2 MG/ML
400 INJECTION, SOLUTION INTRAVENOUS EVERY 24 HOURS
Status: DISCONTINUED | OUTPATIENT
Start: 2024-08-12 | End: 2024-08-16

## 2024-08-12 RX ORDER — 0.9 % SODIUM CHLORIDE 0.9 %
1000 INTRAVENOUS SOLUTION INTRAVENOUS ONCE
Status: COMPLETED | OUTPATIENT
Start: 2024-08-12 | End: 2024-08-12

## 2024-08-12 RX ORDER — FAMOTIDINE 20 MG/1
20 TABLET, FILM COATED ORAL DAILY
Status: DISCONTINUED | OUTPATIENT
Start: 2024-08-12 | End: 2024-08-21 | Stop reason: HOSPADM

## 2024-08-12 RX ADMIN — SUCRALFATE 1 G: 1 TABLET ORAL at 23:12

## 2024-08-12 RX ADMIN — SODIUM CHLORIDE, PRESERVATIVE FREE 10 ML: 5 INJECTION INTRAVENOUS at 20:02

## 2024-08-12 RX ADMIN — ALLOPURINOL 100 MG: 100 TABLET ORAL at 18:17

## 2024-08-12 RX ADMIN — LEVOTHYROXINE SODIUM 112 MCG: 0.11 TABLET ORAL at 18:17

## 2024-08-12 RX ADMIN — ASPIRIN 81 MG: 81 TABLET, COATED ORAL at 18:17

## 2024-08-12 RX ADMIN — SODIUM BICARBONATE: 84 INJECTION, SOLUTION INTRAVENOUS at 14:08

## 2024-08-12 RX ADMIN — ONDANSETRON 4 MG: 2 INJECTION INTRAMUSCULAR; INTRAVENOUS at 12:43

## 2024-08-12 RX ADMIN — SUCRALFATE 1 G: 1 TABLET ORAL at 18:17

## 2024-08-12 RX ADMIN — SODIUM BICARBONATE: 84 INJECTION, SOLUTION INTRAVENOUS at 14:03

## 2024-08-12 RX ADMIN — ATORVASTATIN CALCIUM 20 MG: 20 TABLET, FILM COATED ORAL at 20:02

## 2024-08-12 RX ADMIN — SODIUM CHLORIDE 1000 ML: 9 INJECTION, SOLUTION INTRAVENOUS at 12:52

## 2024-08-12 RX ADMIN — CIPROFLOXACIN 400 MG: 400 INJECTION, SOLUTION INTRAVENOUS at 18:23

## 2024-08-12 RX ADMIN — METOPROLOL TARTRATE 200 MG: 50 TABLET, FILM COATED ORAL at 20:02

## 2024-08-12 RX ADMIN — FAMOTIDINE 20 MG: 20 TABLET ORAL at 18:17

## 2024-08-12 ASSESSMENT — PAIN - FUNCTIONAL ASSESSMENT: PAIN_FUNCTIONAL_ASSESSMENT: NONE - DENIES PAIN

## 2024-08-12 ASSESSMENT — LIFESTYLE VARIABLES
HOW OFTEN DO YOU HAVE A DRINK CONTAINING ALCOHOL: NEVER
HOW MANY STANDARD DRINKS CONTAINING ALCOHOL DO YOU HAVE ON A TYPICAL DAY: PATIENT DOES NOT DRINK

## 2024-08-12 NOTE — ACP (ADVANCE CARE PLANNING)
Advanced Care Planning Note.    Purpose of Encounter: Advanced care planning in light of hospitalization  Parties In Attendance: Patient,  sister  Decisional Capacity: Yes  Subjective: Patient  understand that this conversation is to address long term care goal  Objective: Admitted to hospital with diarrhea and IMMANUEL  Goals of Care Determination: Patient would pursue CPR and Intubation if required. Unsure about long term ventilation/tracheostomy, would want family to make the decision at the time if required  Code Status: full code  Time spent on Advanced care Plannin minutes  Advanced Care Planning Documents: documented patient's wishes, would like Sister Franklyn to make medical decisions if unable to make decisions    Ra Boothe MD  2024 3:36 PM

## 2024-08-12 NOTE — PROGRESS NOTES
4 Eyes Skin Assessment     NAME:  Jessica Reyes  YOB: 1950  MEDICAL RECORD NUMBER:  [id number]    The patient is being assessed for  Admission    I agree that at least one RN has performed a thorough Head to Toe Skin Assessment on the patient. ALL assessment sites listed below have been assessed.      Areas assessed by both nurses:    Head, Face, Ears, Shoulders, Back, Chest, Arms, Elbows, Hands, Sacrum. Buttock, Coccyx, Ischium, Legs. Feet and Heels, and Under Medical Devices         Does the Patient have a Wound? No noted wound(s)       Chester Prevention initiated by RN: Yes  Wound Care Orders initiated by RN: No    Pressure Injury (Stage 3,4, Unstageable, DTI, NWPT, and Complex wounds) if present, place Wound referral order by RN under : No    New Ostomies, if present place, Ostomy referral order under : No     Nurse 1 eSignature: Electronically signed by Kaci Fisher RN on 8/12/24 at 5:55 PM EDT    **SHARE this note so that the co-signing nurse can place an eSignature**    Nurse 2 eSignature: {Esignature:[id number]}

## 2024-08-12 NOTE — ED NOTES
Pt ambulated to restroom with minimal assistance, gait stead, updated on plan of care family at bedside

## 2024-08-12 NOTE — H&P
HOSPITALISTS HISTORY AND PHYSICAL    8/12/2024 3:34 PM    Patient Information:  JESSICA PERAZA is a 73 y.o. female [id number]  PCP:  Mehran Stein MD (Tel: [phone] )    Chief complaint:    Chief Complaint   Patient presents with    Diarrhea     Diarrhea x 1 week.        History of Present Illness:  Jessica Peraza is a 73 y.o. female who presents for hospital 1 week history of diarrhea has been having liquid stools 4 times a day with some epigastric discomfort no vomiting but is nauseous not been eating much food denies any recent antibiotics chills fever sweats sick contacts cough or travel.  Does not smoke or drink does have a history of renal transplant 11 years ago      REVIEW OF SYSTEMS:   Constitutional: Negative for fever,chills or night sweats  ENT: Negative for rhinorrhea, epistaxis, hoarseness, sore throat.  Respiratory: Negative for shortness of breath,wheezing    Genitourinary: Negative for polyuria, dysuria   Hematologic/Lymphatic: Negative for bleeding tendency, easy bruising  Musculoskeletal: Negative for myalgias and arthralgias  Neurologic: Negative for confusion,dysarthria.  Skin: Negative for itching,rash  Psychiatric: Negative for depression,anxiety, agitation.  Endocrine: Negative for polydipsia,polyuria,heat /cold intolerance.    Past Medical History:   has a past medical history of Clostridium difficile diarrhea, Esophageal reflux, Hyperlipidemia, Hypertension, Nephropathy, membranous, Nephrotic syndrome, and Thyroid disease.     Past Surgical History:   has a past surgical history that includes Abdomen surgery; Tonsillectomy; Knee arthroscopy; Elbow arthroscopy; Kidney transplant (2013); and Appendectomy.     Medications:  No current facility-administered medications on file prior to encounter.     Current Outpatient Medications on File Prior to Encounter   Medication Sig Dispense Refill     investigation and treatment of the above medically necessary diagnoses.    Please note that some part of this chart was generated using Dragon dictation software. Although every effort was made to ensure the accuracy of this automated transcription, some errors in transcription may have occurred inadvertently. If you may need any clarification, please do not hesitate to contact me through EPIC.       Ra Boothe MD    8/12/2024 3:34 PM

## 2024-08-12 NOTE — CONSULTS
[facility]  Consultation Note    Reason for Consult:  IMMANUEL on CKD 3b,s/p renal transplant  Requesting Physician:  Dr. KUMAR Waldron PA    CHIEF COMPLAINT:  Persistent diarrhea    History obtained from records and patient.    HISTORY OF PRESENT ILLNESS:                Jessica Reyes  is 73 y.o. y.o. female with significant past medical history of  LRRT on 1/19/20212, ESRD due to HTN,  HLD, Membranous Nephropathy, who presents with diarrhea for about 5-7 days associated with decreased po intake.  Baseline crea in the low 2's.  Crea was 2.1 on 6/24/24 and 2.7 on 7/26/24.  In 12/2023 crea was 1.6-1.9.  's.  Outpatient IS includes MMF 750mg bid and Tacro 3mg bid.  On Lasix and Valsartan as an outpatient.  Also on statin and Allopurinol.  No new skin rashes.  No change in urine output.     Past Medical History:     has a past medical history of Clostridium difficile diarrhea, Esophageal reflux, Hyperlipidemia, Hypertension, Nephropathy, membranous, Nephrotic syndrome, and Thyroid disease.   Past Surgical History:     has a past surgical history that includes Abdomen surgery; Tonsillectomy; Knee arthroscopy; Elbow arthroscopy; Kidney transplant (2013); and Appendectomy.   Current Medications:    Current Facility-Administered Medications: sodium bicarbonate 150 mEq in dextrose 5 % 1,000 mL infusion, , IntraVENous, Continuous  aspirin EC tablet 81 mg, 81 mg, Oral, Daily  allopurinol (ZYLOPRIM) tablet 100 mg, 100 mg, Oral, Daily  famotidine (PEPCID) tablet 20 mg, 20 mg, Oral, Daily  levothyroxine (SYNTHROID) tablet 112 mcg, 112 mcg, Oral, Daily  metoprolol tartrate (LOPRESSOR) tablet 200 mg, 200 mg, Oral, BID  atorvastatin (LIPITOR) tablet 20 mg, 20 mg, Oral, Nightly  sucralfate (CARAFATE) tablet 1 g, 1 g, Oral, 4 times per day  sodium chloride flush 0.9 % injection 5-40 mL, 5-40 mL, IntraVENous, 2 times per day  sodium chloride flush 0.9 % injection 5-40 mL, 5-40 mL, IntraVENous, PRN  0.9  % sodium chloride infusion, , IntraVENous, PRN  enoxaparin Sodium (LOVENOX) injection 30 mg, 30 mg, SubCUTAneous, Daily  polyethylene glycol (GLYCOLAX) packet 17 g, 17 g, Oral, Daily PRN  acetaminophen (TYLENOL) tablet 650 mg, 650 mg, Oral, Q6H PRN **OR** acetaminophen (TYLENOL) suppository 650 mg, 650 mg, Rectal, Q6H PRN  ciprofloxacin (CIPRO) IVPB 400 mg, 400 mg, IntraVENous, Q24H  Allergies:    Cefuroxime axetil   Social History:     reports that she has never smoked. She has never used smokeless tobacco. She reports that she does not drink alcohol and does not use drugs.   Family History:   family history includes Diabetes in her brother, father, mother, and sister; Heart Disease in her brother, father, and mother; High Blood Pressure in her father and mother; High Cholesterol in her sister; Hypertension in her sister.     REVIEW OF SYSTEMS:    System review was done , pertinent positives are mentioned in the HPI    Positive fatigue  No chest pain nor palpitations  No SOB, coughing nor hemoptysis  No abdominal pain.  Episodes of decreased po intake, nausea and diarrhea.   No fever/chills  No leg swelling  No change in urine output nor gross hematuria    PHYSICAL EXAM:    Vitals:  /83   Pulse (!) 103   Temp 98 °F (36.7 °C) (Oral)   Resp 18   Ht 1.626 m (5' 4\")   Wt 74.8 kg (165 lb)   SpO2 94%   BMI 28.32 kg/m²       CONSTITUTIONAL:  awake, alert, cooperative, no apparent distress, and appears stated age  EYES:  Lids and lashes normal, pupils equal, round   NECK:  Supple, symmetrical, trachea midline, no adenopathy, thyroid symmetric, not enlarged and no tenderness, skin normal  HEMATOLOGIC/LYMPHATICS:  no cervical lymphadenopathy  LUNGS:  No increased work of breathing, good air exchange, clear to auscultation bilaterally, no crackles or wheezing  CARDIOVASCULAR:  Normal apical impulse, regular rate and rhythm, normal S1 and S2  ABDOMEN:  Normal bowel sounds, soft, non-distended, non-tender, no

## 2024-08-12 NOTE — ED NOTES
Pharmacy Home Medication Reconciliation Note    A medication reconciliation has been completed for Jessica Reyes 1950    [facility] [place]  Information provided by: patient, fill history    The patient's home medication list is as follows:  No current facility-administered medications on file prior to encounter.     Current Outpatient Medications on File Prior to Encounter   Medication Sig Dispense Refill    hydrALAZINE (APRESOLINE) 25 MG tablet Take 1 tablet by mouth in the morning and at bedtime      mycophenolate (CELLCEPT) 250 MG capsule TAKE 3 CAPSULES BY MOUTH TWICE A  capsule 3    furosemide (LASIX) 20 MG tablet Take 2 tablets by mouth daily 180 tablet 1    tacrolimus (PROGRAF) 1 MG capsule Take 1 capsule by mouth 2 times daily 120 capsule 3    vitamin D (ERGOCALCIFEROL) 1.25 MG (45817 UT) CAPS capsule Take 1 capsule by mouth once a week 12 capsule 1    diclofenac sodium (VOLTAREN) 1 % GEL Apply topically as needed      famotidine (PEPCID) 20 MG tablet Take 1 tablet by mouth 2 times daily      valsartan (DIOVAN) 320 MG tablet Take 1 tablet by mouth daily 90 tablet 3    metoprolol (LOPRESSOR) 100 MG tablet 2 tablets in the morning and at bedtime      simvastatin (ZOCOR) 20 MG tablet Take 1 tablet by mouth nightly      levothyroxine (SYNTHROID) 112 MCG tablet Take 1 tablet by mouth Daily      sucralfate (CARAFATE) 1 GM tablet Take 1 tablet 4 times a day by oral route as needed.      aspirin 81 MG EC tablet Take 1 tablet by mouth daily      allopurinol (ZYLOPRIM) 100 MG tablet Take 1 tablet by mouth daily         Of note, patient has a history of renal transplant, takes tacrolimus 1mg BID and MMF 750mg BID.    Timing of last doses updated.    Thank you,  Shelli Ferrer, PharmD, BCCCP

## 2024-08-12 NOTE — ED PROVIDER NOTES
[facility] EMERGENCY DEPARTMENT  EMERGENCY DEPARTMENT ENCOUNTER        Pt Name: Jessica Reyes  MRN: [id number]  Birthdate 1950  Date of evaluation: 2024  Provider: Dewayne Waldron PA-C  PCP: Mehran Stein MD  Note Started: 12:09 PM EDT 24      LILI. I have evaluated this patient.        CHIEF COMPLAINT       Chief Complaint   Patient presents with    Diarrhea     Diarrhea x 1 week.        HISTORY OF PRESENT ILLNESS: 1 or more Elements     History from : Patient    Limitations to history : None    Jessica Reyes is a 73 y.o. female who presents to the emergency department complaining of diarrhea for 1 week.  She does report some nausea and occasional epigastric discomfort.  She describes the diarrhea as watery.  She denies recent travel, surgery, hospitalization, fever, chills, sick exposures, recent antibiotic use, ingestion of seafood,  or spicy food prior to onset of symptoms.  Today, she feels weak and fatigued.  She thinks that she is dehydrated.  Patient does have history of chronic kidney disease and sees Dr. Duffy, nephrologist.    Nursing Notes were all reviewed and agreed with or any disagreements were addressed in the HPI.    REVIEW OF SYSTEMS :      Review of Systems   Constitutional:  Positive for fatigue. Negative for chills and fever.   HENT: Negative.     Eyes:  Negative for visual disturbance.   Respiratory:  Negative for shortness of breath.    Cardiovascular:  Negative for chest pain.   Gastrointestinal:  Positive for abdominal pain, diarrhea and nausea. Negative for abdominal distention, blood in stool, constipation and vomiting.   Genitourinary: Negative.    Musculoskeletal:  Negative for back pain, neck pain and neck stiffness.   Skin:  Negative for color change, pallor, rash and wound.   Neurological:  Positive for weakness. Negative for dizziness, tremors, seizures, syncope, facial asymmetry, speech difficulty, light-headedness,  Admitting Team hospitalist paged for admission 1403 and Consultant I spoke with Dr Roque with nephrology at 1314. He agrees with plan for rehydration.     Social Determinants : None    Records Reviewed : Outpatient Notes nephrology visit 7/30/24    CC/HPI Summary, DDx, ED Course, and Reassessment: This presents to the emergency department complaining of intermittent epigastric abdominal discomfort with nausea and diarrhea for 1 week.  She has not given us a stool sample yet.  She does have acute on chronic renal insufficiency.  Her tachycardia is improving with IV fluid resuscitation.  CT abdomen and pelvis is stable.  Patient understands and agrees with plan for admission.  I did consult with nephrology.    Disposition Considerations (include 1 Tests not done, Shared Decision Making, Pt Expectation of Test or Tx.): admit         I am the Primary Clinician of Record.    FINAL IMPRESSION      1. Acute renal failure superimposed on chronic kidney disease, unspecified acute renal failure type, unspecified CKD stage (HCC)    2. Diarrhea, unspecified type    3. Nausea    4. Abdominal pain, epigastric          DISPOSITION/PLAN     DISPOSITION Decision To Admit 08/12/2024 02:05:45 PM      PATIENT REFERRED TO:  No follow-up provider specified.    DISCHARGE MEDICATIONS:  New Prescriptions    No medications on file       DISCONTINUED MEDICATIONS:  Discontinued Medications    No medications on file              (Please note that portions of this note were completed with a voice recognition program.  Efforts were made to edit the dictations but occasionally words are mis-transcribed.)    Dewayne Waldron PA-C (electronically signed)            Dewayne Waldron PA-C  08/12/24 9112

## 2024-08-12 NOTE — PROGRESS NOTES
Patient seen in ED, room 12.  Admission completed with the following exceptions:  4 Eyes Assessment, Immunizations, Vaccines, Rights and Responsibilities, Orientation to room, Plan of Care, Education/Learning Assessment and Education Plan, white board, height and weight, pain assessment and head to toe assessment.  Patient is alert and oriented X 4.  Patient lives in a house alone and is being admitted for IMMANUEL.  Home Medications as well as Outside Sources have been verbally reviewed with patient and updated if appropriate.  Medication reconciliation is now Completed per pharmacy technician Sofya.  All questions answered.     Patient reports frequent diarrhea: orders previous placed for C. Diff rule out: contact isolation placed.    Patient sister is at bedside

## 2024-08-12 NOTE — PROGRESS NOTES
Patient has arrived to unit in stable condition. Vitals stable. Patient is awake, alert and oriented. Respirations are easy and unlabored. Patient does not appear to be in distress.Pt has emesis bag with her stating she feels sick to her stomach. Went to the restroom to have a BM, this nurse had placed a hat in the toilet for a sample to rule out  C-Diff, unsuccessful patient missed the hat.  Patient oriented to room and call light. Plan of care discussed with patient, patient agreeable. Call light within reach.

## 2024-08-13 LAB
ANION GAP SERPL CALCULATED.3IONS-SCNC: 15 MMOL/L (ref 3–16)
BACTERIA UR CULT: NORMAL
BASE EXCESS BLDV CALC-SCNC: -8.8 MMOL/L (ref -3–3)
BASOPHILS # BLD: 0 K/UL (ref 0–0.2)
BASOPHILS NFR BLD: 0.1 %
BUN SERPL-MCNC: 84 MG/DL (ref 7–20)
CALCIUM SERPL-MCNC: 8.6 MG/DL (ref 8.3–10.6)
CHLORIDE SERPL-SCNC: 109 MMOL/L (ref 99–110)
CK SERPL-CCNC: 50 U/L (ref 26–192)
CO2 BLDV-SCNC: 35 MMOL/L
CO2 SERPL-SCNC: 15 MMOL/L (ref 21–32)
COHGB MFR BLDV: 5.7 % (ref 0–1.5)
CREAT SERPL-MCNC: 2.9 MG/DL (ref 0.6–1.2)
DEPRECATED RDW RBC AUTO: 14.4 % (ref 12.4–15.4)
EOSINOPHIL # BLD: 0.1 K/UL (ref 0–0.6)
EOSINOPHIL NFR BLD: 2.8 %
FOLATE SERPL-MCNC: 13.9 NG/ML (ref 4.78–24.2)
GFR SERPLBLD CREATININE-BSD FMLA CKD-EPI: 17 ML/MIN/{1.73_M2}
GI PATHOGENS PNL STL NAA+PROBE: NORMAL
GLUCOSE SERPL-MCNC: 167 MG/DL (ref 70–99)
HCO3 BLDV-SCNC: 14.9 MMOL/L (ref 23–29)
HCT VFR BLD AUTO: 21.1 % (ref 36–48)
HCT VFR BLD AUTO: 22 % (ref 36–48)
HEMOCCULT STL QL: ABNORMAL
HGB BLD-MCNC: 7 G/DL (ref 12–16)
HGB BLD-MCNC: 7.1 G/DL (ref 12–16)
IRON SATN MFR SERPL: 22 % (ref 15–50)
IRON SERPL-MCNC: 39 UG/DL (ref 37–145)
LYMPHOCYTES # BLD: 0.2 K/UL (ref 1–5.1)
LYMPHOCYTES NFR BLD: 10.8 %
MCH RBC QN AUTO: 26.4 PG (ref 26–34)
MCHC RBC AUTO-ENTMCNC: 33.4 G/DL (ref 31–36)
MCV RBC AUTO: 79.1 FL (ref 80–100)
METHGB MFR BLDV: 0.9 %
MONOCYTES # BLD: 0.4 K/UL (ref 0–1.3)
MONOCYTES NFR BLD: 18.6 %
NEUTROPHILS # BLD: 1.3 K/UL (ref 1.7–7.7)
NEUTROPHILS NFR BLD: 67.7 %
O2 CT VFR BLDV CALC: 10 VOL %
O2 THERAPY: ABNORMAL
PATH INTERP BLD-IMP: NORMAL
PATH INTERP BLD-IMP: YES
PCO2 BLDV: 23.7 MMHG (ref 40–50)
PH BLDV: 7.41 [PH] (ref 7.35–7.45)
PLATELET # BLD AUTO: 129 K/UL (ref 135–450)
PMV BLD AUTO: 8.4 FL (ref 5–10.5)
PO2 BLDV: 192 MMHG (ref 25–40)
POTASSIUM SERPL-SCNC: 4.1 MMOL/L (ref 3.5–5.1)
RBC # BLD AUTO: 2.67 M/UL (ref 4–5.2)
SAO2 % BLDV: 100 %
SODIUM SERPL-SCNC: 139 MMOL/L (ref 136–145)
TACROLIMUS BLOOD: 1 NG/ML (ref 5–20)
TIBC SERPL-MCNC: 174 UG/DL (ref 260–445)
VIT B12 SERPL-MCNC: 570 PG/ML (ref 211–911)
WBC # BLD AUTO: 1.9 K/UL (ref 4–11)

## 2024-08-13 PROCEDURE — 87328 CRYPTOSPORIDIUM AG IA: CPT

## 2024-08-13 PROCEDURE — 82746 ASSAY OF FOLIC ACID SERUM: CPT

## 2024-08-13 PROCEDURE — 2500000003 HC RX 250 WO HCPCS: Performed by: INTERNAL MEDICINE

## 2024-08-13 PROCEDURE — 2580000003 HC RX 258: Performed by: INTERNAL MEDICINE

## 2024-08-13 PROCEDURE — 94760 N-INVAS EAR/PLS OXIMETRY 1: CPT

## 2024-08-13 PROCEDURE — 87336 ENTAMOEB HIST DISPR AG IA: CPT

## 2024-08-13 PROCEDURE — 83540 ASSAY OF IRON: CPT

## 2024-08-13 PROCEDURE — 36415 COLL VENOUS BLD VENIPUNCTURE: CPT

## 2024-08-13 PROCEDURE — 1200000000 HC SEMI PRIVATE

## 2024-08-13 PROCEDURE — 80197 ASSAY OF TACROLIMUS: CPT

## 2024-08-13 PROCEDURE — 6360000002 HC RX W HCPCS: Performed by: INTERNAL MEDICINE

## 2024-08-13 PROCEDURE — 85018 HEMOGLOBIN: CPT

## 2024-08-13 PROCEDURE — 82270 OCCULT BLOOD FECES: CPT

## 2024-08-13 PROCEDURE — 97535 SELF CARE MNGMENT TRAINING: CPT

## 2024-08-13 PROCEDURE — 82607 VITAMIN B-12: CPT

## 2024-08-13 PROCEDURE — 85014 HEMATOCRIT: CPT

## 2024-08-13 PROCEDURE — 83550 IRON BINDING TEST: CPT

## 2024-08-13 PROCEDURE — 87329 GIARDIA AG IA: CPT

## 2024-08-13 PROCEDURE — 84155 ASSAY OF PROTEIN SERUM: CPT

## 2024-08-13 PROCEDURE — 80048 BASIC METABOLIC PNL TOTAL CA: CPT

## 2024-08-13 PROCEDURE — 82550 ASSAY OF CK (CPK): CPT

## 2024-08-13 PROCEDURE — 85025 COMPLETE CBC W/AUTO DIFF WBC: CPT

## 2024-08-13 PROCEDURE — 82803 BLOOD GASES ANY COMBINATION: CPT

## 2024-08-13 PROCEDURE — 84165 PROTEIN E-PHORESIS SERUM: CPT

## 2024-08-13 PROCEDURE — 97530 THERAPEUTIC ACTIVITIES: CPT

## 2024-08-13 PROCEDURE — 97165 OT EVAL LOW COMPLEX 30 MIN: CPT

## 2024-08-13 PROCEDURE — 97116 GAIT TRAINING THERAPY: CPT

## 2024-08-13 PROCEDURE — 6370000000 HC RX 637 (ALT 250 FOR IP): Performed by: INTERNAL MEDICINE

## 2024-08-13 PROCEDURE — 97161 PT EVAL LOW COMPLEX 20 MIN: CPT

## 2024-08-13 RX ORDER — SODIUM CHLORIDE, SODIUM LACTATE, POTASSIUM CHLORIDE, CALCIUM CHLORIDE 600; 310; 30; 20 MG/100ML; MG/100ML; MG/100ML; MG/100ML
INJECTION, SOLUTION INTRAVENOUS CONTINUOUS
Status: ACTIVE | OUTPATIENT
Start: 2024-08-13 | End: 2024-08-14

## 2024-08-13 RX ORDER — TACROLIMUS 0.5 MG/1
1 CAPSULE ORAL 2 TIMES DAILY
Status: DISCONTINUED | OUTPATIENT
Start: 2024-08-13 | End: 2024-08-16

## 2024-08-13 RX ADMIN — CIPROFLOXACIN 400 MG: 400 INJECTION, SOLUTION INTRAVENOUS at 18:27

## 2024-08-13 RX ADMIN — SUCRALFATE 1 G: 1 TABLET ORAL at 23:47

## 2024-08-13 RX ADMIN — SUCRALFATE 1 G: 1 TABLET ORAL at 18:14

## 2024-08-13 RX ADMIN — FAMOTIDINE 20 MG: 20 TABLET ORAL at 09:23

## 2024-08-13 RX ADMIN — SODIUM CHLORIDE, PRESERVATIVE FREE 10 ML: 5 INJECTION INTRAVENOUS at 20:04

## 2024-08-13 RX ADMIN — TACROLIMUS 1 MG: 0.5 CAPSULE ORAL at 11:49

## 2024-08-13 RX ADMIN — SODIUM BICARBONATE: 84 INJECTION, SOLUTION INTRAVENOUS at 02:28

## 2024-08-13 RX ADMIN — TACROLIMUS 1 MG: 0.5 CAPSULE ORAL at 20:04

## 2024-08-13 RX ADMIN — LEVOTHYROXINE SODIUM 112 MCG: 0.11 TABLET ORAL at 05:47

## 2024-08-13 RX ADMIN — METOPROLOL TARTRATE 200 MG: 50 TABLET, FILM COATED ORAL at 09:23

## 2024-08-13 RX ADMIN — METOPROLOL TARTRATE 200 MG: 50 TABLET, FILM COATED ORAL at 20:04

## 2024-08-13 RX ADMIN — SODIUM CHLORIDE, POTASSIUM CHLORIDE, SODIUM LACTATE AND CALCIUM CHLORIDE: 600; 310; 30; 20 INJECTION, SOLUTION INTRAVENOUS at 11:46

## 2024-08-13 RX ADMIN — SUCRALFATE 1 G: 1 TABLET ORAL at 05:47

## 2024-08-13 RX ADMIN — SUCRALFATE 1 G: 1 TABLET ORAL at 11:49

## 2024-08-13 RX ADMIN — ASPIRIN 81 MG: 81 TABLET, COATED ORAL at 09:23

## 2024-08-13 RX ADMIN — ATORVASTATIN CALCIUM 20 MG: 20 TABLET, FILM COATED ORAL at 20:04

## 2024-08-13 RX ADMIN — ALLOPURINOL 100 MG: 100 TABLET ORAL at 09:23

## 2024-08-13 NOTE — PROGRESS NOTES
[facility] Note    Patient Active Problem List   Diagnosis    IMMANUEL (acute kidney injury) (HCC)       Past Medical History:   has a past medical history of Clostridium difficile diarrhea, Esophageal reflux, Hyperlipidemia, Hypertension, Nephropathy, membranous, Nephrotic syndrome, and Thyroid disease.    Past Social History:   reports that she has never smoked. She has never used smokeless tobacco. She reports that she does not drink alcohol and does not use drugs.    Subjective:    Diarrhea better    Review of Systems   Constitutional:  Positive for fatigue. Negative for activity change, appetite change, chills, fever and unexpected weight change.   HENT:  Negative for congestion and facial swelling.    Eyes:  Negative for photophobia, discharge and redness.   Respiratory:  Negative for cough, chest tightness and shortness of breath.    Cardiovascular:  Negative for chest pain, palpitations and leg swelling.   Gastrointestinal:  Positive for diarrhea. Negative for abdominal distention, abdominal pain, blood in stool, constipation, nausea and vomiting.   Endocrine: Negative for cold intolerance, heat intolerance and polyuria.   Genitourinary:  Negative for decreased urine volume, difficulty urinating, flank pain and hematuria.   Musculoskeletal:  Negative for joint swelling and neck pain.   Neurological:  Negative for dizziness, seizures, syncope, speech difficulty, light-headedness and headaches.   Hematological:  Does not bruise/bleed easily.   Psychiatric/Behavioral:  Negative for agitation, confusion and hallucinations.        Objective:      /74   Pulse 75   Temp 98.1 °F (36.7 °C) (Oral)   Resp 18   Ht 1.626 m (5' 4\")   Wt 74.8 kg (165 lb)   SpO2 97%   BMI 28.32 kg/m²     Wt Readings from Last 3 Encounters:   08/12/24 74.8 kg (165 lb)   07/30/24 78.5 kg (173 lb)   06/11/24 83 kg (183 lb)       BP Readings from Last 3 Encounters:   08/13/24

## 2024-08-13 NOTE — CONSULTS
Gastroenterology Consult Note        Patient: Jessica Reyes  : 1950  Acct#:      Date:  2024      1. Acute renal failure superimposed on chronic kidney disease, unspecified acute renal failure type, unspecified CKD stage (HCC)    2. Diarrhea, unspecified type    3. Nausea    4. Abdominal pain, epigastric        Subjective:       History of Present Illness  Patient is a 73 y.o.  female admitted with Abdominal pain, epigastric [R10.13]  Nausea [R11.0]  IMMANUEL (acute kidney injury) (HCC) [N17.9]  Diarrhea, unspecified type [R19.7]  Acute renal failure superimposed on chronic kidney disease, unspecified acute renal failure type, unspecified CKD stage (HCC) [N17.9, N18.9] who is seen in consult for diarrhea.  History of kidney transplant in  at .  She is on tacrolimus and mycophenolate.  History of C. difficile a few years ago treated at .  1 week ago she started having diarrhea which occurred all throughout the day.  No abdominal pain, nausea, vomiting.  No melena or hematochezia.  She came to the ER yesterday.  Had nausea yesterday without vomiting.  Had some vague abdominal discomfort and then as well but none today.  No further nausea.  Had about 4 bowel movements today but states they are starting to have some consistency and are smaller.  No new meds.    Past Medical History:   Diagnosis Date    Clostridium difficile diarrhea     history of    Esophageal reflux     Hyperlipidemia     Hypertension     Nephropathy, membranous     Nephrotic syndrome     Thyroid disease     hypotyhroidism      Past Surgical History:   Procedure Laterality Date    ABDOMEN SURGERY      APPENDECTOMY      ELBOW ARTHROSCOPY      bilaterally    KIDNEY TRANSPLANT      KNEE ARTHROSCOPY      bilaterally    TONSILLECTOMY        Past Endoscopic History: Colonoscopy at  prior to her kidney transplant in .  .    Admission Meds  No current facility-administered medications on file prior to

## 2024-08-13 NOTE — PLAN OF CARE
Problem: Neurosensory - Adult  Goal: Achieves stable or improved neurological status  Reactivated  Goal: Remains free of injury related to seizures activity  Reactivated  Goal: Achieves maximal functionality and self care  Reactivated     Problem: Respiratory - Adult  Goal: Achieves optimal ventilation and oxygenation  Reactivated     Problem: Cardiovascular - Adult  Goal: Maintains optimal cardiac output and hemodynamic stability  Reactivated  Goal: Absence of cardiac dysrhythmias or at baseline  Reactivated     Problem: Skin/Tissue Integrity - Adult  Goal: Skin integrity remains intact  Reactivated  Goal: Incisions, wounds, or drain sites healing without S/S of infection  Reactivated  Goal: Oral mucous membranes remain intact  Reactivated     Problem: Musculoskeletal - Adult  Goal: Return mobility to safest level of function  Reactivated  Goal: Maintain proper alignment of affected body part  Reactivated  Goal: Return ADL status to a safe level of function  Reactivated     Problem: Gastrointestinal - Adult  Goal: Minimal or absence of nausea and vomiting  Reactivated  Goal: Maintains or returns to baseline bowel function  Reactivated  Goal: Maintains adequate nutritional intake  Reactivated     Problem: Genitourinary - Adult  Goal: Absence of urinary retention  Reactivated     Problem: Infection - Adult  Goal: Absence of infection at discharge  Reactivated  Goal: Absence of infection during hospitalization  Reactivated     Problem: Metabolic/Fluid and Electrolytes - Adult  Goal: Electrolytes maintained within normal limits  Reactivated  Goal: Hemodynamic stability and optimal renal function maintained  Reactivated  Goal: Glucose maintained within prescribed range  Reactivated     Problem: Hematologic - Adult  Goal: Maintains hematologic stability  Reactivated

## 2024-08-13 NOTE — PROGRESS NOTES
[facility] - Inpatient Rehabilitation Department   Phone: [phone]    Occupational Therapy    [x] Initial Evaluation            [] Daily Treatment Note         [] Discharge Summary      Patient: Jessica Reyes   : 1950   MRN: [id number]   Date of Service:  2024    Admitting Diagnosis:  IMMANUEL (acute kidney injury) (HCC)  Current Admission Summary: Per EMR: Jessica Reyes is a 73 y.o. female who presents to the emergency department complaining of diarrhea for 1 week.  She does report some nausea and occasional epigastric discomfort.  She describes the diarrhea as watery.  She denies recent travel, surgery, hospitalization, fever, chills, sick exposures, recent antibiotic use, ingestion of seafood,  or spicy food prior to onset of symptoms.  Today, she feels weak and fatigued.  She thinks that she is dehydrated.  Patient does have history of chronic kidney disease and sees Dr. Duffy, nephrologist.   Past Medical History:  has a past medical history of Clostridium difficile diarrhea, Esophageal reflux, Hyperlipidemia, Hypertension, Nephropathy, membranous, Nephrotic syndrome, and Thyroid disease.  Past Surgical History:  has a past surgical history that includes Abdomen surgery; Tonsillectomy; Knee arthroscopy; Elbow arthroscopy; Kidney transplant (); and Appendectomy.    Discharge Recommendations: Jessica Reyes scored a 19/24 on the AM-PAC ADL Inpatient form. Current research shows that an AM-PAC score of 18 or greater is typically associated with a discharge to the patient's home setting. Based on the patient's AM-PAC score, and their current ADL deficits, it is recommended that the patient have 2-3 sessions per week of Occupational Therapy at d/c to increase the patient's independence.  At this time, this patient demonstrates the endurance and safety to discharge home with home based OT (home vs OP services) and a follow up treatment frequency of 2-3x/wk.   Please see

## 2024-08-13 NOTE — PROGRESS NOTES
[facility] - Inpatient Rehabilitation Department   Phone: [phone]    Physical Therapy    [x] Initial Evaluation            [] Daily Treatment Note         [] Discharge Summary      Patient: Jessica Reyes   : 1950   MRN: [id number]   Date of Service:  2024  Admitting Diagnosis: IMMANUEL (acute kidney injury) (HCC)  Current Admission Summary: Per ED provider note \"Jessica Reyes is a 73 y.o. female who presents to the emergency department complaining of diarrhea for 1 week.  She does report some nausea and occasional epigastric discomfort.  She describes the diarrhea as watery.  She denies recent travel, surgery, hospitalization, fever, chills, sick exposures, recent antibiotic use, ingestion of seafood,  or spicy food prior to onset of symptoms.  Today, she feels weak and fatigued.  She thinks that she is dehydrated.  Patient does have history of chronic kidney disease and sees Dr. Duffy, nephrologist\".  Past Medical History:  has a past medical history of Clostridium difficile diarrhea, Esophageal reflux, Hyperlipidemia, Hypertension, Nephropathy, membranous, Nephrotic syndrome, and Thyroid disease.  Past Surgical History:  has a past surgical history that includes Abdomen surgery; Tonsillectomy; Knee arthroscopy; Elbow arthroscopy; Kidney transplant (); and Appendectomy.    Discharge Recommendations: Jessica Reyes scored a 18/24 on the AM-PAC short mobility form. Current research shows that an AM-PAC score of 18 or greater is typically associated with a discharge to the patient's home setting. Based on the patient's AM-PAC score and their current functional mobility deficits, it is recommended that the patient have 2-3 sessions per week of Physical Therapy at d/c to increase the patient's independence.  At this time, this patient demonstrates the endurance and safety to discharge home with home health services and a follow up treatment frequency of 2-3x/wk.  Please see  need for safety  Initiation: requires cues for some  Sequencing: requires cues for some  Comments: Pt requires cues for impulsiveness d/t baseline functional mobility  Orientation:    alert and oriented x 4  Command Following:   WFL    Education  Barriers To Learning: none  Patient Education: patient educated on goals, PT role and benefits, plan of care, precautions, HEP, general safety, functional mobility training, proper use of assistive device/equipment, energy conservation, transfer training, discharge recommendations  Learning Assessment:  patient verbalizes understanding, would benefit from continued reinforcement    Assessment  Activity Tolerance: Good, pt able to complete ambulation and stairs with minimal fatigu. General fatigue noted with lack of food/electrolytes  Impairments Requiring Therapeutic Intervention: decreased functional mobility, decreased ADL status, decreased ROM, decreased strength, decreased safety awareness, decreased endurance, decreased balance  Prognosis: good  Clinical Assessment: Pt is a 72 y/o female who is arrives with persistent diarrhea for over 1 week, with reports of consistent nausea, weakness, and fatigue. She states that she feels dehydrated and states her mouth is dry throughout treatment, has been getting up freely to go the bathroom no problem (needs to hold onto IV pole for stability). Pt was previously independent with all ADLs, transfers, and ambulation in the home without use of assistive device, although states has a SPC (walking pole) that she used to use frequently. Pt lives alone and is required to negotiate stairs to basement 2-3x a week for laundry, sister lives close but does not assist with daily life. Pt currently displays unsteadiness and LE weakness (knee buckling) with ambulation and stair negotiation without use of assistive device. Although hesitant to try RW, pt was educated on temporary use of RW for steadiness and stability in the hospital and

## 2024-08-13 NOTE — PROGRESS NOTES
[facility]    8/13/2024 11:50 AM        Name: Jessica Reyes .              Admitted: 8/12/2024  Primary Care Provider: Mehran Stein MD (Tel: [phone])        Subjective:    No fevers overnight denies any blood in stool still having diarrhea 3 episodes thus far today    Reviewed interval ancillary notes    Current Medications  lactated ringers IV soln infusion, Continuous  tacrolimus (proGRAF) capsule 1 mg, BID  aspirin EC tablet 81 mg, Daily  allopurinol (ZYLOPRIM) tablet 100 mg, Daily  famotidine (PEPCID) tablet 20 mg, Daily  levothyroxine (SYNTHROID) tablet 112 mcg, Daily  metoprolol tartrate (LOPRESSOR) tablet 200 mg, BID  atorvastatin (LIPITOR) tablet 20 mg, Nightly  sucralfate (CARAFATE) tablet 1 g, 4 times per day  sodium chloride flush 0.9 % injection 5-40 mL, 2 times per day  sodium chloride flush 0.9 % injection 5-40 mL, PRN  0.9 % sodium chloride infusion, PRN  [Held by provider] enoxaparin Sodium (LOVENOX) injection 30 mg, Daily  polyethylene glycol (GLYCOLAX) packet 17 g, Daily PRN  acetaminophen (TYLENOL) tablet 650 mg, Q6H PRN   Or  acetaminophen (TYLENOL) suppository 650 mg, Q6H PRN  ciprofloxacin (CIPRO) IVPB 400 mg, Q24H  promethazine (PHENERGAN) 12.5 mg in sodium chloride 0.9 % 50 mL IVPB, Q6H PRN        Objective:  /74   Pulse 75   Temp 98.1 °F (36.7 °C) (Oral)   Resp 18   Ht 1.626 m (5' 4\")   Wt 74.8 kg (165 lb)   SpO2 97%   BMI 28.32 kg/m²   No intake or output data in the 24 hours ending 08/13/24 1150   Wt Readings from Last 3 Encounters:   08/12/24 74.8 kg (165 lb)   07/30/24 78.5 kg (173 lb)   06/11/24 83 kg (183 lb)       General appearance:  Appears comfortable. AAOx3  HEENT: atraumatic, Pupils equal, muscous membranes moist, no masses appreciated  Cardiovascular: Regular rate and rhythm no murmurs appreciated  Respiratory: CTAB no wheezing  Gastrointestinal: Abdomen

## 2024-08-14 ENCOUNTER — APPOINTMENT (OUTPATIENT)
Dept: CT IMAGING | Age: 74
DRG: 987 | End: 2024-08-14
Payer: MEDICARE

## 2024-08-14 LAB
ABO + RH BLD: NORMAL
ALBUMIN SERPL ELPH-MCNC: 2.8 G/DL (ref 3.1–4.9)
ALPHA1 GLOB SERPL ELPH-MCNC: 0.3 G/DL (ref 0.2–0.4)
ALPHA2 GLOB SERPL ELPH-MCNC: 0.9 G/DL (ref 0.4–1.1)
ANION GAP SERPL CALCULATED.3IONS-SCNC: 13 MMOL/L (ref 3–16)
B-GLOBULIN SERPL ELPH-MCNC: 0.7 G/DL (ref 0.9–1.6)
BASOPHILS # BLD: 0 K/UL (ref 0–0.2)
BASOPHILS NFR BLD: 0.2 %
BLD GP AB SCN SERPL QL: NORMAL
BLOOD BANK DISPENSE STATUS: NORMAL
BLOOD BANK PRODUCT CODE: NORMAL
BPU ID: NORMAL
BUN SERPL-MCNC: 75 MG/DL (ref 7–20)
CALCIUM SERPL-MCNC: 8.4 MG/DL (ref 8.3–10.6)
CHLORIDE SERPL-SCNC: 108 MMOL/L (ref 99–110)
CO2 SERPL-SCNC: 19 MMOL/L (ref 21–32)
CREAT SERPL-MCNC: 3.1 MG/DL (ref 0.6–1.2)
DEPRECATED RDW RBC AUTO: 14.3 % (ref 12.4–15.4)
DESCRIPTION BLOOD BANK: NORMAL
EOSINOPHIL # BLD: 0.1 K/UL (ref 0–0.6)
EOSINOPHIL NFR BLD: 3.5 %
GAMMA GLOB SERPL ELPH-MCNC: 0.3 G/DL (ref 0.6–1.8)
GFR SERPLBLD CREATININE-BSD FMLA CKD-EPI: 15 ML/MIN/{1.73_M2}
GLUCOSE SERPL-MCNC: 121 MG/DL (ref 70–99)
HCT VFR BLD AUTO: 20.7 % (ref 36–48)
HGB BLD-MCNC: 6.6 G/DL (ref 12–16)
LYMPHOCYTES # BLD: 0.2 K/UL (ref 1–5.1)
LYMPHOCYTES NFR BLD: 15.1 %
MCH RBC QN AUTO: 25.6 PG (ref 26–34)
MCHC RBC AUTO-ENTMCNC: 31.8 G/DL (ref 31–36)
MCV RBC AUTO: 80.6 FL (ref 80–100)
MONOCYTES # BLD: 0.3 K/UL (ref 0–1.3)
MONOCYTES NFR BLD: 17.2 %
NEUTROPHILS # BLD: 1 K/UL (ref 1.7–7.7)
NEUTROPHILS NFR BLD: 64 %
PATH INTERP BLD-IMP: NO
PLATELET # BLD AUTO: 120 K/UL (ref 135–450)
PMV BLD AUTO: 9 FL (ref 5–10.5)
POTASSIUM SERPL-SCNC: 4.2 MMOL/L (ref 3.5–5.1)
PROT SERPL-MCNC: 5 G/DL (ref 6.4–8.2)
RBC # BLD AUTO: 2.57 M/UL (ref 4–5.2)
SODIUM SERPL-SCNC: 140 MMOL/L (ref 136–145)
SPE/IFE INTERPRETATION: NORMAL
WBC # BLD AUTO: 1.6 K/UL (ref 4–11)

## 2024-08-14 PROCEDURE — 85025 COMPLETE CBC W/AUTO DIFF WBC: CPT

## 2024-08-14 PROCEDURE — 94760 N-INVAS EAR/PLS OXIMETRY 1: CPT

## 2024-08-14 PROCEDURE — 88185 FLOWCYTOMETRY/TC ADD-ON: CPT

## 2024-08-14 PROCEDURE — 1200000000 HC SEMI PRIVATE

## 2024-08-14 PROCEDURE — 6360000002 HC RX W HCPCS: Performed by: INTERNAL MEDICINE

## 2024-08-14 PROCEDURE — 6360000002 HC RX W HCPCS: Performed by: RADIOLOGY

## 2024-08-14 PROCEDURE — 38221 DX BONE MARROW BIOPSIES: CPT

## 2024-08-14 PROCEDURE — 2700000000 HC OXYGEN THERAPY PER DAY

## 2024-08-14 PROCEDURE — 80048 BASIC METABOLIC PNL TOTAL CA: CPT

## 2024-08-14 PROCEDURE — P9016 RBC LEUKOCYTES REDUCED: HCPCS

## 2024-08-14 PROCEDURE — 36430 TRANSFUSION BLD/BLD COMPNT: CPT

## 2024-08-14 PROCEDURE — 88313 SPECIAL STAINS GROUP 2: CPT

## 2024-08-14 PROCEDURE — 88342 IMHCHEM/IMCYTCHM 1ST ANTB: CPT

## 2024-08-14 PROCEDURE — 36415 COLL VENOUS BLD VENIPUNCTURE: CPT

## 2024-08-14 PROCEDURE — 86850 RBC ANTIBODY SCREEN: CPT

## 2024-08-14 PROCEDURE — 86923 COMPATIBILITY TEST ELECTRIC: CPT

## 2024-08-14 PROCEDURE — 86900 BLOOD TYPING SEROLOGIC ABO: CPT

## 2024-08-14 PROCEDURE — 88184 FLOWCYTOMETRY/ TC 1 MARKER: CPT

## 2024-08-14 PROCEDURE — 6370000000 HC RX 637 (ALT 250 FOR IP): Performed by: INTERNAL MEDICINE

## 2024-08-14 PROCEDURE — 30233N1 TRANSFUSION OF NONAUTOLOGOUS RED BLOOD CELLS INTO PERIPHERAL VEIN, PERCUTANEOUS APPROACH: ICD-10-PCS | Performed by: INTERNAL MEDICINE

## 2024-08-14 PROCEDURE — 88311 DECALCIFY TISSUE: CPT

## 2024-08-14 PROCEDURE — 88305 TISSUE EXAM BY PATHOLOGIST: CPT

## 2024-08-14 PROCEDURE — 2580000003 HC RX 258: Performed by: INTERNAL MEDICINE

## 2024-08-14 PROCEDURE — 86901 BLOOD TYPING SEROLOGIC RH(D): CPT

## 2024-08-14 RX ORDER — FENTANYL CITRATE 50 UG/ML
INJECTION, SOLUTION INTRAMUSCULAR; INTRAVENOUS PRN
Status: COMPLETED | OUTPATIENT
Start: 2024-08-14 | End: 2024-08-14

## 2024-08-14 RX ORDER — SODIUM CHLORIDE 9 MG/ML
INJECTION, SOLUTION INTRAVENOUS PRN
Status: DISCONTINUED | OUTPATIENT
Start: 2024-08-14 | End: 2024-08-21 | Stop reason: HOSPADM

## 2024-08-14 RX ORDER — SODIUM CHLORIDE, SODIUM LACTATE, POTASSIUM CHLORIDE, CALCIUM CHLORIDE 600; 310; 30; 20 MG/100ML; MG/100ML; MG/100ML; MG/100ML
INJECTION, SOLUTION INTRAVENOUS CONTINUOUS
Status: DISCONTINUED | OUTPATIENT
Start: 2024-08-14 | End: 2024-08-15

## 2024-08-14 RX ORDER — MIDAZOLAM HYDROCHLORIDE 2 MG/2ML
INJECTION, SOLUTION INTRAMUSCULAR; INTRAVENOUS PRN
Status: COMPLETED | OUTPATIENT
Start: 2024-08-14 | End: 2024-08-14

## 2024-08-14 RX ADMIN — CIPROFLOXACIN 400 MG: 400 INJECTION, SOLUTION INTRAVENOUS at 18:14

## 2024-08-14 RX ADMIN — FAMOTIDINE 20 MG: 20 TABLET ORAL at 13:08

## 2024-08-14 RX ADMIN — SODIUM CHLORIDE, PRESERVATIVE FREE 10 ML: 5 INJECTION INTRAVENOUS at 20:34

## 2024-08-14 RX ADMIN — ACETAMINOPHEN 650 MG: 325 TABLET ORAL at 20:34

## 2024-08-14 RX ADMIN — ATORVASTATIN CALCIUM 20 MG: 20 TABLET, FILM COATED ORAL at 20:34

## 2024-08-14 RX ADMIN — FENTANYL CITRATE 50 MCG: 50 INJECTION, SOLUTION INTRAMUSCULAR; INTRAVENOUS at 10:55

## 2024-08-14 RX ADMIN — METOPROLOL TARTRATE 200 MG: 50 TABLET, FILM COATED ORAL at 20:34

## 2024-08-14 RX ADMIN — MIDAZOLAM HYDROCHLORIDE 1 MG: 1 INJECTION, SOLUTION INTRAMUSCULAR; INTRAVENOUS at 10:50

## 2024-08-14 RX ADMIN — TACROLIMUS 1 MG: 0.5 CAPSULE ORAL at 13:06

## 2024-08-14 RX ADMIN — FENTANYL CITRATE 50 MCG: 50 INJECTION, SOLUTION INTRAMUSCULAR; INTRAVENOUS at 10:58

## 2024-08-14 RX ADMIN — SUCRALFATE 1 G: 1 TABLET ORAL at 18:10

## 2024-08-14 RX ADMIN — MIDAZOLAM HYDROCHLORIDE 1 MG: 1 INJECTION, SOLUTION INTRAMUSCULAR; INTRAVENOUS at 10:55

## 2024-08-14 RX ADMIN — LEVOTHYROXINE SODIUM 112 MCG: 0.11 TABLET ORAL at 05:45

## 2024-08-14 RX ADMIN — ALLOPURINOL 100 MG: 100 TABLET ORAL at 13:06

## 2024-08-14 RX ADMIN — SUCRALFATE 1 G: 1 TABLET ORAL at 13:06

## 2024-08-14 RX ADMIN — MIDAZOLAM HYDROCHLORIDE 1 MG: 1 INJECTION, SOLUTION INTRAMUSCULAR; INTRAVENOUS at 10:58

## 2024-08-14 RX ADMIN — TACROLIMUS 1 MG: 0.5 CAPSULE ORAL at 20:34

## 2024-08-14 RX ADMIN — SUCRALFATE 1 G: 1 TABLET ORAL at 05:45

## 2024-08-14 ASSESSMENT — PAIN SCALES - GENERAL: PAINLEVEL_OUTOF10: 0

## 2024-08-14 NOTE — CARE COORDINATION
Case Management Assessment  Initial Evaluation    Date/Time of Evaluation: 8/14/2024 2:40 PM   Assessment Completed by: GUME JIMÉNEZ RN    If patient is discharged prior to next notation, then this note serves as note for discharge by case management.    Patient Name: Jessica Reyes                   YOB: 1950  Diagnosis: Abdominal pain, epigastric [R10.13]  Nausea [R11.0]  IMMANUEL (acute kidney injury) (HCC) [N17.9]  Diarrhea, unspecified type [R19.7]  Acute renal failure superimposed on chronic kidney disease, unspecified acute renal failure type, unspecified CKD stage (HCC) [N17.9, N18.9]                   Date / Time: 8/12/2024 11:55 AM    Patient Admission Status: Inpatient   Readmission Risk (Low < 19, Mod (19-27), High > 27): Readmission Risk Score: 17.4    Current PCP: Mehran Stein MD  PCP verified by CM? Yes    Chart Reviewed: Yes      History Provided by: Patient  Patient Orientation: Alert and Oriented    Patient Cognition: Alert    Hospitalization in the last 30 days (Readmission):  No    If yes, Readmission Assessment in CM Navigator will be completed.    Advance Directives:      Code Status: Full Code   Patient's Primary Decision Maker is: Legal Next of Kin    Primary Decision Maker: Radha Chinchilla - Brother/Sister - [phone]    Discharge Planning:    Patient lives with: Alone Type of Home: House, Other (Comment) (3 level home)  Primary Care Giver: Self  Patient Support Systems include: Family Members   Current Financial resources: Medicare  Current community resources: None  Current services prior to admission: Durable Medical Equipment            Current DME: Cane            Type of Home Care services:  None    ADLS  Prior functional level: Independent in ADLs/IADLs  Current functional level: Assistance with the following:, Other (see comment), Mobility (therapy recommends home care)    PT AM-PAC: 18 /24  OT AM-PAC: 19 /24    Family can provide assistance at DC: Yes  Would you  family were provided with a choice of provider and agrees with the discharge plan. Freedom of choice list with basic dialogue that supports the patient's individualized plan of care/goals and shares the quality data associated with the providers was provided to: Patient   Patient Representative Name:       The Patient and/or Patient Representative Agree with the Discharge Plan? Yes    Glory Morales RN, BSN  [phone]

## 2024-08-14 NOTE — PROGRESS NOTES
ONCOLOGY HEMATOLOGY CARE PROGRESS NOTE      SUBJECTIVE:  Weak and still has diarrhea.     ROS:     Constitutional:  No weight loss, No fever, No chills, No night sweats. Weakness.  Eyes:  No impairment or change in vision  ENT / Mouth:  No pain, abnormal ulceration, bleeding, nasal drip or change in voice or hearing  Cardiovascular:  No chest pain, palpitations, new edema, or calf discomfort  Respiratory:  No pain, hemoptysis, change to breathing  Breast:  No pain, discharge, change in appearance or texture  Gastrointestinal: Persistent diarrhea.  Urinary:  No pain, bleeding or change in continence  Genitalia: No pain, bleeding or discharge  Musculoskeletal:  No redness, pain, edema or weakness  Skin:  No pruritus, rash, change to nodules or lesions  Neurologic:  No discomfort, change in mental status, speech, sensory or motor activity  Psychiatric:  No change in concentration or change to affect or mood  Endocrine:  No hot flashes, increased thirst, or change to urine production  Hematologic: No petechiae, ecchymosis or bleeding  Lymphatic:  No lymphadenopathy or lymphedema  Allergy / Immunologic:  No eczema, hives, frequent or recurrent infections    OBJECTIVE        Physical    VITALS:  Patient Vitals for the past 24 hrs:   BP Temp Temp src Pulse Resp SpO2   08/14/24 0815 119/69 97.3 °F (36.3 °C) Oral 66 16 95 %   08/14/24 0346 114/74 97.8 °F (36.6 °C) Oral 67 16 94 %   08/13/24 2000 123/81 98.2 °F (36.8 °C) Oral 70 16 96 %   08/13/24 1505 118/76 98 °F (36.7 °C) Oral 78 16 95 %   08/13/24 1345 -- -- -- 75 16 94 %   08/13/24 1154 105/72 97.5 °F (36.4 °C) Oral 73 16 95 %   08/13/24 0921 117/74 98.1 °F (36.7 °C) Oral 75 18 97 %       24HR INTAKE/OUTPUT:    Intake/Output Summary (Last 24 hours) at 8/14/2024 0834  Last data filed at 8/13/2024 1611  Gross per 24 hour   Intake 440 ml   Output --   Net 440 ml       CONSTITUTIONAL: awake, alert, cooperative, no apparent distress  degenerative change of the spine.  No lytic or  blastic lesion.  Grade 2 L5 on S1 anterior spondylolisthesis.  Impression: No acute abdominal or pelvic finding.    Atrophic right and left native kidneys with unremarkable right pelvic kidney.    Hiatal hernia.    Slightly hypoplastic liver may reflect mild cirrhosis in the correct clinical  and laboratory setting.      Problem List  Patient Active Problem List   Diagnosis    IMMANUEL (acute kidney injury) (HCC)       ASSESSMENT AND PLAN:  Worsening anemia.  No clear evidence of B12, folate or iron deficiency.   Leukopenia  Mild thrombocytopenia  IMMANUEL on CKD  History of kidney transplant in early 2013.  On tacrolimus.     Reviewed her labs.  Persistent leukopenia and worsening anemia.  Will give one unit of PRBC transfusion.  Stool guaiac was positive and GI is on the case.  Will have bone marrow aspiration and biopsy today.  Discussed with the hospitalist.  Spoke to the nurse.         ONCOLOGIC DISPOSITION:      Tenzin Swneson MD  Please contact through [employer]

## 2024-08-14 NOTE — CARE COORDINATION
Chart reviewed at this time for discharge planning.    Pt from home. Per notes will have bone marrow aspiration and biopsy today.    Therapy recommending home care, will follow up with pt.    Glory Morales RN, BSN  [phone]

## 2024-08-14 NOTE — PROGRESS NOTES
Shift assessment completed. Routine vitals obtained. Scheduled medications given. Patient is awake, alert and oriented. Patient is resting comfortably at this time. Call light within reach. No further needs expressed.

## 2024-08-14 NOTE — CONSENT
Informed Consent for Blood Component Transfusion Note    I have discussed with the patient the rationale for blood component transfusion; its benefits in treating or preventing fatigue, organ damage, or death; and its risk which includes mild transfusion reactions, rare risk of blood borne infection, or more serious but rare reactions. I have discussed the alternatives to transfusion, including the risk and consequences of not receiving transfusion. The patient had an opportunity to ask questions and had agreed to proceed with transfusion of blood components.    Electronically signed by Ra Boothe MD on 8/14/24 at 12:20 PM EDT

## 2024-08-14 NOTE — BRIEF OP NOTE
Brief Postoperative Note    Jessica Reyes  YOB: 1950  [id number]    Pre-operative Diagnosis: anemia    Post-operative Diagnosis: Same    Procedure: CT-guided bone marrow biopsy    Anesthesia: Moderate Sedation    Surgeons: Wild Benjamin MD    Estimated Blood Loss: Less than 5 mL    Complications: None    Specimens: Was Obtained: 15 cc bone marrow aspirate and core    Findings: Successful CT-guided bone marrow biospy.    Electronically signed by Wild Benjamin MD on 8/14/2024 at 11:01 AM

## 2024-08-14 NOTE — PROGRESS NOTES
Occupational Therapy    Patient currently on hold due to receiving PRBC for HGB of 6.6   Will re-attempt as schedule and patient's medical status permits.     Thanks,  Reji Silva OTR/L [id number]

## 2024-08-14 NOTE — PROGRESS NOTES
[facility] Note    Patient Active Problem List   Diagnosis    IMMANUEL (acute kidney injury) (HCC)       Past Medical History:   has a past medical history of Clostridium difficile diarrhea, Esophageal reflux, Hyperlipidemia, Hypertension, Nephropathy, membranous, Nephrotic syndrome, and Thyroid disease.    Past Social History:   reports that she has never smoked. She has never used smokeless tobacco. She reports that she does not drink alcohol and does not use drugs.    Subjective:    Diarrhea better  Underwent CT-guided bone marrow biopsy today  Receiving packed red blood cell transfusion    Review of Systems   Constitutional:  Positive for fatigue. Negative for activity change, appetite change, chills, fever and unexpected weight change.   HENT:  Negative for congestion and facial swelling.    Eyes:  Negative for photophobia, discharge and redness.   Respiratory:  Negative for cough, chest tightness and shortness of breath.    Cardiovascular:  Negative for chest pain, palpitations and leg swelling.   Gastrointestinal:  Positive for diarrhea. Negative for abdominal distention, abdominal pain, blood in stool, constipation, nausea and vomiting.   Endocrine: Negative for cold intolerance, heat intolerance and polyuria.   Genitourinary:  Negative for decreased urine volume, difficulty urinating, flank pain and hematuria.   Musculoskeletal:  Negative for joint swelling and neck pain.   Neurological:  Negative for dizziness, seizures, syncope, speech difficulty, light-headedness and headaches.   Hematological:  Does not bruise/bleed easily.   Psychiatric/Behavioral:  Negative for agitation, confusion and hallucinations.        Objective:    Urine output not recorded  /75   Pulse 63   Temp 97.8 °F (36.6 °C) (Oral)   Resp 16   Ht 1.626 m (5' 4\")   Wt 74.8 kg (165 lb)   SpO2 98%   BMI 28.32 kg/m²     Wt Readings from Last 3 Encounters:   08/12/24 74.8 kg

## 2024-08-14 NOTE — PROGRESS NOTES
[facility]    8/14/2024 12:21 PM        Name: Jessica Reyes .              Admitted: 8/12/2024  Primary Care Provider: Mehran Stein MD (Tel: [phone])        Subjective:    Patient was having diarrhea and some nausea nausea and blood in stool no fever chills or chest pain    Reviewed interval ancillary notes    Current Medications  0.9 % sodium chloride infusion, PRN  0.9 % sodium chloride infusion, PRN  tacrolimus (proGRAF) capsule 1 mg, BID  aspirin EC tablet 81 mg, Daily  allopurinol (ZYLOPRIM) tablet 100 mg, Daily  famotidine (PEPCID) tablet 20 mg, Daily  levothyroxine (SYNTHROID) tablet 112 mcg, Daily  metoprolol tartrate (LOPRESSOR) tablet 200 mg, BID  atorvastatin (LIPITOR) tablet 20 mg, Nightly  sucralfate (CARAFATE) tablet 1 g, 4 times per day  sodium chloride flush 0.9 % injection 5-40 mL, 2 times per day  sodium chloride flush 0.9 % injection 5-40 mL, PRN  0.9 % sodium chloride infusion, PRN  [Held by provider] enoxaparin Sodium (LOVENOX) injection 30 mg, Daily  polyethylene glycol (GLYCOLAX) packet 17 g, Daily PRN  acetaminophen (TYLENOL) tablet 650 mg, Q6H PRN   Or  acetaminophen (TYLENOL) suppository 650 mg, Q6H PRN  ciprofloxacin (CIPRO) IVPB 400 mg, Q24H  promethazine (PHENERGAN) 12.5 mg in sodium chloride 0.9 % 50 mL IVPB, Q6H PRN        Objective:  /68   Pulse 61   Temp 97.5 °F (36.4 °C) (Oral)   Resp 16   Ht 1.626 m (5' 4\")   Wt 74.8 kg (165 lb)   SpO2 99%   BMI 28.32 kg/m²     Intake/Output Summary (Last 24 hours) at 8/14/2024 1221  Last data filed at 8/13/2024 1611  Gross per 24 hour   Intake 440 ml   Output --   Net 440 ml      Wt Readings from Last 3 Encounters:   08/12/24 74.8 kg (165 lb)   07/30/24 78.5 kg (173 lb)   06/11/24 83 kg (183 lb)       General appearance:  Appears comfortable. AAOx3  HEENT: atraumatic, Pupils equal, muscous membranes moist, no masses

## 2024-08-14 NOTE — CONSULTS
[facility]    Consult Note      Requesting Physician:  The hospitalist    CHIEF COMPLAINT:  Diarrhea      HISTORY OF PRESENT ILLNESS:    Ms. Reyes  is a 73 y.o. female we are seeing in consultation for pancytopenia.  She had kidney transplant in early 2013.  She has been on tacrolimus.  She was found to have worsening anemia and leukopenia.  She has mild thrombocytopenia.          ICD-10-CM    1. Acute renal failure superimposed on chronic kidney disease, unspecified acute renal failure type, unspecified CKD stage (HCC)  N17.9     N18.9       2. Diarrhea, unspecified type  R19.7       3. Nausea  R11.0       4. Abdominal pain, epigastric  R10.13              Past Medical History:  Past Medical History:   Diagnosis Date    Clostridium difficile diarrhea     history of    Esophageal reflux     Hyperlipidemia     Hypertension     Nephropathy, membranous     Nephrotic syndrome     Thyroid disease     hypotyhroidism       Past Surgical History:  Past Surgical History:   Procedure Laterality Date    ABDOMEN SURGERY      APPENDECTOMY      ELBOW ARTHROSCOPY      bilaterally    KIDNEY TRANSPLANT  2013    KNEE ARTHROSCOPY      bilaterally    TONSILLECTOMY         Current Medications:  Current Facility-Administered Medications   Medication Dose Route Frequency Provider Last Rate Last Admin    lactated ringers IV soln infusion   IntraVENous Continuous Sudha Roque  mL/hr at 08/13/24 1146 New Bag at 08/13/24 1146    tacrolimus (proGRAF) capsule 1 mg  1 mg Oral BID Sudha Roque MD   1 mg at 08/13/24 2004    aspirin EC tablet 81 mg  81 mg Oral Daily Ra Boothe MD   81 mg at 08/13/24 0923    allopurinol (ZYLOPRIM) tablet 100 mg  100 mg Oral Daily Ra Boothe MD   100 mg at 08/13/24 0923    famotidine (PEPCID) tablet 20 mg  20 mg Oral Daily Ra Boothe MD    20 mg at 08/13/24 0923    levothyroxine (SYNTHROID) tablet 112 mcg  112 mcg Oral Daily Ra Boothe MD   112 mcg at 08/13/24 0547    metoprolol tartrate (LOPRESSOR) tablet 200 mg  200 mg Oral BID Ra Boothe MD   200 mg at 08/13/24 2004    atorvastatin (LIPITOR) tablet 20 mg  20 mg Oral Nightly Ra Boothe MD   20 mg at 08/13/24 2004    sucralfate (CARAFATE) tablet 1 g  1 g Oral 4 times per day Ra Boothe MD   1 g at 08/13/24 1814    sodium chloride flush 0.9 % injection 5-40 mL  5-40 mL IntraVENous 2 times per day Ra Boothe MD   10 mL at 08/13/24 2004    sodium chloride flush 0.9 % injection 5-40 mL  5-40 mL IntraVENous PRN Ra Boothe MD        0.9 % sodium chloride infusion   IntraVENous PRN Ra Boothe MD        [Held by provider] enoxaparin Sodium (LOVENOX) injection 30 mg  30 mg SubCUTAneous Daily Ra Boothe MD        polyethylene glycol (GLYCOLAX) packet 17 g  17 g Oral Daily PRN Ra Boothe MD        acetaminophen (TYLENOL) tablet 650 mg  650 mg Oral Q6H PRN Ra Boothe MD        Or    acetaminophen (TYLENOL) suppository 650 mg  650 mg Rectal Q6H PRN Ra Boothe MD        ciprofloxacin (CIPRO) IVPB 400 mg  400 mg IntraVENous Q24H Ra Boothe MD   Stopped at 08/13/24 2000    promethazine (PHENERGAN) 12.5 mg in sodium chloride 0.9 % 50 mL IVPB  12.5 mg IntraVENous Q6H PRN Akil Lee MD           Allergies:  Allergies   Allergen Reactions    Cefuroxime Axetil Swelling     Lips to swell       Social History:  Social History     Socioeconomic History    Marital status: Single     Spouse name: Not on file    Number of children: 0    Years of education: 12    Highest education level: Not on file   Occupational History    Occupation: employed [employer]   Tobacco Use    Smoking status: Never    Smokeless tobacco: Never   Vaping Use    Vaping status: Never Used   Substance and Sexual Activity    Alcohol use: No    Drug use:

## 2024-08-14 NOTE — PRE SEDATION
Sedation Pre-Procedure Note    Patient Name: Jessica Reyes   YOB: 1950  Room/Bed: 5TN-5585/5585-01  Medical Record Number: [id number]  Date: 8/14/2024   Time: 10:48 AM       Indication:  bone marrow biopsy.    Consent: I have discussed with the patient and/or the patient representative the indication, alternatives, and the possible risks and/or complications of the planned procedure and the anesthesia methods. The patient and/or patient representative appear to understand and agree to proceed.    Vital Signs:   Vitals:    08/14/24 0815   BP: 119/69   Pulse: 66   Resp: 16   Temp: 97.3 °F (36.3 °C)   SpO2: 95%       Past Medical History:   has a past medical history of Clostridium difficile diarrhea, Esophageal reflux, Hyperlipidemia, Hypertension, Nephropathy, membranous, Nephrotic syndrome, and Thyroid disease.    Past Surgical History:   has a past surgical history that includes Abdomen surgery; Tonsillectomy; Knee arthroscopy; Elbow arthroscopy; Kidney transplant (2013); and Appendectomy.    Medications:   Scheduled Meds:    tacrolimus  1 mg Oral BID    aspirin  81 mg Oral Daily    allopurinol  100 mg Oral Daily    famotidine  20 mg Oral Daily    levothyroxine  112 mcg Oral Daily    metoprolol  200 mg Oral BID    atorvastatin  20 mg Oral Nightly    sucralfate  1 g Oral 4 times per day    sodium chloride flush  5-40 mL IntraVENous 2 times per day    [Held by provider] enoxaparin  30 mg SubCUTAneous Daily    ciprofloxacin  400 mg IntraVENous Q24H     Continuous Infusions:    sodium chloride      lactated ringers IV soln 100 mL/hr at 08/13/24 1146    sodium chloride       PRN Meds: sodium chloride, sodium chloride flush, sodium chloride, polyethylene glycol, acetaminophen **OR** acetaminophen, promethazine (PHENERGAN) 12.5 mg in sodium chloride 0.9 % 50 mL IVPB  Home Meds:   Prior to Admission medications    Medication Sig Start Date End Date Taking? Authorizing Provider   hydrALAZINE (APRESOLINE) 25

## 2024-08-15 ENCOUNTER — APPOINTMENT (OUTPATIENT)
Dept: ULTRASOUND IMAGING | Age: 74
DRG: 987 | End: 2024-08-15
Payer: MEDICARE

## 2024-08-15 PROBLEM — D61.818 PANCYTOPENIA (HCC): Status: ACTIVE | Noted: 2024-08-15

## 2024-08-15 PROBLEM — R19.7 DIARRHEA: Status: ACTIVE | Noted: 2024-08-15

## 2024-08-15 PROBLEM — E78.00 HIGH CHOLESTEROL: Status: ACTIVE | Noted: 2024-08-15

## 2024-08-15 PROBLEM — E03.9 HYPOTHYROID: Status: ACTIVE | Noted: 2024-08-15

## 2024-08-15 LAB
ANION GAP SERPL CALCULATED.3IONS-SCNC: 10 MMOL/L (ref 3–16)
BASOPHILS # BLD: 0 K/UL (ref 0–0.2)
BASOPHILS NFR BLD: 0 %
BUN SERPL-MCNC: 71 MG/DL (ref 7–20)
CALCIUM SERPL-MCNC: 8.3 MG/DL (ref 8.3–10.6)
CHLORIDE SERPL-SCNC: 114 MMOL/L (ref 99–110)
CO2 SERPL-SCNC: 19 MMOL/L (ref 21–32)
CREAT SERPL-MCNC: 3.8 MG/DL (ref 0.6–1.2)
CRYPTOSP AG STL QL IA: NORMAL
DEPRECATED RDW RBC AUTO: 14.9 % (ref 12.4–15.4)
E HISTOLYT AG STL QL IA: NORMAL
EOSINOPHIL # BLD: 0.1 K/UL (ref 0–0.6)
EOSINOPHIL NFR BLD: 4 %
G LAMBLIA AG STL QL IA: NORMAL
GFR SERPLBLD CREATININE-BSD FMLA CKD-EPI: 12 ML/MIN/{1.73_M2}
GLUCOSE SERPL-MCNC: 130 MG/DL (ref 70–99)
HCT VFR BLD AUTO: 21.8 % (ref 36–48)
HGB BLD-MCNC: 7.3 G/DL (ref 12–16)
LYMPHOCYTES # BLD: 0.4 K/UL (ref 1–5.1)
LYMPHOCYTES NFR BLD: 31 %
MCH RBC QN AUTO: 27.3 PG (ref 26–34)
MCHC RBC AUTO-ENTMCNC: 33.4 G/DL (ref 31–36)
MCV RBC AUTO: 81.6 FL (ref 80–100)
MONOCYTES # BLD: 0 K/UL (ref 0–1.3)
MONOCYTES NFR BLD: 0 %
NEUTROPHILS # BLD: 0.8 K/UL (ref 1.7–7.7)
NEUTROPHILS NFR BLD: 65 %
PATH INTERP BLD-IMP: NO
PLATELET # BLD AUTO: 116 K/UL (ref 135–450)
PLATELET BLD QL SMEAR: ABNORMAL
PMV BLD AUTO: 8.9 FL (ref 5–10.5)
POLYCHROMASIA BLD QL SMEAR: ABNORMAL
POTASSIUM SERPL-SCNC: 3.9 MMOL/L (ref 3.5–5.1)
RBC # BLD AUTO: 2.67 M/UL (ref 4–5.2)
SLIDE REVIEW: ABNORMAL
SODIUM SERPL-SCNC: 143 MMOL/L (ref 136–145)
TACROLIMUS BLOOD: 11.7 NG/ML (ref 5–20)
WBC # BLD AUTO: 1.3 K/UL (ref 4–11)

## 2024-08-15 PROCEDURE — 80197 ASSAY OF TACROLIMUS: CPT

## 2024-08-15 PROCEDURE — 80048 BASIC METABOLIC PNL TOTAL CA: CPT

## 2024-08-15 PROCEDURE — 0QB23ZX EXCISION OF RIGHT PELVIC BONE, PERCUTANEOUS APPROACH, DIAGNOSTIC: ICD-10-PCS | Performed by: RADIOLOGY

## 2024-08-15 PROCEDURE — 36415 COLL VENOUS BLD VENIPUNCTURE: CPT

## 2024-08-15 PROCEDURE — 86747 PARVOVIRUS ANTIBODY: CPT

## 2024-08-15 PROCEDURE — 6360000002 HC RX W HCPCS: Performed by: INTERNAL MEDICINE

## 2024-08-15 PROCEDURE — 97535 SELF CARE MNGMENT TRAINING: CPT

## 2024-08-15 PROCEDURE — P9047 ALBUMIN (HUMAN), 25%, 50ML: HCPCS | Performed by: INTERNAL MEDICINE

## 2024-08-15 PROCEDURE — 1200000000 HC SEMI PRIVATE

## 2024-08-15 PROCEDURE — 2580000003 HC RX 258: Performed by: INTERNAL MEDICINE

## 2024-08-15 PROCEDURE — 85025 COMPLETE CBC W/AUTO DIFF WBC: CPT

## 2024-08-15 PROCEDURE — 86757 RICKETTSIA ANTIBODY: CPT

## 2024-08-15 PROCEDURE — 76770 US EXAM ABDO BACK WALL COMP: CPT

## 2024-08-15 PROCEDURE — 6370000000 HC RX 637 (ALT 250 FOR IP): Performed by: INTERNAL MEDICINE

## 2024-08-15 PROCEDURE — 079T3ZX DRAINAGE OF BONE MARROW, PERCUTANEOUS APPROACH, DIAGNOSTIC: ICD-10-PCS | Performed by: RADIOLOGY

## 2024-08-15 RX ORDER — ALBUMIN (HUMAN) 12.5 G/50ML
12.5 SOLUTION INTRAVENOUS EVERY 8 HOURS
Status: COMPLETED | OUTPATIENT
Start: 2024-08-15 | End: 2024-08-17

## 2024-08-15 RX ORDER — 0.9 % SODIUM CHLORIDE 0.9 %
500 INTRAVENOUS SOLUTION INTRAVENOUS PRN
Status: DISCONTINUED | OUTPATIENT
Start: 2024-08-15 | End: 2024-08-21 | Stop reason: HOSPADM

## 2024-08-15 RX ORDER — ONDANSETRON 2 MG/ML
4 INJECTION INTRAMUSCULAR; INTRAVENOUS EVERY 6 HOURS PRN
Status: DISCONTINUED | OUTPATIENT
Start: 2024-08-15 | End: 2024-08-21 | Stop reason: HOSPADM

## 2024-08-15 RX ORDER — HYDRALAZINE HYDROCHLORIDE 20 MG/ML
10 INJECTION INTRAMUSCULAR; INTRAVENOUS EVERY 6 HOURS PRN
Status: DISCONTINUED | OUTPATIENT
Start: 2024-08-15 | End: 2024-08-21 | Stop reason: HOSPADM

## 2024-08-15 RX ADMIN — TACROLIMUS 1 MG: 0.5 CAPSULE ORAL at 09:10

## 2024-08-15 RX ADMIN — METOPROLOL TARTRATE 100 MG: 50 TABLET, FILM COATED ORAL at 10:01

## 2024-08-15 RX ADMIN — LEVOTHYROXINE SODIUM 112 MCG: 0.11 TABLET ORAL at 05:04

## 2024-08-15 RX ADMIN — TACROLIMUS 1 MG: 0.5 CAPSULE ORAL at 21:11

## 2024-08-15 RX ADMIN — SUCRALFATE 1 G: 1 TABLET ORAL at 05:04

## 2024-08-15 RX ADMIN — SODIUM CHLORIDE, PRESERVATIVE FREE 10 ML: 5 INJECTION INTRAVENOUS at 09:13

## 2024-08-15 RX ADMIN — ONDANSETRON 4 MG: 2 INJECTION INTRAMUSCULAR; INTRAVENOUS at 10:52

## 2024-08-15 RX ADMIN — ALLOPURINOL 100 MG: 100 TABLET ORAL at 09:11

## 2024-08-15 RX ADMIN — ATORVASTATIN CALCIUM 20 MG: 20 TABLET, FILM COATED ORAL at 21:11

## 2024-08-15 RX ADMIN — SUCRALFATE 1 G: 1 TABLET ORAL at 13:32

## 2024-08-15 RX ADMIN — CIPROFLOXACIN 400 MG: 400 INJECTION, SOLUTION INTRAVENOUS at 21:18

## 2024-08-15 RX ADMIN — FAMOTIDINE 20 MG: 20 TABLET ORAL at 09:11

## 2024-08-15 RX ADMIN — SODIUM CHLORIDE, PRESERVATIVE FREE 10 ML: 5 INJECTION INTRAVENOUS at 21:11

## 2024-08-15 RX ADMIN — SODIUM CHLORIDE, POTASSIUM CHLORIDE, SODIUM LACTATE AND CALCIUM CHLORIDE: 600; 310; 30; 20 INJECTION, SOLUTION INTRAVENOUS at 05:07

## 2024-08-15 RX ADMIN — ALBUMIN (HUMAN) 12.5 G: 0.25 INJECTION, SOLUTION INTRAVENOUS at 17:19

## 2024-08-15 RX ADMIN — ASPIRIN 81 MG: 81 TABLET, COATED ORAL at 09:11

## 2024-08-15 RX ADMIN — ACETAMINOPHEN 650 MG: 325 TABLET ORAL at 21:11

## 2024-08-15 RX ADMIN — METOPROLOL TARTRATE 200 MG: 50 TABLET, FILM COATED ORAL at 21:11

## 2024-08-15 RX ADMIN — SUCRALFATE 1 G: 1 TABLET ORAL at 18:31

## 2024-08-15 ASSESSMENT — PAIN SCALES - GENERAL
PAINLEVEL_OUTOF10: 0

## 2024-08-15 NOTE — PROGRESS NOTES
Shift assessment completed. Routine vitals obtained. Scheduled medications given. Patient is awake, alert and oriented. Patient is up in room with OT. Call light within reach. No further needs expressed.

## 2024-08-15 NOTE — PROGRESS NOTES
Gastroenterology Progress Note      Jessica Reyes is a 73 y.o. female patient.  1. Acute renal failure superimposed on chronic kidney disease, unspecified acute renal failure type, unspecified CKD stage (HCC)    2. Diarrhea, unspecified type    3. Nausea    4. Abdominal pain, epigastric        SUBJECTIVE: Having 4-5 watery bowel movements daily.  They can be small or larger volume.  Did notice increased stool volume after p.o. diet was resumed.      Physical    VITALS:  /73   Pulse 74   Temp 97.6 °F (36.4 °C) (Oral)   Resp 16   Ht 1.626 m (5' 4\")   Wt 74.8 kg (165 lb)   SpO2 96%   BMI 28.32 kg/m²   TEMPERATURE:  Current - Temp: 97.6 °F (36.4 °C); Max - Temp  Av.7 °F (36.5 °C)  Min: 97.5 °F (36.4 °C)  Max: 97.8 °F (36.6 °C)    NAD  RRR   Lungs CTA Bilaterally, normal effort  Abdomen soft, ND, NT, no HSM, Bowel sounds normal       Data    Data Review:    Recent Labs     24  0524  0824  0518 08/15/24  0618   WBC 1.9*  --  1.6* 1.3*   HGB 7.1* 7.0* 6.6* 7.3*   HCT 21.1* 22.0* 20.7* 21.8*   MCV 79.1*  --  80.6 81.6   *  --  120* 116*     Recent Labs     24  0524  0518 08/15/24  0618    140 143   K 4.1 4.2 3.9    108 114*   CO2 15* 19* 19*   BUN 84* 75* 71*   CREATININE 2.9* 3.1* 3.8*     No results for input(s): \"AST\", \"ALT\", \"BILIDIR\", \"BILITOT\", \"ALKPHOS\" in the last 72 hours.    Invalid input(s): \"ALB\"    No results for input(s): \"LIPASE\", \"AMYLASE\" in the last 72 hours.    No results for input(s): \"PROTIME\", \"INR\" in the last 72 hours.  Invalid input(s): \"PTT\"         ASSESSMENT / PLAN      Acute diarrhea - began a week and a half prior to admission.  Was having about 10 large-volume diarrheal stools at home the day prior to admission.  CT nonacute.  Neg C.diff, EIA, and GI bacterial pathogens.  Reports diarrhea improving/volume improving but not resolving.  - follow up CMV PCR and parvovirus   -Discussed bowel prep and

## 2024-08-15 NOTE — FLOWSHEET NOTE
Post procedure chart reviewed.  Please call radiology RN with any questions/concerns r/t bone marrow bx on 8/14/24   Yes

## 2024-08-15 NOTE — PROGRESS NOTES
function stable.  ESRD thought to be from HTN.   Pyuria-on Cipro.  Urine culture negative.  Anemia with leukopenia and thrombocytopenia-bone marrow biopsy done yesterday.  Hematology following.    High risk.     Sudha Roque MD

## 2024-08-15 NOTE — PROGRESS NOTES
Physical Therapy  Jessica Reyes    Attempted to see pt for PT treatment. Patient currently off the floor for testing. Will hold therapy at this time and will follow up with pt tomorrow as schedule permits. Thanks, Antonia Padilla, PT, DPT [id number]

## 2024-08-15 NOTE — PROGRESS NOTES
[facility] - Inpatient Rehabilitation Department   Phone: [phone]    Occupational Therapy    [] Initial Evaluation            [x] Daily Treatment Note         [] Discharge Summary      Patient: Jessica Reyes   : 1950   MRN: [id number]   Date of Service:  8/15/2024    Admitting Diagnosis:  IMMANUEL (acute kidney injury) (HCC)  Current Admission Summary: Per EMR: Jessica Reyes is a 73 y.o. female who presents to the emergency department complaining of diarrhea for 1 week.  She does report some nausea and occasional epigastric discomfort.  She describes the diarrhea as watery.  She denies recent travel, surgery, hospitalization, fever, chills, sick exposures, recent antibiotic use, ingestion of seafood,  or spicy food prior to onset of symptoms.  Today, she feels weak and fatigued.  She thinks that she is dehydrated.  Patient does have history of chronic kidney disease and sees Dr. Duffy, nephrologist.   Past Medical History:  has a past medical history of Clostridium difficile diarrhea, Esophageal reflux, Hyperlipidemia, Hypertension, Nephropathy, membranous, Nephrotic syndrome, and Thyroid disease.  Past Surgical History:  has a past surgical history that includes Abdomen surgery; Tonsillectomy; Knee arthroscopy; Elbow arthroscopy; Kidney transplant (); and Appendectomy.    Discharge Recommendations: .Jessica Reyes scored a 22/24 on the AM-PAC ADL Inpatient form. Current research shows that an AM-PAC score of 18 or greater is typically associated with a discharge to the patient's home setting. Based on the patient's AM-PAC score, and their current ADL deficits, it is recommended that the patient have 2-3 sessions per week of Occupational Therapy at d/c to increase the patient's independence.  At this time, this patient demonstrates the endurance and safety to discharge home with home services and a follow up treatment frequency of 2-3x/wk.   Please see assessment section for  further patient specific details.    HOME HEALTH CARE: LEVEL 1 STANDARD    - Initial home health evaluation to occur within 24-48 hours, in patient home   - Therapy to evaluate with goal of regaining prior level of functioning   - Therapy to evaluate if patient has Home Health Aide needs for personal care    If patient discharges prior to next session this note will serve as a discharge summary.  Please see below for the latest assessment towards goals.      Note: Pt not interested in any HH therapy after discharge.    .  DME Required For Discharge: rolling walker    Precautions/Restrictions: medium fall risk  Weight Bearing Restrictions: no restrictions  [] Right Upper Extremity  [] Left Upper Extremity [] Right Lower Extremity  [] Left Lower Extremity     Required Braces/Orthotics: no braces required   [] Right  [] Left  Positional Restrictions:no positional restrictions    Pre-Admission Information   Lives With: alone    Type of Home: house--3 levels. Full flight to basement with B hand rails. Pt does not use 2nd level.   Home Layout: laundry in basement  Home Access:  5 step to enter with handrail.  Handrails are located on B side.  Bathroom Layout: tub only  Bathroom Equipment:  no prior equipment.   Toilet Height: elevated height  Home Equipment: single point cane  Transfer Assistance: Independent without use of device  Ambulation Assistance:Independent without use of device  ADL Assistance: independent with all ADL's  IADL Assistance: independent with homemaking tasks  Active :        [x] Yes  [] No  Hand Dominance: [] Left  [x] Right  Current Employment: full time employment.  Occupation:  --plans to retire soon  Hobbies: reading.  Recent Falls: denies recent falls.     Pt reports she has not had the energy to go downstairs to do her laundry in 5 days due to feeling ill and weak.     Observation:   General Observation:  RA        Subjective  General: Pt in semi-ventura's position in bed upon

## 2024-08-15 NOTE — PROGRESS NOTES
ONCOLOGY HEMATOLOGY CARE PROGRESS NOTE      SUBJECTIVE:  No new complaints.  No diarrhea at present.    ROS:     Constitutional:  No weight loss, No fever, No chills, No night sweats.   Eyes:  No impairment or change in vision  ENT / Mouth:  No pain, abnormal ulceration, bleeding, nasal drip or change in voice or hearing  Cardiovascular:  No chest pain, palpitations, new edema, or calf discomfort  Respiratory:  No pain, hemoptysis, change to breathing  Breast:  No pain, discharge, change in appearance or texture  Gastrointestinal: Persistent diarrhea.  Urinary:  No pain, bleeding or change in continence  Genitalia: No pain, bleeding or discharge  Musculoskeletal:  No redness, pain, edema or weakness  Skin:  No pruritus, rash, change to nodules or lesions  Neurologic:  No discomfort, change in mental status, speech, sensory or motor activity  Psychiatric:  No change in concentration or change to affect or mood  Endocrine:  No hot flashes, increased thirst, or change to urine production  Hematologic: No petechiae, ecchymosis or bleeding  Lymphatic:  No lymphadenopathy or lymphedema  Allergy / Immunologic:  No eczema, hives, frequent or recurrent infections    OBJECTIVE        Physical    VITALS:  Patient Vitals for the past 24 hrs:   BP Temp Temp src Pulse Resp SpO2 Height   08/15/24 1105 107/73 97.6 °F (36.4 °C) Oral 74 16 96 % --   08/15/24 1057 -- -- -- -- -- -- 1.626 m (5' 4\")   08/15/24 0900 128/79 97.5 °F (36.4 °C) Oral 67 16 96 % --   08/15/24 0449 100/60 97.8 °F (36.6 °C) Oral 68 16 96 % --   08/14/24 1942 107/68 97.6 °F (36.4 °C) Oral 74 16 96 % --   08/14/24 1455 119/75 97.8 °F (36.6 °C) Oral 63 16 98 % --   08/14/24 1454 -- -- -- 60 16 96 % --   08/14/24 1445 126/77 97.8 °F (36.6 °C) -- 60 16 96 % --   08/14/24 1443 126/77 97.8 °F (36.6 °C) Oral 60 16 96 % --   08/14/24 1400 122/78 -- -- 60 16 -- --   08/14/24 1255 120/72 97.6 °F (36.4 °C) Oral 60 16 99 % --   08/14/24  08/15/24  0618 08/14/24  0518 08/13/24  0511 08/12/24  1212 07/26/24  1255    140 139 141 139   K 3.9 4.2 4.1 5.0 5.1   * 108 109 109 109   CO2 19* 19* 15* 9* 15*   GLUCOSE 130* 121* 167* 194* 129*   BUN 71* 75* 84* 93* 69*   CREATININE 3.8* 3.1* 2.9* 3.4* 2.7*   LABGLOM 12* 15* 17* 14* 18*   CALCIUM 8.3 8.4 8.6 9.8 9.1   AGRATIO  --   --   --  1.4  --    BILITOT  --   --   --  0.3  --    ALKPHOS  --   --   --  76  --    ALT  --   --   --  10  --    AST  --   --   --  21  --    MG  --   --   --  2.20 2.20       Lab Results   Component Value Date    CALCIUM 8.3 08/15/2024    PHOS 4.5 07/26/2024       LDH:No results for input(s): \"LDH\" in the last 720 hours.    Radiology Review:  CT ABDOMEN PELVIS WO CONTRAST Additional Contrast? None  Narrative: EXAMINATION:  CT OF THE ABDOMEN AND PELVIS WITHOUT CONTRAST 8/12/2024 1:08 pm    TECHNIQUE:  CT of the abdomen and pelvis was performed without the administration of  intravenous contrast. Multiplanar reformatted images are provided for review.  Automated exposure control, iterative reconstruction, and/or weight based  adjustment of the mA/kV was utilized to reduce the radiation dose to as low  as reasonably achievable.    COMPARISON:  None.    HISTORY:  ORDERING SYSTEM PROVIDED HISTORY: abd pain  TECHNOLOGIST PROVIDED HISTORY:  Reason for exam:->abd pain  Additional Contrast?->None  Decision Support Exception - unselect if not a suspected or confirmed  emergency medical condition->Emergency Medical Condition (MA)  Reason for Exam: abd pain    FINDINGS:  Lower Chest: Subtle area of increased attenuation in the anterior left mid  lung zone likely representing fibrosis or scarring.  No worrisome nodule, no  pneumothorax or infiltrate.    Organs:    Liver: The liver is slightly hypoplastic with slight lobulation.  This may  reflect mild cirrhosis.  There is no significant varicosities.    Gallbladder: Normal    Pancreas: Pancreas is atrophic and fatty

## 2024-08-15 NOTE — PROGRESS NOTES
Nutrition Note    RECOMMENDATIONS  PO Diet: Continue current diet  ONS: Pt denied at this time  Nursing: RD ordered wt. Obtain standing or bed wt of pt and document wt method    ASSESSMENT   Pt triggered for positive nutrition screen. Upon visiting, reported decreased po intake for at least over a week prior to current admission r/t diarrhea which states is now resolved and 34 lb unintentional wt loss in 2 weeks. Unsure if only wt documented for current admission (165 lb) is accurate, unable to assess for wt change at this time. Denied need for ONS. RD will monitor for adequate po intake.     Malnutrition Status: Insufficient data  Acute Illness    NUTRITION DIAGNOSIS   Inadequate oral intake related to altered GI function as evidenced by poor intake prior to admission, diarrhea    Goals: PO intake 50% or greater, by next RD assessment     NUTRITION RELATED FINDINGS  Objective: LR at 100 mL/hr. Labs reviewed. LBM 8/14. No edema noted.  Wounds: None    CURRENT NUTRITION THERAPIES  ADULT DIET; Regular     PO Intake: Unable to assess   PO Supplement Intake:None Ordered    ANTHROPOMETRICS  Current Height: 162.6 cm (5' 4\")  Current Weight - Scale: 74.8 kg (165 lb)    Ideal Body Weight (IBW): 120 lbs  (55 kg)        BMI: 28.3    COMPARATIVE STANDARDS  Total Energy Requirements (kcals/day): 8940-2346     Protein (g):  55-65       Fluid (mL/day):  7824-2885    EDUCATION  Education not indicated     The patient will be monitored per nutrition standards of care. Consult dietitian if additional nutrition interventions are needed prior to RD reassessment.     Pricila Quiroz MS, RD, LD    Contact: [phone]

## 2024-08-15 NOTE — PROGRESS NOTES
Progress Note - Dr. Stein - Internal Medicine  PCP: Mehran Stein MD [address] [facility] [address] [phone]    Hospital Day: 3  Code Status: Full Code  Current Diet: ADULT DIET; Regular        CC: follow up on medical issues    Subjective:   Jessica Reyes is a 73 y.o. female.    Pt seen and examined  Chart reviewed since last visit, labs and imaging below      No BM yet today  Still pancytopenic  BMBx pending, parvo pending      Review of Systems: (1 system for EPF, 2-9 for detailed, 10+ for comprehensive)  Constitutional: Negative for chills and fever.     HENT: Negative for dental problem, nosebleeds and rhinorrhea.      Eyes: Negative for photophobia and visual disturbance.     Respiratory: Negative for cough, chest tightness and shortness of breath.      Cardiovascular: Negative for chest pain and leg swelling.     Gastrointestinal: positive for diarrhea, negative for nausea and vomiting.     Endocrine: Negative for polydipsia and polyphagia.     Genitourinary: Negative for frequency, hematuria and urgency.     Musculoskeletal: Negative for back pain and myalgias.     Skin: Negative for rash.     Allergic/Immunologic: Negative for food allergies.     Neurological: Negative for dizziness, seizures, syncope and facial asymmetry.     Hematological: Negative for adenopathy.     Psychiatric/Behavioral: Negative for dysphoric mood. The patient is not nervous/anxious.        I have reviewed the patient's medical and social history in detail and updated the computerized patient record.  To recap: She  has a past medical history of Clostridium difficile diarrhea, Esophageal reflux, Hyperlipidemia, Hypertension, Nephropathy, membranous, Nephrotic syndrome, and Thyroid disease.. She  has a past surgical history that includes Abdomen surgery; Tonsillectomy; Knee arthroscopy; Elbow arthroscopy; Kidney transplant (2013); and Appendectomy.. She  reports that she has never smoked. She has never used smokeless  slightly hypoplastic with slight lobulation.  This may reflect mild cirrhosis.  There is no significant varicosities. Gallbladder: Normal Pancreas: Pancreas is atrophic and fatty replaced. Adrenal glands: Both adrenal glands are normal. Kidneys: Both native kidneys are atrophic.  Right pelvic kidney is grossly normal in attenuation nominal worrisome mass identified. Spleen: The spleen is upper limits of normal but otherwise normal. GI/Bowel: Stomach: Moderate hiatal hernia. Small bowel: Unremarkable. Colon: Unremarkable. Pelvis: The bladder is normal.  The uterus is atrophic appropriate for age. Peritoneum/Retroperitoneum: No retroperitoneal adenopathy, free fluid or free air. Bones/Soft Tissues: Moderate degenerative change of the spine.  No lytic or blastic lesion.  Grade 2 L5 on S1 anterior spondylolisthesis.     No acute abdominal or pelvic finding. Atrophic right and left native kidneys with unremarkable right pelvic kidney. Hiatal hernia. Slightly hypoplastic liver may reflect mild cirrhosis in the correct clinical and laboratory setting.       Lab Results   Component Value Date/Time    GLUCOSE 130 08/15/2024 06:18 AM     No results found for: \"POCGLU\"  /60   Pulse 68   Temp 97.8 °F (36.6 °C) (Oral)   Resp 16   Ht 1.626 m (5' 4\")   Wt 74.8 kg (165 lb)   SpO2 96%   BMI 28.32 kg/m²     Assessment and Plan:  Principal Problem:    IMMANUEL (acute kidney injury) (HCC) -Established problem. Stable.  Creat 3.8  Plan:  cont mgmt per renal  Active Problems:    Pancytopenia (HCC) -Established problem. Stable.  Counts remain low  Plan: BMBx pending. Per Dr Swenson    High cholesterol -Established problem. Stable.    Plan: Continue present orders/plan.     Hypothyroid  Plan: cont on replacement    Diarrhea  Plan: GI on board. Awaiting CMV/Parvo studies      Case discussed with: GI  Tests ordered/reviewed: cbc, bmp, FSBS, Ca            (Please note that portions of this note were completed with a voice recognition

## 2024-08-16 PROBLEM — N18.9 ACUTE RENAL FAILURE SUPERIMPOSED ON CHRONIC KIDNEY DISEASE (HCC): Status: ACTIVE | Noted: 2024-08-12

## 2024-08-16 PROBLEM — I10 ESSENTIAL HYPERTENSION: Status: ACTIVE | Noted: 2024-08-16

## 2024-08-16 PROBLEM — Z87.441 HISTORY OF NEPHROTIC SYNDROME: Status: ACTIVE | Noted: 2024-08-16

## 2024-08-16 PROBLEM — B25.9 CYTOMEGALOVIRUS (CMV) VIREMIA (HCC): Status: ACTIVE | Noted: 2024-08-16

## 2024-08-16 PROBLEM — E83.42 HYPOMAGNESEMIA: Status: ACTIVE | Noted: 2024-08-16

## 2024-08-16 PROBLEM — E66.3 OVERWEIGHT (BMI 25.0-29.9): Status: ACTIVE | Noted: 2024-08-16

## 2024-08-16 PROBLEM — E78.2 MIXED HYPERLIPIDEMIA: Status: ACTIVE | Noted: 2024-08-16

## 2024-08-16 PROBLEM — Z94.0 HISTORY OF RENAL TRANSPLANT: Status: ACTIVE | Noted: 2024-08-16

## 2024-08-16 PROBLEM — Z86.19 HISTORY OF CLOSTRIDIOIDES DIFFICILE COLITIS: Status: ACTIVE | Noted: 2024-08-16

## 2024-08-16 PROBLEM — E83.51 HYPOCALCEMIA: Status: ACTIVE | Noted: 2024-08-16

## 2024-08-16 PROBLEM — R11.0 NAUSEA: Status: ACTIVE | Noted: 2024-08-16

## 2024-08-16 PROBLEM — R10.13 ABDOMINAL PAIN, EPIGASTRIC: Status: ACTIVE | Noted: 2024-08-16

## 2024-08-16 LAB
ANION GAP SERPL CALCULATED.3IONS-SCNC: 11 MMOL/L (ref 3–16)
BASOPHILS # BLD: 0 K/UL (ref 0–0.2)
BASOPHILS NFR BLD: 0.3 %
BLOOD BANK DISPENSE STATUS: NORMAL
BLOOD BANK PRODUCT CODE: NORMAL
BPU ID: NORMAL
BUN SERPL-MCNC: 69 MG/DL (ref 7–20)
CALCIUM SERPL-MCNC: 8.1 MG/DL (ref 8.3–10.6)
CHLORIDE SERPL-SCNC: 113 MMOL/L (ref 99–110)
CMV DNA SERPL NAA+PROBE-ACNC: 3140 IU/ML
CMV DNA SERPL NAA+PROBE-LOG IU: 3.5 LOG IU/ML
CMV DNA SERPL QL NAA+PROBE: DETECTED
CO2 SERPL-SCNC: 16 MMOL/L (ref 21–32)
CREAT SERPL-MCNC: 3.8 MG/DL (ref 0.6–1.2)
DEPRECATED RDW RBC AUTO: 14.8 % (ref 12.4–15.4)
DESCRIPTION BLOOD BANK: NORMAL
EOSINOPHIL # BLD: 0.1 K/UL (ref 0–0.6)
EOSINOPHIL NFR BLD: 4 %
GFR SERPLBLD CREATININE-BSD FMLA CKD-EPI: 12 ML/MIN/{1.73_M2}
GLUCOSE SERPL-MCNC: 118 MG/DL (ref 70–99)
HAPTOGLOB SERPL-MCNC: 195 MG/DL (ref 30–200)
HCT VFR BLD AUTO: 21.4 % (ref 36–48)
HCT VFR BLD AUTO: 25 % (ref 36–48)
HGB BLD-MCNC: 6.9 G/DL (ref 12–16)
HGB BLD-MCNC: 8.2 G/DL (ref 12–16)
LDH SERPL L TO P-CCNC: 131 U/L (ref 100–190)
LYMPHOCYTES # BLD: 0.3 K/UL (ref 1–5.1)
LYMPHOCYTES NFR BLD: 25.4 %
MAGNESIUM SERPL-MCNC: 1.7 MG/DL (ref 1.8–2.4)
MCH RBC QN AUTO: 26.7 PG (ref 26–34)
MCHC RBC AUTO-ENTMCNC: 32.1 G/DL (ref 31–36)
MCV RBC AUTO: 83.3 FL (ref 80–100)
MONOCYTES # BLD: 0.3 K/UL (ref 0–1.3)
MONOCYTES NFR BLD: 20.6 %
NEUTROPHILS # BLD: 0.6 K/UL (ref 1.7–7.7)
NEUTROPHILS NFR BLD: 49.7 %
PATH INTERP BLD-IMP: NORMAL
PATH INTERP BLD-IMP: YES
PLATELET # BLD AUTO: 114 K/UL (ref 135–450)
PMV BLD AUTO: 9 FL (ref 5–10.5)
POTASSIUM SERPL-SCNC: 3.5 MMOL/L (ref 3.5–5.1)
RBC # BLD AUTO: 2.57 M/UL (ref 4–5.2)
SODIUM SERPL-SCNC: 140 MMOL/L (ref 136–145)
TACROLIMUS BLOOD: 10.5 NG/ML (ref 5–20)
WBC # BLD AUTO: 1.3 K/UL (ref 4–11)

## 2024-08-16 PROCEDURE — 85018 HEMOGLOBIN: CPT

## 2024-08-16 PROCEDURE — 6360000002 HC RX W HCPCS: Performed by: INTERNAL MEDICINE

## 2024-08-16 PROCEDURE — 85025 COMPLETE CBC W/AUTO DIFF WBC: CPT

## 2024-08-16 PROCEDURE — 85014 HEMATOCRIT: CPT

## 2024-08-16 PROCEDURE — 87040 BLOOD CULTURE FOR BACTERIA: CPT

## 2024-08-16 PROCEDURE — 86702 HIV-2 ANTIBODY: CPT

## 2024-08-16 PROCEDURE — 99223 1ST HOSP IP/OBS HIGH 75: CPT | Performed by: INTERNAL MEDICINE

## 2024-08-16 PROCEDURE — 6370000000 HC RX 637 (ALT 250 FOR IP): Performed by: INTERNAL MEDICINE

## 2024-08-16 PROCEDURE — 2580000003 HC RX 258: Performed by: INTERNAL MEDICINE

## 2024-08-16 PROCEDURE — 36430 TRANSFUSION BLD/BLD COMPNT: CPT

## 2024-08-16 PROCEDURE — 36415 COLL VENOUS BLD VENIPUNCTURE: CPT

## 2024-08-16 PROCEDURE — 83615 LACTATE (LD) (LDH) ENZYME: CPT

## 2024-08-16 PROCEDURE — 87390 HIV-1 AG IA: CPT

## 2024-08-16 PROCEDURE — 83735 ASSAY OF MAGNESIUM: CPT

## 2024-08-16 PROCEDURE — P9047 ALBUMIN (HUMAN), 25%, 50ML: HCPCS | Performed by: INTERNAL MEDICINE

## 2024-08-16 PROCEDURE — 80197 ASSAY OF TACROLIMUS: CPT

## 2024-08-16 PROCEDURE — 86701 HIV-1ANTIBODY: CPT

## 2024-08-16 PROCEDURE — 80048 BASIC METABOLIC PNL TOTAL CA: CPT

## 2024-08-16 PROCEDURE — 83010 ASSAY OF HAPTOGLOBIN QUANT: CPT

## 2024-08-16 PROCEDURE — 1200000000 HC SEMI PRIVATE

## 2024-08-16 RX ORDER — SODIUM CHLORIDE 9 MG/ML
INJECTION, SOLUTION INTRAVENOUS PRN
Status: COMPLETED | OUTPATIENT
Start: 2024-08-16 | End: 2024-08-17

## 2024-08-16 RX ORDER — TACROLIMUS 0.5 MG/1
0.5 CAPSULE ORAL 2 TIMES DAILY
Status: DISCONTINUED | OUTPATIENT
Start: 2024-08-16 | End: 2024-08-21 | Stop reason: HOSPADM

## 2024-08-16 RX ADMIN — SODIUM CHLORIDE, PRESERVATIVE FREE 10 ML: 5 INJECTION INTRAVENOUS at 08:31

## 2024-08-16 RX ADMIN — FAMOTIDINE 20 MG: 20 TABLET ORAL at 08:30

## 2024-08-16 RX ADMIN — SUCRALFATE 1 G: 1 TABLET ORAL at 17:55

## 2024-08-16 RX ADMIN — ALBUMIN (HUMAN) 12.5 G: 0.25 INJECTION, SOLUTION INTRAVENOUS at 10:13

## 2024-08-16 RX ADMIN — SUCRALFATE 1 G: 1 TABLET ORAL at 13:53

## 2024-08-16 RX ADMIN — ASPIRIN 81 MG: 81 TABLET, COATED ORAL at 08:30

## 2024-08-16 RX ADMIN — ALBUMIN (HUMAN) 12.5 G: 0.25 INJECTION, SOLUTION INTRAVENOUS at 23:11

## 2024-08-16 RX ADMIN — TACROLIMUS 1 MG: 0.5 CAPSULE ORAL at 08:30

## 2024-08-16 RX ADMIN — ATORVASTATIN CALCIUM 20 MG: 20 TABLET, FILM COATED ORAL at 20:16

## 2024-08-16 RX ADMIN — SUCRALFATE 1 G: 1 TABLET ORAL at 05:20

## 2024-08-16 RX ADMIN — METOPROLOL TARTRATE 200 MG: 50 TABLET, FILM COATED ORAL at 08:30

## 2024-08-16 RX ADMIN — LEVOTHYROXINE SODIUM 112 MCG: 0.11 TABLET ORAL at 05:20

## 2024-08-16 RX ADMIN — GANCICLOVIR SODIUM 40 MG: 50 INJECTION, POWDER, LYOPHILIZED, FOR SOLUTION INTRAVENTRICULAR at 16:38

## 2024-08-16 RX ADMIN — SODIUM CHLORIDE: 9 INJECTION, SOLUTION INTRAVENOUS at 21:26

## 2024-08-16 RX ADMIN — ALLOPURINOL 100 MG: 100 TABLET ORAL at 08:31

## 2024-08-16 RX ADMIN — SUCRALFATE 1 G: 1 TABLET ORAL at 00:34

## 2024-08-16 RX ADMIN — FILGRASTIM-AAFI 300 MCG: 300 INJECTION, SOLUTION SUBCUTANEOUS at 11:32

## 2024-08-16 RX ADMIN — SODIUM CHLORIDE, PRESERVATIVE FREE 10 ML: 5 INJECTION INTRAVENOUS at 20:18

## 2024-08-16 RX ADMIN — ONDANSETRON 4 MG: 2 INJECTION INTRAMUSCULAR; INTRAVENOUS at 08:31

## 2024-08-16 RX ADMIN — PROMETHAZINE HYDROCHLORIDE 12.5 MG: 25 INJECTION INTRAMUSCULAR; INTRAVENOUS at 21:27

## 2024-08-16 RX ADMIN — ALBUMIN (HUMAN) 12.5 G: 0.25 INJECTION, SOLUTION INTRAVENOUS at 00:42

## 2024-08-16 RX ADMIN — ALBUMIN (HUMAN) 12.5 G: 0.25 INJECTION, SOLUTION INTRAVENOUS at 18:02

## 2024-08-16 RX ADMIN — TACROLIMUS 0.5 MG: 0.5 CAPSULE ORAL at 20:16

## 2024-08-16 RX ADMIN — METOPROLOL TARTRATE 200 MG: 50 TABLET, FILM COATED ORAL at 20:16

## 2024-08-16 ASSESSMENT — PAIN - FUNCTIONAL ASSESSMENT: PAIN_FUNCTIONAL_ASSESSMENT: ACTIVITIES ARE NOT PREVENTED

## 2024-08-16 ASSESSMENT — PAIN DESCRIPTION - LOCATION: LOCATION: ABDOMEN

## 2024-08-16 ASSESSMENT — PAIN SCALES - GENERAL
PAINLEVEL_OUTOF10: 0
PAINLEVEL_OUTOF10: 5
PAINLEVEL_OUTOF10: 0

## 2024-08-16 ASSESSMENT — PAIN DESCRIPTION - DESCRIPTORS: DESCRIPTORS: ACHING

## 2024-08-16 ASSESSMENT — PAIN DESCRIPTION - ORIENTATION: ORIENTATION: MID

## 2024-08-16 ASSESSMENT — PAIN DESCRIPTION - PAIN TYPE: TYPE: ACUTE PAIN

## 2024-08-16 ASSESSMENT — PAIN DESCRIPTION - ONSET: ONSET: SUDDEN

## 2024-08-16 NOTE — PROGRESS NOTES
Gastroenterology Progress Note      Jessica Reyes is a 73 y.o. female patient.  1. Acute renal failure superimposed on chronic kidney disease, unspecified acute renal failure type, unspecified CKD stage (HCC)    2. Diarrhea, unspecified type    3. Nausea    4. Abdominal pain, epigastric        SUBJECTIVE: Diarrhea resolved.  She had a small formed BM today.  Some nausea at times.  No vomiting.      Physical    VITALS:  /80   Pulse 67   Temp 97.6 °F (36.4 °C) (Oral)   Resp 16   Ht 1.626 m (5' 4\")   Wt 74.8 kg (164 lb 14.5 oz)   SpO2 95%   BMI 28.31 kg/m²   TEMPERATURE:  Current - Temp: 97.6 °F (36.4 °C); Max - Temp  Av.7 °F (36.5 °C)  Min: 97.2 °F (36.2 °C)  Max: 98.1 °F (36.7 °C)    NAD  RRR   Lungs CTA Bilaterally, normal effort  Abdomen soft, ND, NT, no HSM, Bowel sounds normal       Data    Data Review:    Recent Labs     24  0518 08/15/24  0618 24  0529   WBC 1.6* 1.3* 1.3*   HGB 6.6* 7.3* 6.9*   HCT 20.7* 21.8* 21.4*   MCV 80.6 81.6 83.3   * 116* 114*     Recent Labs     24  0518 08/15/24  0618 24  0529    143 140   K 4.2 3.9 3.5    114* 113*   CO2 19* 19* 16*   BUN 75* 71* 69*   CREATININE 3.1* 3.8* 3.8*     No results for input(s): \"AST\", \"ALT\", \"BILIDIR\", \"BILITOT\", \"ALKPHOS\" in the last 72 hours.    Invalid input(s): \"ALB\"    No results for input(s): \"LIPASE\", \"AMYLASE\" in the last 72 hours.    No results for input(s): \"PROTIME\", \"INR\" in the last 72 hours.  Invalid input(s): \"PTT\"         ASSESSMENT / PLAN      Acute diarrhea - began a week and a half prior to admission.  Was having about 10 large-volume diarrheal stools at home the day prior to admission.  CT nonacute.  Neg C.diff, EIA, and GI bacterial pathogens.  She declined colonoscopy to eval for source of diarrhea including CMV colitis.  Diarrhea resolved.  Had a small formed BM today.  In the meantime, CMV PCR came back positive. will ask ID to eval.  - follow up parvovirus

## 2024-08-16 NOTE — CONSULTS
Urology Consult Note  [facility]     Patient: Jessica Reyes MRN: [id number]  Room/Bed: 5TN-5585/5585-01   YOB: 1950  Age/Sex: 73 y.o.female  Admission Date: 8/12/2024     Date of Service:  8/16/2024    Consulting Provider: Javier Andersen PA-C  Admitting/Requesting Physician: Ra Boothe MD  Primary Care Physician: Mehran Stein MD    Reason for Consult: Hydronephrosis     ASSESSMENT/PLAN     72 yo female     Hx renal transplant 11 years ago  Admitted with diarrhea, CMV positive   IMMANUEL  Pancytopenia   Urology consulted for hydronephrosis of transplanted kidney   Afvss. Cr 3.8 with baseline cr low 2's.   CT 8/12 shows both native kidneys atrophic with right pelvic kidney grossly normal with normal bladder. Follow up SUBHASH 8/15 mild hydro of transplanted kidney.   She has no specific  complaints today.     Recommendations:  Reviewed with attending. CT showed no source of obstruction or hydronephrosis. No mass or stone. The very mild hydro on SUBHASH is probably from reflux. Bladder scan and could consider nagy placement, but will hold off on placing a stent for now. Will follow closely and trend Cr. Call with questions     All the patients questions were answered. She understands the plan as listed above.    HISTORY     Chief Complaint:   Chief Complaint   Patient presents with    Diarrhea     Diarrhea x 1 week.        History of Present Illness: Jessica Reyes is a 73 y.o. female admitted with a number of issues. She is noted to have mild hydro of transplant kidney on SUBHASH a day ago, however CT from 8/12 was completely unremarkable with no stone or mass seen. She has no specific  complaints today.     Past Medical History:  She has a past medical history of Clostridium difficile diarrhea, Esophageal reflux, Hyperlipidemia, Hypertension, Nephropathy, membranous, Nephrotic syndrome, and Thyroid disease.     Past Surgical History:  She has a past surgical history that

## 2024-08-16 NOTE — PLAN OF CARE
Problem: Safety - Adult  Goal: Free from fall injury  Outcome: Progressing     Problem: Neurosensory - Adult  Goal: Achieves stable or improved neurological status  Outcome: Progressing  Goal: Remains free of injury related to seizures activity  Outcome: Progressing  Goal: Achieves maximal functionality and self care  Outcome: Progressing     Problem: Respiratory - Adult  Goal: Achieves optimal ventilation and oxygenation  Outcome: Progressing     Problem: Cardiovascular - Adult  Goal: Maintains optimal cardiac output and hemodynamic stability  Outcome: Progressing  Goal: Absence of cardiac dysrhythmias or at baseline  Outcome: Progressing     Problem: Skin/Tissue Integrity - Adult  Goal: Skin integrity remains intact  Outcome: Progressing  Goal: Incisions, wounds, or drain sites healing without S/S of infection  Outcome: Progressing  Goal: Oral mucous membranes remain intact  Outcome: Progressing     Problem: Musculoskeletal - Adult  Goal: Return mobility to safest level of function  Outcome: Progressing  Goal: Maintain proper alignment of affected body part  Outcome: Progressing  Goal: Return ADL status to a safe level of function  Outcome: Progressing     Problem: Gastrointestinal - Adult  Goal: Minimal or absence of nausea and vomiting  Outcome: Progressing  Goal: Maintains or returns to baseline bowel function  Outcome: Progressing  Goal: Maintains adequate nutritional intake  Outcome: Progressing     Problem: Genitourinary - Adult  Goal: Absence of urinary retention  Outcome: Progressing     Problem: Infection - Adult  Goal: Absence of infection at discharge  Outcome: Progressing  Goal: Absence of infection during hospitalization  Outcome: Progressing     Problem: Metabolic/Fluid and Electrolytes - Adult  Goal: Electrolytes maintained within normal limits  Outcome: Progressing  Goal: Hemodynamic stability and optimal renal function maintained  Outcome: Progressing  Goal: Glucose maintained within prescribed  range  Outcome: Progressing     Problem: Hematologic - Adult  Goal: Maintains hematologic stability  Outcome: Progressing     Problem: Pain  Goal: Verbalizes/displays adequate comfort level or baseline comfort level  Outcome: Progressing     Problem: Nutrition Deficit:  Goal: Optimize nutritional status  Outcome: Progressing

## 2024-08-16 NOTE — PROGRESS NOTES
[facility] Note    Patient Active Problem List   Diagnosis    IMMANUEL (acute kidney injury) (HCC)    Pancytopenia (HCC)    High cholesterol    Hypothyroid    Diarrhea    Overweight (BMI 25.0-29.9)    Mixed hyperlipidemia    History of nephrotic syndrome    History of Clostridioides difficile colitis    Essential hypertension    Hypocalcemia    Hypomagnesemia    History of renal transplant    Cytomegalovirus (CMV) viremia (HCC)    Abdominal pain, epigastric    Nausea       Past Medical History:   has a past medical history of Clostridium difficile diarrhea, Esophageal reflux, Hyperlipidemia, Hypertension, Nephropathy, membranous, Nephrotic syndrome, and Thyroid disease.    Past Social History:   reports that she has never smoked. She has never used smokeless tobacco. She reports that she does not drink alcohol and does not use drugs.    Subjective:    Diarrhea better  Underwent CT-guided bone marrow biopsy   Feels like she is bloated.  No abdominal pain.  No change in urine output.    Review of Systems   Constitutional:  Positive for fatigue. Negative for activity change, appetite change, chills, fever and unexpected weight change.   HENT:  Negative for congestion and facial swelling.    Eyes:  Negative for photophobia, discharge and redness.   Respiratory:  Negative for cough, chest tightness and shortness of breath.    Cardiovascular:  Negative for chest pain, palpitations and leg swelling.   Gastrointestinal:  Positive for diarrhea. Negative for abdominal distention, abdominal pain, blood in stool, constipation, nausea and vomiting.   Endocrine: Negative for cold intolerance, heat intolerance and polyuria.   Genitourinary:  Negative for decreased urine volume, difficulty urinating, flank pain and hematuria.   Musculoskeletal:  Negative for joint swelling and neck pain.   Neurological:  Negative for dizziness, seizures, syncope, speech difficulty,

## 2024-08-16 NOTE — PROGRESS NOTES
ONCOLOGY HEMATOLOGY CARE PROGRESS NOTE      SUBJECTIVE:  Feeling about the same.  No new complaints.    ROS:     Constitutional:  No weight loss, No fever, No chills, No night sweats.   Eyes:  No impairment or change in vision  ENT / Mouth:  No pain, abnormal ulceration, bleeding, nasal drip or change in voice or hearing  Cardiovascular:  No chest pain, palpitations, new edema, or calf discomfort  Respiratory:  No pain, hemoptysis, change to breathing  Gastrointestinal: Persistent diarrhea.  Urinary:  No pain, bleeding or change in continence  Genitalia: No pain, bleeding or discharge  Musculoskeletal:  No redness, pain, edema or weakness  Skin:  No pruritus, rash, change to nodules or lesions  Neurologic:  No discomfort, change in mental status, speech, sensory or motor activity  Psychiatric:  No change in concentration or change to affect or mood  Endocrine:  No hot flashes, increased thirst, or change to urine production  Hematologic: No petechiae, ecchymosis or bleeding  Lymphatic:  No lymphadenopathy or lymphedema  Allergy / Immunologic:  No eczema, hives, frequent or recurrent infections    OBJECTIVE        Physical    VITALS:  Patient Vitals for the past 24 hrs:   BP Temp Temp src Pulse Resp SpO2 Height Weight   08/16/24 0814 117/70 97.7 °F (36.5 °C) Oral 67 16 97 % -- --   08/16/24 0600 -- -- -- -- -- -- -- 74.8 kg (164 lb 14.5 oz)   08/16/24 0335 109/69 97.8 °F (36.6 °C) Oral 64 16 95 % -- --   08/15/24 2109 115/74 98.1 °F (36.7 °C) Oral 73 16 97 % -- --   08/15/24 1730 127/80 97.2 °F (36.2 °C) Oral 69 16 96 % -- --   08/15/24 1715 127/74 98 °F (36.7 °C) Oral 67 16 96 % -- --   08/15/24 1636 117/77 98 °F (36.7 °C) Oral 69 16 96 % -- --   08/15/24 1105 107/73 97.6 °F (36.4 °C) Oral 74 16 96 % -- --   08/15/24 1057 -- -- -- -- -- -- 1.626 m (5' 4\") --   08/15/24 0900 128/79 97.5 °F (36.4 °C) Oral 67 16 96 % -- --       24HR INTAKE/OUTPUT:    Intake/Output Summary (Last 24  the  procedure and recorded in the patient's medical record by the nurse.    TECHNIQUE:  Informed consent was obtained following a detailed explanation of the  procedure including risks, benefits, and alternatives.  Universal protocol  was followed. Sterile gowns, masks, hats and gloves utilized for maximal  sterile barrier. Axial images were obtained through the iliac bones using CT  guidance and a suitable skin site was prepped and draped in sterile fashion.  Local anesthesia was achieved with lidocaine.  An 11 gauge "[employer] bone  marrow biopsy needle was advanced into the right iliac bone and approximately  15 mL of bone marrow aspirate was obtained.  A single core biopsy specimen  was obtained and the patient tolerated the procedure well.    Estimated blood loss: Less than 5 cc    Automated exposure control, iterative reconstruction, and/or weight based  adjustment of the mA/kV was utilized to reduce the radiation dose to as low  as reasonably achievable.    DLP: 101.16 mGy-cm  Impression: Successful CT guided bone marrow aspiration and core biopsy of the right  iliac bone.      Problem List  Patient Active Problem List   Diagnosis    IMMANUEL (acute kidney injury) (HCC)    Pancytopenia (HCC)    High cholesterol    Hypothyroid    Diarrhea       ASSESSMENT AND PLAN:  Pancytopenia likely due to myelodysplasia.  No clear evidence of B12, folate or iron deficiency.  SPEP negative for monoclonal gammopathy.  Bone marrow aspiration and biopsy on 8/14/2024.  IMMANUEL on CKD.  Renal function worse.  History of kidney transplant in early 2013.  On tacrolimus.     Reviewed her labs.  Persistent pancytopenia with profound neutropenia.  Afebrile.  Spoke to the pathologist this morning.  Evidence of myelodysplasia and less than 5% blasts on flow cytometry.  Await final pathology.  Will be given packed red cell transfusion.  Will start her on filgrastim.  Spoke to the pharmacist and the nurse.      ONCOLOGIC DISPOSITION:      Tenzin MANJARREZ

## 2024-08-16 NOTE — PROGRESS NOTES
400 mg, IntraVENous, Q24H  promethazine (PHENERGAN) 12.5 mg in sodium chloride 0.9 % 50 mL IVPB, 12.5 mg, IntraVENous, Q6H PRN         Objective:  /70   Pulse 67   Temp 97.7 °F (36.5 °C) (Oral)   Resp 16   Ht 1.626 m (5' 4\")   Wt 74.8 kg (164 lb 14.5 oz)   SpO2 97%   BMI 28.31 kg/m²      Patient Vitals for the past 24 hrs:   BP Temp Temp src Pulse Resp SpO2 Height Weight   08/16/24 0814 117/70 97.7 °F (36.5 °C) Oral 67 16 97 % -- --   08/16/24 0600 -- -- -- -- -- -- -- 74.8 kg (164 lb 14.5 oz)   08/16/24 0335 109/69 97.8 °F (36.6 °C) Oral 64 16 95 % -- --   08/15/24 2109 115/74 98.1 °F (36.7 °C) Oral 73 16 97 % -- --   08/15/24 1730 127/80 97.2 °F (36.2 °C) Oral 69 16 96 % -- --   08/15/24 1715 127/74 98 °F (36.7 °C) Oral 67 16 96 % -- --   08/15/24 1636 117/77 98 °F (36.7 °C) Oral 69 16 96 % -- --   08/15/24 1105 107/73 97.6 °F (36.4 °C) Oral 74 16 96 % -- --   08/15/24 1057 -- -- -- -- -- -- 1.626 m (5' 4\") --   08/15/24 0900 128/79 97.5 °F (36.4 °C) Oral 67 16 96 % -- --     Patient Vitals for the past 96 hrs (Last 3 readings):   Weight   08/16/24 0600 74.8 kg (164 lb 14.5 oz)   08/12/24 1200 74.8 kg (165 lb)           Intake/Output Summary (Last 24 hours) at 8/16/2024 0840  Last data filed at 8/16/2024 0503  Gross per 24 hour   Intake 580 ml   Output 1150 ml   Net -570 ml         Physical Exam: (2-7 system for EPF/Detailed, ?8 for Comprehensive)  /70   Pulse 67   Temp 97.7 °F (36.5 °C) (Oral)   Resp 16   Ht 1.626 m (5' 4\")   Wt 74.8 kg (164 lb 14.5 oz)   SpO2 97%   BMI 28.31 kg/m²   Constitutional: vitals as above: alert, appears stated age and cooperative    Psychiatric: normal insight and judgment, oriented to person, place, time, and general circumstances    Head: Normocephalic, without obvious abnormality, atraumatic    Eyes:lids and lashes normal, conjunctivae and sclerae normal and pupils equal, round, reactive to light and accomodation    EMNT: external ears normal, nares midline  Stable.  CMV positive  Plan: GI on board. Awaiting Parvo studies. Pt still refusing colonoscopy      Case discussed with: GI  Tests ordered/reviewed: cbc, bmp, FSBS, Ca            (Please note that portions of this note were completed with a voice recognition program.  Efforts were made to edit the dictations but occasionally words are mis-transcribed.)        Mehran Stein MD  8/16/2024    Please use [employer]ile[employer] to contact me with any questions during the day.   The hospitalist service will provide cross-coverage for this patient from 7pm to 7am.    During those hours, contact the on-call hospitalist MD/LILI for questions.

## 2024-08-16 NOTE — CONSULTS
Infectious Diseases   Consult Note        Admission Date: 8/12/2024  Hospital Day: Hospital Day: 5   Attending: Mehran Stein MD  Date of service: 8/16/24     Reason for admission: Abdominal pain, epigastric [R10.13]  Nausea [R11.0]  IMMANUEL (acute kidney injury) ([facility]) [N17.9]  Diarrhea, unspecified type [R19.7]  Acute renal failure superimposed on chronic kidney disease, unspecified acute renal failure type, unspecified CKD stage (HCC) [N17.9, N18.9]    Chief complaint/ Reason for consult: CMV viremia, acute kidney injury on chronic kidney disease, pancytopenia    Microbiology:        I have reviewed allavailable micro lab data and cultures      Urine culture  - collected on 8/12/2024: Negative    CMV by PCR Quantitative  Order: [id number]  Status: Final result       Visible to patient: Yes (not seen)       Next appt: 08/29/2024 at 03:45 PM in Nephrology (Reno Duffy MD)    0 Result Notes      Component  Ref Range & Units 8/13/24 0829   CMV Qnt by NAAT, Plasma IU/mL  IU/mL 3,140   CMV Qnt by NAAT, Plasma log IU/mL  log IU/mL 3.50   CMV Qnt by NAAT, Plasma Interp  Not Detected Detected Abnormal             Antibiotics and immunizations:       Current antibiotics: All antibiotics and their doses were reviewed by me    Recent Abx Admin                     ciprofloxacin (CIPRO) IVPB 400 mg ()  Restarted 08/16/24 0037     400 mg New Bag 08/15/24 2118                      Immunization History: All immunization history was reviewed by me today.      There is no immunization history on file for this patient.    Known drug allergies:     All allergies were reviewed and updated    Allergies   Allergen Reactions    Cefuroxime Axetil Swelling     Lips to swell       Social history:     Social History:  All social andepidemiologic history was reviewed and updated by me today as needed.     Tobacco use:   reports that she has never smoked. She has never used smokeless tobacco.  Alcohol use:   reports no history of  General: Skin is warm and dry.      Coloration: Skin is not jaundiced.      Findings: No bruising, erythema or rash.   Neurological:      General: No focal deficit present.      Mental Status: She is alert and oriented to person, place, and time. Mental status is at baseline.      Motor: No abnormal muscle tone.   Psychiatric:         Mood and Affect: Mood normal.         Behavior: Behavior normal.           Lines and drains: All vascular access sites are healthy with no local erythema, discharge or tenderness.      Intake and output:     I/O last 3 completed shifts:  In: 1943.5 [P.O.:820; I.V.:1123.5]  Out: 1150 [Urine:1150]    Lab Data:   All available labs were reviewed by me today.     CBC:   Recent Labs     08/14/24  0518 08/15/24  0618 08/16/24  0529   WBC 1.6* 1.3* 1.3*   RBC 2.57* 2.67* 2.57*   HGB 6.6* 7.3* 6.9*   HCT 20.7* 21.8* 21.4*   * 116* 114*   MCV 80.6 81.6 83.3   MCH 25.6* 27.3 26.7   MCHC 31.8 33.4 32.1   RDW 14.3 14.9 14.8        BMP:  Recent Labs     08/14/24  0518 08/15/24  0618 08/16/24  0529    143 140   K 4.2 3.9 3.5    114* 113*   CO2 19* 19* 16*   BUN 75* 71* 69*   CREATININE 3.1* 3.8* 3.8*   CALCIUM 8.4 8.3 8.1*   GLUCOSE 121* 130* 118*        Hepatic FunctionPanel:   Lab Results   Component Value Date/Time    ALKPHOS 76 08/12/2024 12:12 PM    ALT 10 08/12/2024 12:12 PM    AST 21 08/12/2024 12:12 PM    BILITOT 0.3 08/12/2024 12:12 PM    BILIDIR <0.2 01/18/2020 10:49 AM    IBILI see below 01/18/2020 10:49 AM       CPK:   Lab Results   Component Value Date    CKTOTAL 50 08/13/2024     ESR: No results found for: \"SEDRATE\"  CRP: No results found for: \"CRP\"      Imaging:    All pertinent images and reports for the current visit were reviewed by me during this visit.  I reviewed the chest x-ray/CT scan/MRI images and independently interpreted the findings and results today.    US RENAL COMPLETE   Final Result   1. Transplanted right pelvic kidney demonstrating mild

## 2024-08-17 LAB
ANION GAP SERPL CALCULATED.3IONS-SCNC: 10 MMOL/L (ref 3–16)
BASOPHILS # BLD: 0 K/UL (ref 0–0.2)
BASOPHILS NFR BLD: 0.1 %
BUN SERPL-MCNC: 54 MG/DL (ref 7–20)
CALCIUM SERPL-MCNC: 8.2 MG/DL (ref 8.3–10.6)
CHLORIDE SERPL-SCNC: 116 MMOL/L (ref 99–110)
CO2 SERPL-SCNC: 18 MMOL/L (ref 21–32)
CREAT SERPL-MCNC: 2.9 MG/DL (ref 0.6–1.2)
DEPRECATED RDW RBC AUTO: 14.7 % (ref 12.4–15.4)
EOSINOPHIL # BLD: 0.1 K/UL (ref 0–0.6)
EOSINOPHIL NFR BLD: 2 %
GFR SERPLBLD CREATININE-BSD FMLA CKD-EPI: 17 ML/MIN/{1.73_M2}
GLUCOSE SERPL-MCNC: 136 MG/DL (ref 70–99)
HCT VFR BLD AUTO: 24.4 % (ref 36–48)
HGB BLD-MCNC: 8.1 G/DL (ref 12–16)
HIV 1+2 AB+HIV1 P24 AG SERPL QL IA: NORMAL
HIV 2 AB SERPL QL IA: NORMAL
HIV1 AB SERPL QL IA: NORMAL
HIV1 P24 AG SERPL QL IA: NORMAL
LYMPHOCYTES # BLD: 0.5 K/UL (ref 1–5.1)
LYMPHOCYTES NFR BLD: 11.3 %
MAGNESIUM SERPL-MCNC: 1.5 MG/DL (ref 1.8–2.4)
MCH RBC QN AUTO: 27.6 PG (ref 26–34)
MCHC RBC AUTO-ENTMCNC: 33.3 G/DL (ref 31–36)
MCV RBC AUTO: 82.8 FL (ref 80–100)
MONOCYTES # BLD: 0.4 K/UL (ref 0–1.3)
MONOCYTES NFR BLD: 10.2 %
NEUTROPHILS # BLD: 3.2 K/UL (ref 1.7–7.7)
NEUTROPHILS NFR BLD: 76.4 %
PLATELET # BLD AUTO: 123 K/UL (ref 135–450)
PMV BLD AUTO: 8.4 FL (ref 5–10.5)
POTASSIUM SERPL-SCNC: 3.3 MMOL/L (ref 3.5–5.1)
PROCALCITONIN SERPL IA-MCNC: 0.18 NG/ML (ref 0–0.15)
RBC # BLD AUTO: 2.95 M/UL (ref 4–5.2)
SODIUM SERPL-SCNC: 144 MMOL/L (ref 136–145)
WBC # BLD AUTO: 4.2 K/UL (ref 4–11)

## 2024-08-17 PROCEDURE — 80048 BASIC METABOLIC PNL TOTAL CA: CPT

## 2024-08-17 PROCEDURE — 84145 PROCALCITONIN (PCT): CPT

## 2024-08-17 PROCEDURE — 83735 ASSAY OF MAGNESIUM: CPT

## 2024-08-17 PROCEDURE — 6360000002 HC RX W HCPCS: Performed by: INTERNAL MEDICINE

## 2024-08-17 PROCEDURE — 1200000000 HC SEMI PRIVATE

## 2024-08-17 PROCEDURE — 2580000003 HC RX 258: Performed by: INTERNAL MEDICINE

## 2024-08-17 PROCEDURE — 85025 COMPLETE CBC W/AUTO DIFF WBC: CPT

## 2024-08-17 PROCEDURE — 2580000003 HC RX 258: Performed by: STUDENT IN AN ORGANIZED HEALTH CARE EDUCATION/TRAINING PROGRAM

## 2024-08-17 PROCEDURE — P9047 ALBUMIN (HUMAN), 25%, 50ML: HCPCS | Performed by: INTERNAL MEDICINE

## 2024-08-17 PROCEDURE — 36415 COLL VENOUS BLD VENIPUNCTURE: CPT

## 2024-08-17 PROCEDURE — 6370000000 HC RX 637 (ALT 250 FOR IP): Performed by: INTERNAL MEDICINE

## 2024-08-17 RX ORDER — MAGNESIUM SULFATE IN WATER 40 MG/ML
2000 INJECTION, SOLUTION INTRAVENOUS PRN
Status: DISCONTINUED | OUTPATIENT
Start: 2024-08-17 | End: 2024-08-17

## 2024-08-17 RX ORDER — MAGNESIUM SULFATE IN WATER 40 MG/ML
2000 INJECTION, SOLUTION INTRAVENOUS ONCE
Status: COMPLETED | OUTPATIENT
Start: 2024-08-17 | End: 2024-08-17

## 2024-08-17 RX ADMIN — LEVOTHYROXINE SODIUM 112 MCG: 0.11 TABLET ORAL at 06:31

## 2024-08-17 RX ADMIN — SUCRALFATE 1 G: 1 TABLET ORAL at 06:31

## 2024-08-17 RX ADMIN — GANCICLOVIR SODIUM 40 MG: 50 INJECTION, POWDER, LYOPHILIZED, FOR SOLUTION INTRAVENTRICULAR at 16:13

## 2024-08-17 RX ADMIN — TACROLIMUS 0.5 MG: 0.5 CAPSULE ORAL at 20:56

## 2024-08-17 RX ADMIN — SODIUM CHLORIDE, PRESERVATIVE FREE 10 ML: 5 INJECTION INTRAVENOUS at 20:56

## 2024-08-17 RX ADMIN — TACROLIMUS 0.5 MG: 0.5 CAPSULE ORAL at 08:56

## 2024-08-17 RX ADMIN — FAMOTIDINE 20 MG: 20 TABLET ORAL at 08:56

## 2024-08-17 RX ADMIN — ALLOPURINOL 100 MG: 100 TABLET ORAL at 08:56

## 2024-08-17 RX ADMIN — FILGRASTIM-AAFI 300 MCG: 300 INJECTION, SOLUTION SUBCUTANEOUS at 09:05

## 2024-08-17 RX ADMIN — METOPROLOL TARTRATE 200 MG: 50 TABLET, FILM COATED ORAL at 20:56

## 2024-08-17 RX ADMIN — SUCRALFATE 1 G: 1 TABLET ORAL at 17:58

## 2024-08-17 RX ADMIN — ALBUMIN (HUMAN) 12.5 G: 0.25 INJECTION, SOLUTION INTRAVENOUS at 09:02

## 2024-08-17 RX ADMIN — MAGNESIUM SULFATE HEPTAHYDRATE 2000 MG: 40 INJECTION, SOLUTION INTRAVENOUS at 12:48

## 2024-08-17 RX ADMIN — ATORVASTATIN CALCIUM 20 MG: 20 TABLET, FILM COATED ORAL at 20:56

## 2024-08-17 RX ADMIN — METOPROLOL TARTRATE 200 MG: 50 TABLET, FILM COATED ORAL at 08:56

## 2024-08-17 RX ADMIN — SUCRALFATE 1 G: 1 TABLET ORAL at 12:34

## 2024-08-17 RX ADMIN — ASPIRIN 81 MG: 81 TABLET, COATED ORAL at 08:56

## 2024-08-17 RX ADMIN — SODIUM CHLORIDE: 9 INJECTION, SOLUTION INTRAVENOUS at 12:47

## 2024-08-17 RX ADMIN — SODIUM CHLORIDE, PRESERVATIVE FREE 10 ML: 5 INJECTION INTRAVENOUS at 09:05

## 2024-08-17 ASSESSMENT — PAIN SCALES - GENERAL
PAINLEVEL_OUTOF10: 0
PAINLEVEL_OUTOF10: 0

## 2024-08-17 NOTE — PROGRESS NOTES
Patients head to toe assessment completed. Vital signs WNL, call light within reach. Scheduled medications given per MAR. Patient denies any pain at the moment. Patient resting in bed.

## 2024-08-17 NOTE — PROGRESS NOTES
Urology Progress Note  [facility]     Patient: Jessica Reyes MRN: [id number]  Room/Bed: 5TN-5585/5585-01   YOB: 1950  Age/Sex: 73 y.o.female  Admission Date: 8/12/2024     Date of Service:  8/17/2024    ASSESSMENT/PLAN     1. Acute renal failure superimposed on chronic kidney disease, unspecified acute renal failure type, unspecified CKD stage (HCC)    2. Diarrhea, unspecified type    3. Nausea    4. Abdominal pain, epigastric      72 yo female      Hx renal transplant 11 years ago  Admitted with diarrhea, CMV positive   IMMANUEL  Pancytopenia   Urology consulted for hydronephrosis of transplanted kidney   Afvss. Cr 3.8 with baseline cr low 2's.   CT 8/12 shows both native kidneys atrophic with right pelvic kidney grossly normal with normal bladder. Follow up SUBHASH 8/15 mild hydro of transplanted kidney.   She has no specific  complaints today.   ==  Cr better today 2.9 from 3.8  No  sx. No pain. Voiding fine.     Recommendations:  Reviewed with attending. CT showed no source of obstruction or hydronephrosis. No mass or stone. The very mild hydro on SUBHASH is probably from reflux. Bladder scan and could consider nagy placement, but will hold off on placing a stent for now. Will follow closely and trend Cr. Call with questions. Stable today from a urology standpoint.     All patient questions were answered. She understands the plan as listed above.    SUBJECTIVE     Chief Complaint:   Chief Complaint   Patient presents with    Diarrhea     Diarrhea x 1 week.        24 Hour Events: Today patient reports doing great.  Otherwise symptoms are overall improved and pain is adequately controlled.  Denies nausea/vomiting.  No other genitourinary symptoms.    OBJECTIVE     Hospital Problem List:  Principal Problem:    IMMANUEL (acute kidney injury) (HCC)  Active Problems:    Pancytopenia (HCC)    High cholesterol    Hypothyroid    Diarrhea    Overweight (BMI 25.0-29.9)    Mixed hyperlipidemia     obtained through the iliac bones using CT guidance and a suitable skin site was prepped and draped in sterile fashion. Local anesthesia was achieved with lidocaine.  An 11 gauge AppSense bone marrow biopsy needle was advanced into the right iliac bone and approximately 15 mL of bone marrow aspirate was obtained.  A single core biopsy specimen was obtained and the patient tolerated the procedure well. Estimated blood loss: Less than 5 cc Automated exposure control, iterative reconstruction, and/or weight based adjustment of the mA/kV was utilized to reduce the radiation dose to as low as reasonably achievable. DLP: 101.16 mGy-cm     Successful CT guided bone marrow aspiration and core biopsy of the right iliac bone.     CT ABDOMEN PELVIS WO CONTRAST Additional Contrast? None    Result Date: 8/12/2024  EXAMINATION: CT OF THE ABDOMEN AND PELVIS WITHOUT CONTRAST 8/12/2024 1:08 pm TECHNIQUE: CT of the abdomen and pelvis was performed without the administration of intravenous contrast. Multiplanar reformatted images are provided for review. Automated exposure control, iterative reconstruction, and/or weight based adjustment of the mA/kV was utilized to reduce the radiation dose to as low as reasonably achievable. COMPARISON: None. HISTORY: ORDERING SYSTEM PROVIDED HISTORY: abd pain TECHNOLOGIST PROVIDED HISTORY: Reason for exam:->abd pain Additional Contrast?->None Decision Support Exception - unselect if not a suspected or confirmed emergency medical condition->Emergency Medical Condition (MA) Reason for Exam: abd pain FINDINGS: Lower Chest: Subtle area of increased attenuation in the anterior left mid lung zone likely representing fibrosis or scarring.  No worrisome nodule, no pneumothorax or infiltrate. Organs: Liver: The liver is slightly hypoplastic with slight lobulation.  This may reflect mild cirrhosis.  There is no significant varicosities. Gallbladder: Normal Pancreas: Pancreas is atrophic and fatty replaced.

## 2024-08-17 NOTE — PROGRESS NOTES
Progress Note - Dr. Stein - Internal Medicine  PCP: Mehran Stein MD [id number] [facility] [address] [phone]    Hospital Day: 5  Code Status: Full Code  Current Diet: ADULT DIET; Regular        CC: follow up on medical issues    Subjective:   Jessica Reyes is a 73 y.o. female.    Pt seen and examined  Chart reviewed since last visit, labs and imaging below      BMBx pending, parvo pending  CMV is positive - now on gancyclovir per ID recs  Pt still declining colonoscopy    Creat slightly better    Review of Systems: (1 system for EPF, 2-9 for detailed, 10+ for comprehensive)  Constitutional: Negative for chills and fever.     HENT: Negative for dental problem, nosebleeds and rhinorrhea.      Eyes: Negative for photophobia and visual disturbance.     Respiratory: Negative for cough, chest tightness and shortness of breath.      Cardiovascular: Negative for chest pain and leg swelling.     Gastrointestinal: positive for diarrhea, negative for nausea and vomiting.     Endocrine: Negative for polydipsia and polyphagia.     Genitourinary: Negative for frequency, hematuria and urgency.     Musculoskeletal: Negative for back pain and myalgias.     Skin: Negative for rash.     Allergic/Immunologic: Negative for food allergies.     Neurological: Negative for dizziness, seizures, syncope and facial asymmetry.     Hematological: Negative for adenopathy.     Psychiatric/Behavioral: Negative for dysphoric mood. The patient is not nervous/anxious.        I have reviewed the patient's medical and social history in detail and updated the computerized patient record.  To recap: She  has a past medical history of Clostridium difficile diarrhea, Esophageal reflux, Hyperlipidemia, Hypertension, Nephropathy, membranous, Nephrotic syndrome, and Thyroid disease.. She  has a past surgical history that includes Abdomen surgery; Tonsillectomy; Knee arthroscopy; Elbow arthroscopy; Kidney transplant (2013); Appendectomy; and CT

## 2024-08-17 NOTE — PROGRESS NOTES
[facility] Note    Patient Active Problem List   Diagnosis    IMMANUEL (acute kidney injury) (HCC)    Pancytopenia (HCC)    High cholesterol    Hypothyroid    Diarrhea    Overweight (BMI 25.0-29.9)    Mixed hyperlipidemia    History of nephrotic syndrome    History of Clostridioides difficile colitis    Essential hypertension    Hypocalcemia    Hypomagnesemia    History of renal transplant    Cytomegalovirus (CMV) viremia (HCC)    Abdominal pain, epigastric    Nausea       Past Medical History:   has a past medical history of Clostridium difficile diarrhea, Esophageal reflux, Hyperlipidemia, Hypertension, Nephropathy, membranous, Nephrotic syndrome, and Thyroid disease.    Past Social History:   reports that she has never smoked. She has never used smokeless tobacco. She reports that she does not drink alcohol and does not use drugs.    Subjective:    Diarrhea better      No hypotension.    Improved urine output.  Medications reviewed.    On ganciclovir.    Appreciate urology assistance hydronephrosis of the transplanted kidney          Objective:    1.2L urine output past 24H.  1.8 L positive since admission  /77   Pulse 65   Temp 99 °F (37.2 °C) (Oral)   Resp 16   Ht 1.626 m (5' 4.02\")   Wt 80.6 kg (177 lb 9.6 oz)   SpO2 92%   BMI 30.47 kg/m²     Wt Readings from Last 3 Encounters:   08/17/24 80.6 kg (177 lb 9.6 oz)   07/30/24 78.5 kg (173 lb)   06/11/24 83 kg (183 lb)       BP Readings from Last 3 Encounters:   08/17/24 135/77   07/30/24 (!) 145/94   06/11/24 134/87     Chest- clear  Heart-regular  Abd-soft  Ext-1+ edema    Labs  Hemoglobin   Date Value Ref Range Status   08/17/2024 8.1 (L) 12.0 - 16.0 g/dL Final     Hematocrit   Date Value Ref Range Status   08/17/2024 24.4 (L) 36.0 - 48.0 % Final     WBC   Date Value Ref Range Status   08/17/2024 4.2 4.0 - 11.0 K/uL Final     Platelets   Date Value Ref Range Status   08/17/2024 123 (L) 135 -

## 2024-08-17 NOTE — PLAN OF CARE
Problem: Safety - Adult  Goal: Free from fall injury  8/17/2024 0020 by Kendra Fleming RN  Outcome: Progressing      Problem: Cardiovascular - Adult  Goal: Maintains optimal cardiac output and hemodynamic stability  8/17/2024 0020 by Kendra Fleming RN  Outcome: Progressing  Goal: Absence of cardiac dysrhythmias or at baseline  8/17/2024 0020 by Kendra Fleming RN  Outcome: Progressing     Problem: Skin/Tissue Integrity - Adult  Goal: Skin integrity remains intact  8/17/2024 0020 by Kendra Fleming RN  Outcome: Progressing      Problem: Gastrointestinal - Adult  Goal: Minimal or absence of nausea and vomiting  8/17/2024 0020 by Kendra Fleming RN  Outcome: Progressing    Goal: Maintains or returns to baseline bowel function  8/17/2024 0020 by Kendra Fleming RN  Outcome: Progressing  Goal: Maintains adequate nutritional intake  8/17/2024 0020 by Kendra Fleming RN  Outcome: Progressing      Problem: Infection - Adult  Goal: Absence of infection at discharge  8/17/2024 0020 by Kendra Fleming RN  Outcome: Progressing  Goal: Absence of infection during hospitalization  8/17/2024 0020 by Kendra Fleming RN  Outcome: Progressing    Problem: Infection - Adult  Goal: Absence of fever/infection during anticipated neutropenic period  Outcome: Progressing     Problem: Metabolic/Fluid and Electrolytes - Adult  Goal: Electrolytes maintained within normal limits  8/17/2024 0020 by Kendra Fleming RN  Outcome: Progressing  Goal: Hemodynamic stability and optimal renal function maintained  8/17/2024 0020 by Kendra Fleming RN  Outcome: Progressing     Problem: Hematologic - Adult  Goal: Maintains hematologic stability  8/17/2024 0020 by Kendra Fleming RN  Outcome: Progressing     Problem: Pain  Goal: Verbalizes/displays adequate comfort level or baseline comfort level  8/17/2024 0020 by Kendra Fleming RN  Outcome: Progressing     Problem: Nutrition Deficit:  Goal: Optimize

## 2024-08-17 NOTE — PLAN OF CARE
Problem: Safety - Adult  Goal: Free from fall injury  8/17/2024 1326 by Elicia Monte RN  Outcome: Progressing  8/17/2024 0020 by Kendra Fleming RN  Outcome: Progressing     Problem: Neurosensory - Adult  Goal: Achieves stable or improved neurological status  8/17/2024 1326 by Elicia Monte RN  Outcome: Progressing  8/17/2024 0020 by Kendra Fleming RN  Outcome: Progressing  Goal: Remains free of injury related to seizures activity  8/17/2024 1326 by Elicia Monte RN  Outcome: Progressing  8/17/2024 0020 by Kendra Fleming RN  Outcome: Progressing  Goal: Achieves maximal functionality and self care  8/17/2024 1326 by Elicia Monte RN  Outcome: Progressing  8/17/2024 0020 by Kendra Fleming RN  Outcome: Progressing     Problem: Respiratory - Adult  Goal: Achieves optimal ventilation and oxygenation  8/17/2024 1326 by Elicia Monte RN  Outcome: Progressing  8/17/2024 0020 by Kendra Fleming RN  Outcome: Progressing     Problem: Cardiovascular - Adult  Goal: Maintains optimal cardiac output and hemodynamic stability  8/17/2024 1326 by Elicia Monte RN  Outcome: Progressing  8/17/2024 0020 by Kendra Fleming RN  Outcome: Progressing  Goal: Absence of cardiac dysrhythmias or at baseline  8/17/2024 1326 by Elicia Monte RN  Outcome: Progressing  8/17/2024 0020 by Kendra Fleming RN  Outcome: Progressing     Problem: Skin/Tissue Integrity - Adult  Goal: Skin integrity remains intact  8/17/2024 1326 by Elicia Monte RN  Outcome: Progressing  8/17/2024 0020 by Kendra Fleming RN  Outcome: Progressing  Goal: Incisions, wounds, or drain sites healing without S/S of infection  8/17/2024 1326 by Elicia Monte RN  Outcome: Progressing  8/17/2024 0020 by Kendra Fleming RN  Outcome: Progressing  Goal: Oral mucous membranes remain intact  8/17/2024 1326 by Elicia Monte RN  Outcome: Progressing  8/17/2024 0020 by Kendra Fleming RN  Outcome: Progressing     Problem:  Musculoskeletal - Adult  Goal: Return mobility to safest level of function  8/17/2024 1326 by Elicia Monte RN  Outcome: Progressing  8/17/2024 0020 by Kendra Fleming RN  Outcome: Progressing  Goal: Maintain proper alignment of affected body part  8/17/2024 1326 by Elicia Monte RN  Outcome: Progressing  8/17/2024 0020 by Kendra Fleming RN  Outcome: Progressing  Goal: Return ADL status to a safe level of function  8/17/2024 1326 by Elicia Monte RN  Outcome: Progressing  8/17/2024 0020 by Kendra Fleming RN  Outcome: Progressing     Problem: Gastrointestinal - Adult  Goal: Minimal or absence of nausea and vomiting  8/17/2024 1326 by Elicia Monte RN  Outcome: Progressing  8/17/2024 0020 by Kendra Fleming RN  Outcome: Progressing  Goal: Maintains or returns to baseline bowel function  8/17/2024 1326 by Elicia Monte RN  Outcome: Progressing  8/17/2024 0020 by Kendra Fleming RN  Outcome: Progressing  Goal: Maintains adequate nutritional intake  8/17/2024 1326 by Elicia Monte RN  Outcome: Progressing  8/17/2024 0020 by Kendra Fleming RN  Outcome: Progressing     Problem: Genitourinary - Adult  Goal: Absence of urinary retention  8/17/2024 1326 by Elicia Monte RN  Outcome: Progressing  8/17/2024 0020 by Kendra Fleming RN  Outcome: Progressing     Problem: Infection - Adult  Goal: Absence of infection at discharge  8/17/2024 1326 by Elicia Monte RN  Outcome: Progressing  8/17/2024 0020 by Kendra Fleming RN  Outcome: Progressing  Goal: Absence of infection during hospitalization  8/17/2024 1326 by Elicia Monte RN  Outcome: Progressing  8/17/2024 0020 by Kendra Fleming RN  Outcome: Progressing  Goal: Absence of fever/infection during anticipated neutropenic period  8/17/2024 1326 by Elicia Monte RN  Outcome: Progressing  8/17/2024 0024 by Kendra Fleming RN  Outcome: Progressing     Problem: Metabolic/Fluid and Electrolytes - Adult  Goal: Electrolytes

## 2024-08-18 LAB
ANION GAP SERPL CALCULATED.3IONS-SCNC: 12 MMOL/L (ref 3–16)
B19V IGG SER IA-ACNC: 0.23 IV
B19V IGM SER IA-ACNC: 0.18 IV
BASOPHILS # BLD: 0 K/UL (ref 0–0.2)
BASOPHILS NFR BLD: 0 %
BUN SERPL-MCNC: 44 MG/DL (ref 7–20)
CALCIUM SERPL-MCNC: 8.8 MG/DL (ref 8.3–10.6)
CHLORIDE SERPL-SCNC: 115 MMOL/L (ref 99–110)
CO2 SERPL-SCNC: 17 MMOL/L (ref 21–32)
CREAT SERPL-MCNC: 2.5 MG/DL (ref 0.6–1.2)
DEPRECATED RDW RBC AUTO: 15.5 % (ref 12.4–15.4)
EOSINOPHIL # BLD: 0.1 K/UL (ref 0–0.6)
EOSINOPHIL NFR BLD: 2 %
GFR SERPLBLD CREATININE-BSD FMLA CKD-EPI: 20 ML/MIN/{1.73_M2}
GLUCOSE SERPL-MCNC: 112 MG/DL (ref 70–99)
HCT VFR BLD AUTO: 25 % (ref 36–48)
HGB BLD-MCNC: 8.3 G/DL (ref 12–16)
LYMPHOCYTES # BLD: 0.6 K/UL (ref 1–5.1)
LYMPHOCYTES NFR BLD: 10 %
MACROCYTES BLD QL SMEAR: ABNORMAL
MAGNESIUM SERPL-MCNC: 2 MG/DL (ref 1.8–2.4)
MCH RBC QN AUTO: 27.4 PG (ref 26–34)
MCHC RBC AUTO-ENTMCNC: 33.2 G/DL (ref 31–36)
MCV RBC AUTO: 82.6 FL (ref 80–100)
MONOCYTES # BLD: 0.5 K/UL (ref 0–1.3)
MONOCYTES NFR BLD: 9 %
NEUTROPHILS # BLD: 4.8 K/UL (ref 1.7–7.7)
NEUTROPHILS NFR BLD: 79 %
NRBC BLD-RTO: 1 /100 WBC
PLATELET # BLD AUTO: 120 K/UL (ref 135–450)
PLATELET BLD QL SMEAR: ABNORMAL
PMV BLD AUTO: 8.4 FL (ref 5–10.5)
POLYCHROMASIA BLD QL SMEAR: ABNORMAL
POTASSIUM SERPL-SCNC: 3.9 MMOL/L (ref 3.5–5.1)
PROCALCITONIN SERPL IA-MCNC: 0.21 NG/ML (ref 0–0.15)
RBC # BLD AUTO: 3.03 M/UL (ref 4–5.2)
SLIDE REVIEW: ABNORMAL
SMUDGE CELLS BLD QL SMEAR: PRESENT
SODIUM SERPL-SCNC: 144 MMOL/L (ref 136–145)
TOXIC GRANULES BLD QL SMEAR: PRESENT
WBC # BLD AUTO: 6.1 K/UL (ref 4–11)

## 2024-08-18 PROCEDURE — 2580000003 HC RX 258: Performed by: INTERNAL MEDICINE

## 2024-08-18 PROCEDURE — 80048 BASIC METABOLIC PNL TOTAL CA: CPT

## 2024-08-18 PROCEDURE — 6360000002 HC RX W HCPCS: Performed by: INTERNAL MEDICINE

## 2024-08-18 PROCEDURE — 1200000000 HC SEMI PRIVATE

## 2024-08-18 PROCEDURE — 6370000000 HC RX 637 (ALT 250 FOR IP): Performed by: INTERNAL MEDICINE

## 2024-08-18 PROCEDURE — 85025 COMPLETE CBC W/AUTO DIFF WBC: CPT

## 2024-08-18 PROCEDURE — 84145 PROCALCITONIN (PCT): CPT

## 2024-08-18 PROCEDURE — 36415 COLL VENOUS BLD VENIPUNCTURE: CPT

## 2024-08-18 PROCEDURE — 83735 ASSAY OF MAGNESIUM: CPT

## 2024-08-18 RX ADMIN — SODIUM CHLORIDE, PRESERVATIVE FREE 10 ML: 5 INJECTION INTRAVENOUS at 19:45

## 2024-08-18 RX ADMIN — ASPIRIN 81 MG: 81 TABLET, COATED ORAL at 09:52

## 2024-08-18 RX ADMIN — SUCRALFATE 1 G: 1 TABLET ORAL at 17:55

## 2024-08-18 RX ADMIN — TACROLIMUS 0.5 MG: 0.5 CAPSULE ORAL at 19:45

## 2024-08-18 RX ADMIN — SUCRALFATE 1 G: 1 TABLET ORAL at 23:44

## 2024-08-18 RX ADMIN — METOPROLOL TARTRATE 200 MG: 50 TABLET, FILM COATED ORAL at 09:52

## 2024-08-18 RX ADMIN — LEVOTHYROXINE SODIUM 112 MCG: 0.11 TABLET ORAL at 06:16

## 2024-08-18 RX ADMIN — SODIUM CHLORIDE, PRESERVATIVE FREE 10 ML: 5 INJECTION INTRAVENOUS at 09:52

## 2024-08-18 RX ADMIN — ATORVASTATIN CALCIUM 20 MG: 20 TABLET, FILM COATED ORAL at 19:45

## 2024-08-18 RX ADMIN — SUCRALFATE 1 G: 1 TABLET ORAL at 13:27

## 2024-08-18 RX ADMIN — ALLOPURINOL 100 MG: 100 TABLET ORAL at 09:52

## 2024-08-18 RX ADMIN — SUCRALFATE 1 G: 1 TABLET ORAL at 06:16

## 2024-08-18 RX ADMIN — SUCRALFATE 1 G: 1 TABLET ORAL at 00:17

## 2024-08-18 RX ADMIN — GANCICLOVIR SODIUM 40 MG: 50 INJECTION, POWDER, LYOPHILIZED, FOR SOLUTION INTRAVENTRICULAR at 15:24

## 2024-08-18 RX ADMIN — PROMETHAZINE HYDROCHLORIDE 12.5 MG: 25 INJECTION INTRAMUSCULAR; INTRAVENOUS at 00:17

## 2024-08-18 RX ADMIN — FAMOTIDINE 20 MG: 20 TABLET ORAL at 09:52

## 2024-08-18 RX ADMIN — TACROLIMUS 0.5 MG: 0.5 CAPSULE ORAL at 09:52

## 2024-08-18 RX ADMIN — METOPROLOL TARTRATE 200 MG: 50 TABLET, FILM COATED ORAL at 19:45

## 2024-08-18 ASSESSMENT — PAIN SCALES - GENERAL
PAINLEVEL_OUTOF10: 0
PAINLEVEL_OUTOF10: 0

## 2024-08-18 NOTE — PLAN OF CARE
Problem: Safety - Adult  Goal: Free from fall injury  8/18/2024 0758 by Elicia oMnte RN  Outcome: Progressing  8/18/2024 0200 by Kendra Fleming RN  Outcome: Progressing     Problem: Neurosensory - Adult  Goal: Achieves stable or improved neurological status  8/18/2024 0758 by Elicia Monte RN  Outcome: Progressing  8/18/2024 0200 by Kendra Fleming RN  Outcome: Progressing  Goal: Remains free of injury related to seizures activity  8/18/2024 0758 by Elicia Monte RN  Outcome: Progressing  8/18/2024 0200 by Kendra Fleming RN  Outcome: Progressing  Goal: Achieves maximal functionality and self care  8/18/2024 0758 by Elicia Monte RN  Outcome: Progressing  8/18/2024 0200 by Kendra Fleming RN  Outcome: Progressing     Problem: Respiratory - Adult  Goal: Achieves optimal ventilation and oxygenation  8/18/2024 0758 by Elicia Monte RN  Outcome: Progressing  8/18/2024 0200 by Kendra Fleming RN  Outcome: Progressing     Problem: Cardiovascular - Adult  Goal: Maintains optimal cardiac output and hemodynamic stability  8/18/2024 0758 by Elicia Monte RN  Outcome: Progressing  8/18/2024 0200 by Kendra Fleming RN  Outcome: Progressing  Goal: Absence of cardiac dysrhythmias or at baseline  8/18/2024 0758 by Elicia Monte RN  Outcome: Progressing  8/18/2024 0200 by Kendra Fleming RN  Outcome: Progressing     Problem: Skin/Tissue Integrity - Adult  Goal: Skin integrity remains intact  8/18/2024 0758 by Elicia Monte RN  Outcome: Progressing  8/18/2024 0200 by Kendra Fleming RN  Outcome: Progressing  Goal: Incisions, wounds, or drain sites healing without S/S of infection  8/18/2024 0758 by Elicia Monte RN  Outcome: Progressing  8/18/2024 0200 by Kendra Fleming RN  Outcome: Progressing  Goal: Oral mucous membranes remain intact  8/18/2024 0758 by Elicia Monte RN  Outcome: Progressing  8/18/2024 0200 by Kendra Fleming RN  Outcome: Progressing     Problem:

## 2024-08-18 NOTE — PROGRESS NOTES
[facility] Note    Patient Active Problem List   Diagnosis    IMMANUEL (acute kidney injury) (HCC)    Pancytopenia (HCC)    High cholesterol    Hypothyroid    Diarrhea    Overweight (BMI 25.0-29.9)    Mixed hyperlipidemia    History of nephrotic syndrome    History of Clostridioides difficile colitis    Essential hypertension    Hypocalcemia    Hypomagnesemia    History of renal transplant    Cytomegalovirus (CMV) viremia (HCC)    Abdominal pain, epigastric    Nausea       Past Medical History:   has a past medical history of Clostridium difficile diarrhea, Esophageal reflux, Hyperlipidemia, Hypertension, Nephropathy, membranous, Nephrotic syndrome, and Thyroid disease.    Past Social History:   reports that she has never smoked. She has never used smokeless tobacco. She reports that she does not drink alcohol and does not use drugs.    Subjective:    Diarrhea better      No hypotension.    Improved urine output.  Medications reviewed.    On ganciclovir.    Appreciate urology assistance hydronephrosis of the transplanted kidney          Objective:    1.2L urine output past 24H.  1.8 L positive since admission  /84   Pulse 71   Temp 98.5 °F (36.9 °C) (Oral)   Resp 14   Ht 1.626 m (5' 4.02\")   Wt 78.4 kg (172 lb 13.5 oz)   SpO2 91%   BMI 29.65 kg/m²     Wt Readings from Last 3 Encounters:   08/18/24 78.4 kg (172 lb 13.5 oz)   07/30/24 78.5 kg (173 lb)   06/11/24 83 kg (183 lb)       BP Readings from Last 3 Encounters:   08/18/24 138/84   07/30/24 (!) 145/94   06/11/24 134/87     Chest- clear  Heart-regular  Abd-soft  Ext-1+ edema    Labs  Hemoglobin   Date Value Ref Range Status   08/18/2024 8.3 (L) 12.0 - 16.0 g/dL Final     Hematocrit   Date Value Ref Range Status   08/18/2024 25.0 (L) 36.0 - 48.0 % Final     WBC   Date Value Ref Range Status   08/18/2024 6.1 4.0 - 11.0 K/uL Final     Platelets   Date Value Ref Range Status   08/18/2024 120 (L) 135  thought to be from HTN.   Pyuria-on Cipro.  Urine culture negative.  Anemia with leukopenia and thrombocytopenia-bone marrow biopsy done.  Hematology following.  CMV PCR detected.    High risk.     Jaycob Lentz MD

## 2024-08-18 NOTE — PROGRESS NOTES
Note    72 yo female      Hx renal transplant 11 years ago  Admitted with diarrhea, CMV positive   IMMANUEL  Pancytopenia   Urology consulted for hydronephrosis of transplanted kidney   Afvss. Cr 3.8 with baseline cr low 2's.   CT 8/12 shows both native kidneys atrophic with right pelvic kidney grossly normal with normal bladder. Follow up USBHASH 8/15 mild hydro of transplanted kidney.   She has no specific  complaints today.   ==  Asymptomatic from  standpoint  Cr continues to improve now 2.5 from 2.9 from 3.8.   Afvss    Reccs  Reviewed with attending. CT showed no source of obstruction or hydronephrosis. No mass or stone. The very mild hydro on SUBHASH is probably from reflux. Bladder scan and could consider nagy placement, but will hold off on placing a stent for now. Trend cr - this continues to improve. Call with questions. Stable today from a urology standpoint.    Signed:  Javier Andersen PA-C  8/18/24

## 2024-08-18 NOTE — PLAN OF CARE
Problem: Safety - Adult  Goal: Free from fall injury  8/18/2024 0200 by Kendra Fleming RN  Outcome: Progressing       Problem: Cardiovascular - Adult  Goal: Maintains optimal cardiac output and hemodynamic stability  8/18/2024 0200 by Kendra Fleming RN  Outcome: Progressing     Problem: Skin/Tissue Integrity - Adult  Goal: Skin integrity remains intact  8/18/2024 0200 by Kendra Fleming RN  Outcome: Progressing  Goal: Oral mucous membranes remain intact  8/18/2024 0200 by Kendra Fleming RN  Outcome: Progressing     Problem: Musculoskeletal - Adult  Goal: Return mobility to safest level of function  8/18/2024 0200 by Kendra Fleming RN  Outcome: Progressing  Goal: Return ADL status to a safe level of function  8/18/2024 0200 by Kendra Fleming RN  Outcome: Progressing     Problem: Gastrointestinal - Adult  Goal: Minimal or absence of nausea and vomiting  8/18/2024 0200 by Kendra Fleming RN  Outcome: Progressing  Goal: Maintains or returns to baseline bowel function  8/18/2024 0200 by Kendra Fleming RN  Outcome: Progressing  Goal: Maintains adequate nutritional intake  8/18/2024 0200 by Kendra Fleming RN  Outcome: Progressing     Problem: Infection - Adult  Goal: Absence of infection at discharge  8/18/2024 0200 by Kendra Fleming RN  Outcome: Progressing  Goal: Absence of infection during hospitalization  8/18/2024 0200 by Kendra Fleming RN  Outcome: Progressing  Goal: Absence of fever/infection during anticipated neutropenic period  8/18/2024 0200 by Kendra Fleming RN  Outcome: Progressing     Goal: Hemodynamic stability and optimal renal function maintained  8/18/2024 0200 by Kendra Fleming RN  Outcome: Progressing     Problem: Hematologic - Adult  Goal: Maintains hematologic stability  8/18/2024 0200 by Kendra Fleming RN  Outcome: Progressing     Problem: Nutrition Deficit:  Goal: Optimize nutritional status  8/18/2024 0200 by Kendra Fleming

## 2024-08-18 NOTE — PROGRESS NOTES
Progress Note - Dr. Stein - Internal Medicine  PCP: Mehran Stein MD [id number] [facility] [address] [phone]    Hospital Day: 6  Code Status: Full Code  Current Diet: ADULT DIET; Regular        CC: follow up on medical issues    Subjective:   Jessica Reyes is a 73 y.o. female.    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     HENT: Negative for dental problem, nosebleeds and rhinorrhea.      Eyes: Negative for photophobia and visual disturbance.     Respiratory: Negative for cough, chest tightness and shortness of breath.      Cardiovascular: Negative for chest pain and leg swelling.     Gastrointestinal: positive for diarrhea, negative for nausea and vomiting.     Endocrine: Negative for polydipsia and polyphagia.     Genitourinary: Negative for frequency, hematuria and urgency.     Musculoskeletal: Negative for back pain and myalgias.     Skin: Negative for rash.     Allergic/Immunologic: Negative for food allergies.     Neurological: Negative for dizziness, seizures, syncope and facial asymmetry.     Hematological: Negative for adenopathy.     Psychiatric/Behavioral: Negative for dysphoric mood. The patient is not nervous/anxious.        I have reviewed the patient's medical and social history in detail and updated the computerized patient record.  To recap: She  has a past medical history of Clostridium difficile diarrhea, Esophageal reflux, Hyperlipidemia, Hypertension, Nephropathy, membranous, Nephrotic syndrome, and Thyroid disease.. She  has a past surgical history that includes Abdomen surgery; Tonsillectomy; Knee arthroscopy; Elbow arthroscopy; Kidney transplant (2013); Appendectomy;  Exam: abd pain FINDINGS: Lower Chest: Subtle area of increased attenuation in the anterior left mid lung zone likely representing fibrosis or scarring.  No worrisome nodule, no pneumothorax or infiltrate. Organs: Liver: The liver is slightly hypoplastic with slight lobulation.  This may reflect mild cirrhosis.  There is no significant varicosities. Gallbladder: Normal Pancreas: Pancreas is atrophic and fatty replaced. Adrenal glands: Both adrenal glands are normal. Kidneys: Both native kidneys are atrophic.  Right pelvic kidney is grossly normal in attenuation nominal worrisome mass identified. Spleen: The spleen is upper limits of normal but otherwise normal. GI/Bowel: Stomach: Moderate hiatal hernia. Small bowel: Unremarkable. Colon: Unremarkable. Pelvis: The bladder is normal.  The uterus is atrophic appropriate for age. Peritoneum/Retroperitoneum: No retroperitoneal adenopathy, free fluid or free air. Bones/Soft Tissues: Moderate degenerative change of the spine.  No lytic or blastic lesion.  Grade 2 L5 on S1 anterior spondylolisthesis.     No acute abdominal or pelvic finding. Atrophic right and left native kidneys with unremarkable right pelvic kidney. Hiatal hernia. Slightly hypoplastic liver may reflect mild cirrhosis in the correct clinical and laboratory setting.       Lab Results   Component Value Date/Time    GLUCOSE 112 08/18/2024 05:25 AM     No results found for: \"POCGLU\"  /81   Pulse 72   Temp 98.2 °F (36.8 °C) (Oral)   Resp 17   Ht 1.626 m (5' 4.02\")   Wt 78.4 kg (172 lb 13.5 oz)   SpO2 91%   BMI 29.65 kg/m²     Assessment and Plan:  Principal Problem:    IMMANUEL (acute kidney injury) (HCC) -Established problem. Stable.  Creat 2.5  Plan:  cont mgmt per renal  Active Problems:    Pancytopenia (HCC) -Established problem. Stable.  Counts remain low but are improving, s/p filgrastim  Plan: BMBx pending. Per Dr Swenson    High cholesterol -Established problem. Stable.    Plan: Continue present

## 2024-08-18 NOTE — PROGRESS NOTES
Occupational Therapy  Jessica Reyes      2440: Attempted to see pt for OT tx session, pt resting in bed with visitor present. Pt declines session at this time citing fatigue and desire to visit with guest, agreeable to f/u tomorrow per OT POC.   Pt and visitor left with all needs met, no charge.    Antonia Alcantara, [id number]

## 2024-08-18 NOTE — PROGRESS NOTES
Patients head to toe assessment completed. Vital signs WNL.  call light within reach. Scheduled medications given per MAR. Patient denies any pain at the moment. Patient resting in bed.

## 2024-08-19 LAB
ANION GAP SERPL CALCULATED.3IONS-SCNC: 10 MMOL/L (ref 3–16)
BASOPHILS # BLD: 0 K/UL (ref 0–0.2)
BASOPHILS NFR BLD: 0.1 %
BUN SERPL-MCNC: 44 MG/DL (ref 7–20)
CALCIUM SERPL-MCNC: 8.5 MG/DL (ref 8.3–10.6)
CHLORIDE SERPL-SCNC: 114 MMOL/L (ref 99–110)
CO2 SERPL-SCNC: 17 MMOL/L (ref 21–32)
CREAT SERPL-MCNC: 2.5 MG/DL (ref 0.6–1.2)
DEPRECATED RDW RBC AUTO: 15.8 % (ref 12.4–15.4)
EOSINOPHIL # BLD: 0.1 K/UL (ref 0–0.6)
EOSINOPHIL NFR BLD: 1.5 %
GFR SERPLBLD CREATININE-BSD FMLA CKD-EPI: 20 ML/MIN/{1.73_M2}
GLUCOSE SERPL-MCNC: 138 MG/DL (ref 70–99)
HCT VFR BLD AUTO: 25.7 % (ref 36–48)
HGB BLD-MCNC: 8.4 G/DL (ref 12–16)
LYMPHOCYTES # BLD: 1 K/UL (ref 1–5.1)
LYMPHOCYTES NFR BLD: 19.7 %
MAGNESIUM SERPL-MCNC: 1.9 MG/DL (ref 1.8–2.4)
MCH RBC QN AUTO: 27.1 PG (ref 26–34)
MCHC RBC AUTO-ENTMCNC: 32.5 G/DL (ref 31–36)
MCV RBC AUTO: 83.4 FL (ref 80–100)
MONOCYTES # BLD: 0.7 K/UL (ref 0–1.3)
MONOCYTES NFR BLD: 13.7 %
NEUTROPHILS # BLD: 3.2 K/UL (ref 1.7–7.7)
NEUTROPHILS NFR BLD: 65 %
PLATELET # BLD AUTO: 105 K/UL (ref 135–450)
PMV BLD AUTO: 9.2 FL (ref 5–10.5)
POTASSIUM SERPL-SCNC: 3.8 MMOL/L (ref 3.5–5.1)
RBC # BLD AUTO: 3.08 M/UL (ref 4–5.2)
SODIUM SERPL-SCNC: 141 MMOL/L (ref 136–145)
WBC # BLD AUTO: 4.9 K/UL (ref 4–11)

## 2024-08-19 PROCEDURE — 2580000003 HC RX 258: Performed by: INTERNAL MEDICINE

## 2024-08-19 PROCEDURE — 6360000002 HC RX W HCPCS: Performed by: INTERNAL MEDICINE

## 2024-08-19 PROCEDURE — 97116 GAIT TRAINING THERAPY: CPT

## 2024-08-19 PROCEDURE — 85025 COMPLETE CBC W/AUTO DIFF WBC: CPT

## 2024-08-19 PROCEDURE — 36415 COLL VENOUS BLD VENIPUNCTURE: CPT

## 2024-08-19 PROCEDURE — 6370000000 HC RX 637 (ALT 250 FOR IP): Performed by: INTERNAL MEDICINE

## 2024-08-19 PROCEDURE — 83735 ASSAY OF MAGNESIUM: CPT

## 2024-08-19 PROCEDURE — 1200000000 HC SEMI PRIVATE

## 2024-08-19 PROCEDURE — 80048 BASIC METABOLIC PNL TOTAL CA: CPT

## 2024-08-19 PROCEDURE — 99233 SBSQ HOSP IP/OBS HIGH 50: CPT | Performed by: INTERNAL MEDICINE

## 2024-08-19 RX ORDER — SODIUM BICARBONATE 650 MG/1
1300 TABLET ORAL 2 TIMES DAILY
Status: DISCONTINUED | OUTPATIENT
Start: 2024-08-19 | End: 2024-08-21 | Stop reason: HOSPADM

## 2024-08-19 RX ADMIN — TACROLIMUS 0.5 MG: 0.5 CAPSULE ORAL at 09:30

## 2024-08-19 RX ADMIN — LEVOTHYROXINE SODIUM 112 MCG: 0.11 TABLET ORAL at 05:31

## 2024-08-19 RX ADMIN — METOPROLOL TARTRATE 200 MG: 50 TABLET, FILM COATED ORAL at 09:30

## 2024-08-19 RX ADMIN — SUCRALFATE 1 G: 1 TABLET ORAL at 17:32

## 2024-08-19 RX ADMIN — SODIUM BICARBONATE 1300 MG: 650 TABLET ORAL at 09:34

## 2024-08-19 RX ADMIN — SUCRALFATE 1 G: 1 TABLET ORAL at 23:30

## 2024-08-19 RX ADMIN — FAMOTIDINE 20 MG: 20 TABLET ORAL at 09:31

## 2024-08-19 RX ADMIN — GANCICLOVIR SODIUM 40 MG: 50 INJECTION, POWDER, LYOPHILIZED, FOR SOLUTION INTRAVENTRICULAR at 17:34

## 2024-08-19 RX ADMIN — SODIUM CHLORIDE, PRESERVATIVE FREE 10 ML: 5 INJECTION INTRAVENOUS at 21:51

## 2024-08-19 RX ADMIN — METOPROLOL TARTRATE 200 MG: 50 TABLET, FILM COATED ORAL at 21:49

## 2024-08-19 RX ADMIN — ATORVASTATIN CALCIUM 20 MG: 20 TABLET, FILM COATED ORAL at 21:49

## 2024-08-19 RX ADMIN — ALLOPURINOL 100 MG: 100 TABLET ORAL at 09:31

## 2024-08-19 RX ADMIN — ASPIRIN 81 MG: 81 TABLET, COATED ORAL at 09:31

## 2024-08-19 RX ADMIN — TACROLIMUS 0.5 MG: 0.5 CAPSULE ORAL at 21:49

## 2024-08-19 RX ADMIN — SODIUM CHLORIDE, PRESERVATIVE FREE 10 ML: 5 INJECTION INTRAVENOUS at 09:31

## 2024-08-19 RX ADMIN — SUCRALFATE 1 G: 1 TABLET ORAL at 12:20

## 2024-08-19 RX ADMIN — SODIUM BICARBONATE 1300 MG: 650 TABLET ORAL at 21:49

## 2024-08-19 RX ADMIN — SUCRALFATE 1 G: 1 TABLET ORAL at 05:32

## 2024-08-19 ASSESSMENT — ENCOUNTER SYMPTOMS
CONSTIPATION: 0
NAUSEA: 0
EYE DISCHARGE: 0
RHINORRHEA: 0
ABDOMINAL PAIN: 0
SINUS PAIN: 0
SORE THROAT: 0
WHEEZING: 0
SINUS PRESSURE: 0
COUGH: 0
BACK PAIN: 0
SHORTNESS OF BREATH: 0
EYE REDNESS: 0
DIARRHEA: 0

## 2024-08-19 ASSESSMENT — PAIN SCALES - GENERAL
PAINLEVEL_OUTOF10: 0

## 2024-08-19 NOTE — PLAN OF CARE
Problem: Safety - Adult  Goal: Free from fall injury  8/19/2024 1307 by Elicia Monte RN  Outcome: Progressing  8/18/2024 2319 by Marco Call RN  Outcome: Progressing     Problem: Neurosensory - Adult  Goal: Achieves stable or improved neurological status  8/19/2024 1307 by Elicia Monte RN  Outcome: Progressing  8/18/2024 2319 by Marco Call RN  Outcome: Progressing  Flowsheets (Taken 8/18/2024 2000)  Achieves stable or improved neurological status:   Assess for and report changes in neurological status   Initiate measures to prevent increased intracranial pressure   Maintain blood pressure and fluid volume within ordered parameters to optimize cerebral perfusion and minimize risk of hemorrhage  Goal: Remains free of injury related to seizures activity  8/19/2024 1307 by Elicia Monte RN  Outcome: Progressing  8/18/2024 2319 by Marco Call RN  Outcome: Progressing  Goal: Achieves maximal functionality and self care  8/19/2024 1307 by Elicia Monte RN  Outcome: Progressing  8/18/2024 2319 by Marco Call RN  Outcome: Progressing     Problem: Respiratory - Adult  Goal: Achieves optimal ventilation and oxygenation  8/19/2024 1307 by Elicia Monte RN  Outcome: Progressing  8/18/2024 2319 by Marco Call RN  Outcome: Progressing  Flowsheets (Taken 8/18/2024 2000)  Achieves optimal ventilation and oxygenation:   Assess for changes in respiratory status   Assess for changes in mentation and behavior     Problem: Cardiovascular - Adult  Goal: Maintains optimal cardiac output and hemodynamic stability  8/19/2024 1307 by Elicia Monte RN  Outcome: Progressing  8/18/2024 2319 by Marco Call RN  Outcome: Progressing  Flowsheets (Taken 8/18/2024 2000)  Maintains optimal cardiac output and hemodynamic stability:   Monitor blood pressure and heart rate   Monitor urine output and notify Licensed Independent Practitioner for values outside of normal range   Assess

## 2024-08-19 NOTE — PROGRESS NOTES
ONCOLOGY HEMATOLOGY CARE PROGRESS NOTE      SUBJECTIVE:  Feeling well this morning.  Denies respiratory or gastrointestinal symptoms.    ROS:     Constitutional:  No weight loss, No fever, No chills, No night sweats.   Eyes:  No impairment or change in vision  ENT / Mouth:  No pain, abnormal ulceration, bleeding, nasal drip or change in voice or hearing  Cardiovascular:  No chest pain, palpitations, new edema, or calf discomfort  Respiratory:  No pain, hemoptysis, change to breathing  Gastrointestinal: Persistent diarrhea.  Urinary:  No pain, bleeding or change in continence  Genitalia: No pain, bleeding or discharge  Musculoskeletal:  No redness, pain, edema or weakness  Skin:  No pruritus, rash, change to nodules or lesions  Neurologic:  No discomfort, change in mental status, speech, sensory or motor activity  Psychiatric:  No change in concentration or change to affect or mood  Endocrine:  No hot flashes, increased thirst, or change to urine production  Hematologic: No petechiae, ecchymosis or bleeding  Lymphatic:  No lymphadenopathy or lymphedema  Allergy / Immunologic:  No eczema, hives, frequent or recurrent infections    OBJECTIVE        Physical    VITALS:  Patient Vitals for the past 24 hrs:   BP Temp Temp src Pulse Resp SpO2 Weight   08/19/24 0540 (!) 142/90 98.1 °F (36.7 °C) Oral 68 16 91 % --   08/19/24 0536 -- -- -- -- -- -- 78.1 kg (172 lb 2.9 oz)   08/19/24 0000 128/76 98 °F (36.7 °C) Oral 73 17 93 % --   08/18/24 1943 133/80 98.2 °F (36.8 °C) Oral 78 17 91 % --   08/18/24 1300 132/85 98.7 °F (37.1 °C) Oral 71 14 93 % --   08/18/24 0945 138/84 98.5 °F (36.9 °C) Oral 71 14 91 % --       24HR INTAKE/OUTPUT:  No intake or output data in the 24 hours ending 08/19/24 0801      CONSTITUTIONAL: awake, alert, cooperative, no apparent distress.  Color has improved.  EYES: pupils equal, round and reactive to light, sclera clear and conjunctiva normal  ENT: Normocephalic,  followed. Sterile gowns, masks, hats and gloves utilized for maximal  sterile barrier. Axial images were obtained through the iliac bones using CT  guidance and a suitable skin site was prepped and draped in sterile fashion.  Local anesthesia was achieved with lidocaine.  An 11 gauge Bundle bone  marrow biopsy needle was advanced into the right iliac bone and approximately  15 mL of bone marrow aspirate was obtained.  A single core biopsy specimen  was obtained and the patient tolerated the procedure well.    Estimated blood loss: Less than 5 cc    Automated exposure control, iterative reconstruction, and/or weight based  adjustment of the mA/kV was utilized to reduce the radiation dose to as low  as reasonably achievable.    DLP: 101.16 mGy-cm  Impression: Successful CT guided bone marrow aspiration and core biopsy of the right  iliac bone.      Problem List  Patient Active Problem List   Diagnosis    IMMANUEL (acute kidney injury) (HCC)    Pancytopenia (HCC)    High cholesterol    Hypothyroid    Diarrhea    Overweight (BMI 25.0-29.9)    Mixed hyperlipidemia    History of nephrotic syndrome    History of Clostridioides difficile colitis    Essential hypertension    Hypocalcemia    Hypomagnesemia    History of renal transplant    Cytomegalovirus (CMV) viremia (HCC)    Abdominal pain, epigastric    Nausea       ASSESSMENT AND PLAN:  Pancytopenia likely due to myelodysplasia.  No clear evidence of B12, folate or iron deficiency.  SPEP negative for monoclonal gammopathy.  Bone marrow aspiration and biopsy on 8/14/2024 showed changes concerning for myelodysplasia.  No evidence of leukemia at present.  Chromosome and FISH studies are pending.  Neutropenia resolved with filgrastim which was discontinued  IMMANUEL on CKD.  Renal function stable.  Diarrhea, resolved at present  History of kidney transplant in early 2013.  On tacrolimus.     Reviewed her labs.  Renal function has remained stable.  She has no leukopenia at present.

## 2024-08-19 NOTE — PROGRESS NOTES
Initial assessment completed- see doc flowsheet. VSS. No complaints of pain/discomfort. No S/S of respiratory distress/SOB. Independent in room. Able to make needs known. Care ongoing.

## 2024-08-19 NOTE — CARE COORDINATION
SW again met with patient today and discussed HHC and walker recommendations from PT/OT.  Pt declined both HHC and walker.  Stated she does not feel she needs them at this time.  SW will continue to follow but have not identified any other needs at this time.    Electronically signed by GARY Olivia, MAYKEL on 8/19/2024 at 3:06 PM

## 2024-08-19 NOTE — PROGRESS NOTES
external ears normal, nares midline    Neck: no carotid bruit, supple, symmetrical, trachea midline and thyroid not enlarged, symmetric, no tenderness/mass/nodules     Respiratory: clear to auscultation and percussion bilaterally with normal respiratory effort    Cardiovascular: normal rate, regular rhythm, normal S1 and S2 and no murmurs    Gastrointestinal: soft, non-tender, non-distended, normal bowel sounds, no masses or organomegaly    Extremities: no clubbing, no edema    Skin:No rashes or nodules noted.    Neurologic:negative         Labs:  Lab Results   Component Value Date    WBC 4.9 08/19/2024    HGB 8.4 (L) 08/19/2024    HCT 25.7 (L) 08/19/2024     (L) 08/19/2024    CHOL 125 05/11/2019    TRIG 121 05/11/2019    HDL 39 (L) 05/11/2019    ALT 10 08/12/2024    AST 21 08/12/2024     08/19/2024    K 3.8 08/19/2024     (H) 08/19/2024    CREATININE 2.5 (H) 08/19/2024    BUN 44 (H) 08/19/2024    CO2 17 (L) 08/19/2024    TSH 3.72 11/06/2020    INR 1.14 11/29/2010    LABA1C 6.2 06/08/2024     Lab Results   Component Value Date    CKTOTAL 50 08/13/2024       Recent Imaging Results are Reviewed:  CT ABDOMEN PELVIS WO CONTRAST Additional Contrast? None    Result Date: 8/12/2024  EXAMINATION: CT OF THE ABDOMEN AND PELVIS WITHOUT CONTRAST 8/12/2024 1:08 pm TECHNIQUE: CT of the abdomen and pelvis was performed without the administration of intravenous contrast. Multiplanar reformatted images are provided for review. Automated exposure control, iterative reconstruction, and/or weight based adjustment of the mA/kV was utilized to reduce the radiation dose to as low as reasonably achievable. COMPARISON: None. HISTORY: ORDERING SYSTEM PROVIDED HISTORY: abd pain TECHNOLOGIST PROVIDED HISTORY: Reason for exam:->abd pain Additional Contrast?->None Decision Support Exception - unselect if not a suspected or confirmed emergency medical condition->Emergency Medical Condition (MA) Reason for Exam: abd pain  FINDINGS: Lower Chest: Subtle area of increased attenuation in the anterior left mid lung zone likely representing fibrosis or scarring.  No worrisome nodule, no pneumothorax or infiltrate. Organs: Liver: The liver is slightly hypoplastic with slight lobulation.  This may reflect mild cirrhosis.  There is no significant varicosities. Gallbladder: Normal Pancreas: Pancreas is atrophic and fatty replaced. Adrenal glands: Both adrenal glands are normal. Kidneys: Both native kidneys are atrophic.  Right pelvic kidney is grossly normal in attenuation nominal worrisome mass identified. Spleen: The spleen is upper limits of normal but otherwise normal. GI/Bowel: Stomach: Moderate hiatal hernia. Small bowel: Unremarkable. Colon: Unremarkable. Pelvis: The bladder is normal.  The uterus is atrophic appropriate for age. Peritoneum/Retroperitoneum: No retroperitoneal adenopathy, free fluid or free air. Bones/Soft Tissues: Moderate degenerative change of the spine.  No lytic or blastic lesion.  Grade 2 L5 on S1 anterior spondylolisthesis.     No acute abdominal or pelvic finding. Atrophic right and left native kidneys with unremarkable right pelvic kidney. Hiatal hernia. Slightly hypoplastic liver may reflect mild cirrhosis in the correct clinical and laboratory setting.       Lab Results   Component Value Date/Time    GLUCOSE 138 08/19/2024 05:17 AM     No results found for: \"POCGLU\"  BP (!) 142/90   Pulse 68   Temp 98.1 °F (36.7 °C) (Oral)   Resp 16   Ht 1.626 m (5' 4.02\")   Wt 78.1 kg (172 lb 2.9 oz)   SpO2 91%   BMI 29.54 kg/m²     Assessment and Plan:  Principal Problem:    IMMANUEL (acute kidney injury) (HCC) -Established problem. Stable.  Creat 2.5  Plan:  cont mgmt per renal  Active Problems:    Pancytopenia (HCC) -Established problem. Stable.  Counts remain low but are improving, s/p filgrastim  Plan: BMBx pending. Per Dr Swenson    High cholesterol -Established problem. Stable.    Plan: Continue present orders/plan.

## 2024-08-19 NOTE — PROGRESS NOTES
Infectious Diseases   Progress Note      Admission Date: 8/12/2024  Hospital Day: Hospital Day: 8   Attending: Mehran Stein MD  Date of service: 8/19/2024     Chief complaint/ Reason for consult:     Diarrhea for 1 week  Worsening pancytopenia  CMV viremia with CMV quantitative viral load of 3140 international unit/mL (3.4 logs) on 8/13/2024  History of renal transplantation  Acute kidney injury on chronic kidney disease with serum creatinine of 3.8  Hypomagnesemia with serum magnesium of 1.7  Hypocalcemia with serum calcium of 8.1 today    Microbiology:        I have reviewed allavailable micro lab data and cultures    Blood culture (2/2) - collected on 8/16/2024: Negative  Stool GI PCR and stool for C. difficile: Collected on 8/12/2024: Negative      Antibiotics and immunizations:       Current antibiotics: All antibiotics and their doses were reviewed by me    Recent Abx Admin                     ganciclovir (CYTOVENE) 40 mg in sodium chloride 0.9 % 100 mL IVPB (mg) 40 mg New Bag 08/18/24 1524                      Immunization History: All immunization history was reviewed by me today.      There is no immunization history on file for this patient.    Known drug allergies:     All allergies were reviewed and updated    Allergies   Allergen Reactions    Cefuroxime Axetil Swelling     Lips to swell       Social history:     Social History:  All social andepidemiologic history was reviewed and updated by me today as needed.     Tobacco use:   reports that she has never smoked. She has never used smokeless tobacco.  Alcohol use:   reports no history of alcohol use.  Currently lives in: [place] [address]   reports no history of drug use.     COVID VACCINATION AND LAB RESULT RECORDS:     Internal Administration   First Dose      Second Dose           Last COVID Lab SARS-CoV-2 RNA, RT PCR (no units)   Date Value   08/12/2024 NOT DETECTED            Assessment:     The patient is a 73 y.o. old female who  has a  to light.   Neck:      Thyroid: No thyromegaly.   Cardiovascular:      Rate and Rhythm: Normal rate and regular rhythm.      Heart sounds: Normal heart sounds. No murmur heard.     No friction rub.   Pulmonary:      Effort: No respiratory distress.      Breath sounds: No stridor. No wheezing or rales.   Abdominal:      General: Bowel sounds are normal.      Palpations: Abdomen is soft.      Tenderness: There is no abdominal tenderness. There is no guarding or rebound.   Musculoskeletal:         General: No swelling, tenderness or deformity. Normal range of motion.      Cervical back: Normal range of motion and neck supple.      Right lower leg: No edema.      Left lower leg: No edema.   Lymphadenopathy:      Cervical: No cervical adenopathy.   Skin:     General: Skin is warm and dry.      Coloration: Skin is not jaundiced.      Findings: No bruising, erythema or rash.   Neurological:      General: No focal deficit present.      Mental Status: She is alert and oriented to person, place, and time. Mental status is at baseline.      Motor: No abnormal muscle tone.   Psychiatric:         Mood and Affect: Mood normal.         Behavior: Behavior normal.            Lines and drains: All vascular access sites are healthy with no local erythema, discharge or tenderness.      Intake and output:    I/O last 3 completed shifts:  In: 834.1 [P.O.:240; I.V.:5.3; IV Piggyback:588.8]  Out: -     Lab Data:   All available labs and old records have been reviewed by me.    CBC:  Recent Labs     08/17/24  0544 08/18/24  0525 08/19/24  0517   WBC 4.2 6.1 4.9   RBC 2.95* 3.03* 3.08*   HGB 8.1* 8.3* 8.4*   HCT 24.4* 25.0* 25.7*   * 120* 105*   MCV 82.8 82.6 83.4   MCH 27.6 27.4 27.1   MCHC 33.3 33.2 32.5   RDW 14.7 15.5* 15.8*   NRBC  --  1*  --         BMP:  Recent Labs     08/17/24  0544 08/18/24  0525 08/19/24  0517    144 141   K 3.3* 3.9 3.8   * 115* 114*   CO2 18* 17* 17*   BUN 54* 44* 44*   CREATININE 2.9* 2.5*

## 2024-08-19 NOTE — PROGRESS NOTES
[facility] - Inpatient Rehabilitation Department   Phone: [phone]    Physical Therapy    [] Initial Evaluation            [x] Daily Treatment Note         [] Discharge Summary      Patient: Jessica Reyes   : 1950   MRN: [id number]   Date of Service:  2024  Admitting Diagnosis: IMMANUEL (acute kidney injury) (HCC)  Current Admission Summary: Per ED provider note \"Jessica Reyes is a 73 y.o. female who presents to the emergency department complaining of diarrhea for 1 week.  She does report some nausea and occasional epigastric discomfort.  She describes the diarrhea as watery.  She denies recent travel, surgery, hospitalization, fever, chills, sick exposures, recent antibiotic use, ingestion of seafood,  or spicy food prior to onset of symptoms.  Today, she feels weak and fatigued.  She thinks that she is dehydrated.  Patient does have history of chronic kidney disease and sees Dr. Duffy, nephrologist\".  Past Medical History:  has a past medical history of Clostridium difficile diarrhea, Esophageal reflux, Hyperlipidemia, Hypertension, Nephropathy, membranous, Nephrotic syndrome, and Thyroid disease.  Past Surgical History:  has a past surgical history that includes Abdomen surgery; Tonsillectomy; Knee arthroscopy; Elbow arthroscopy; Kidney transplant (); Appendectomy; and CT BIOPSY BONE MARROW (2024).    Discharge Recommendations: Jessica Reyes scored a 22/24 on the AM-PAC short mobility form. Current research shows that an AM-PAC score of 18 or greater is typically associated with a discharge to the patient's home setting. Based on the patient's AM-PAC score and their current functional mobility deficits, it is recommended that the patient have 2-3 sessions per week of Physical Therapy at d/c to increase the patient's independence.  At this time, this patient demonstrates the endurance and safety to discharge home with home health services and a follow up    WFL  Observation:   General Observation:  Pt supine with HOB elevated at rest. Pt on RA and IV      Subjective  General: Pt is supine with HOB elevated at arrival. Pt agrees to therapy with minimal encouragement.   Pain: 0/10  Pain Interventions: not applicable       Functional Mobility  Bed Mobility:   Supine to Sit: stand by assistance  Sit to Supine: stand by assistance  Scooting: stand by assistance  Comments: HOB elevated  Transfers:  Sit to stand transfer: stand by assistance  Stand to sit transfer: stand by assistance  Comments:   Ambulation:  Surface:level surface  Assistive Device: no device  Assistance: stand by assistance  Distance: ~40 ft  Gait Mechanics: decreased step length, slightly slow jomar, mild medial lateral sway, no LOB.  Comments:   Stair Mobility:  Number of Steps: 6  Step Height: 6 inch  Hand Rails: (B) handrail  Assistance: stand by assistance  Comments: Step to gait pattern,   Wheelchair Mobility:  No w/c mobility completed on this date.  Comments:  Balance:  Static Sitting Balance: good: independent with functional balance in unsupported position  Dynamic Sitting Balance: good: independent with functional balance in unsupported position  Static Standing Balance: fair (+): maintains balance at SBA/supervision without use of UE support  Dynamic Standing Balance: fair (+): maintains balance at SBA/supervision without use of UE support  Comments:     Other Therapeutic Interventions      Functional Outcomes  AM-PAC Inpatient Mobility Raw Score : 22              Cognition  WFL  Safety Judgement: decreased awareness of need for assistance, decreased awareness of need for safety  Initiation: requires cues for some  Sequencing: requires cues for some  Comments: Pt requires cues for impulsiveness d/t baseline functional mobility  Orientation:    alert and oriented x 4  Command Following:   WFL    Education  Barriers To Learning: none  Patient Education: patient educated on goals, PT role and

## 2024-08-19 NOTE — PROGRESS NOTES
Assessment completed: VSS. No signs SOB, no pain, fall precautions in place. Patient resting comfortably. Bed in lowest position, call light within. Will continue to monitor patient care.

## 2024-08-19 NOTE — PROGRESS NOTES
[facility] Note    Patient Active Problem List   Diagnosis    IMMANUEL (acute kidney injury) (HCC)    Pancytopenia (HCC)    High cholesterol    Hypothyroid    Diarrhea    Overweight (BMI 25.0-29.9)    Mixed hyperlipidemia    History of nephrotic syndrome    History of Clostridioides difficile colitis    Essential hypertension    Hypocalcemia    Hypomagnesemia    History of renal transplant    Cytomegalovirus (CMV) viremia (HCC)    Abdominal pain, epigastric    Nausea       Past Medical History:   has a past medical history of Clostridium difficile diarrhea, Esophageal reflux, Hyperlipidemia, Hypertension, Nephropathy, membranous, Nephrotic syndrome, and Thyroid disease.    Past Social History:   reports that she has never smoked. She has never used smokeless tobacco. She reports that she does not drink alcohol and does not use drugs.    Subjective:    Diarrhea better  Urinating well    Objective:    /72   Pulse 71   Temp 98.4 °F (36.9 °C) (Oral)   Resp 16   Ht 1.626 m (5' 4.02\")   Wt 78.1 kg (172 lb 2.9 oz)   SpO2 93%   BMI 29.54 kg/m²     Wt Readings from Last 3 Encounters:   08/19/24 78.1 kg (172 lb 2.9 oz)   07/30/24 78.5 kg (173 lb)   06/11/24 83 kg (183 lb)       BP Readings from Last 3 Encounters:   08/19/24 118/72   07/30/24 (!) 145/94   06/11/24 134/87     Chest- clear  Heart-regular  Abd-soft  Ext-1+ edema    Labs  Hemoglobin   Date Value Ref Range Status   08/19/2024 8.4 (L) 12.0 - 16.0 g/dL Final     Hematocrit   Date Value Ref Range Status   08/19/2024 25.7 (L) 36.0 - 48.0 % Final     WBC   Date Value Ref Range Status   08/19/2024 4.9 4.0 - 11.0 K/uL Final     Platelets   Date Value Ref Range Status   08/19/2024 105 (L) 135 - 450 K/uL Final     Lab Results   Component Value Date    CREATININE 2.5 (H) 08/19/2024    BUN 44 (H) 08/19/2024     08/19/2024    K 3.8 08/19/2024     (H) 08/19/2024    CO2 17 (L) 08/19/2024     Tacro

## 2024-08-19 NOTE — PLAN OF CARE
vascular access sites hourly  Goal: Incisions, wounds, or drain sites healing without S/S of infection  Outcome: Progressing  Flowsheets  Taken 8/18/2024 2300  Incisions, Wounds, or Drain Sites Healing Without Sign and Symptoms of Infection: TWICE DAILY: Assess and document skin integrity  Taken 8/18/2024 2000  Incisions, Wounds, or Drain Sites Healing Without Sign and Symptoms of Infection: TWICE DAILY: Assess and document skin integrity  Goal: Oral mucous membranes remain intact  Outcome: Progressing     Problem: Musculoskeletal - Adult  Goal: Return mobility to safest level of function  Outcome: Progressing  Goal: Maintain proper alignment of affected body part  Outcome: Progressing  Goal: Return ADL status to a safe level of function  Outcome: Progressing     Problem: Gastrointestinal - Adult  Goal: Minimal or absence of nausea and vomiting  Outcome: Progressing  Flowsheets (Taken 8/18/2024 2000)  Minimal or absence of nausea and vomiting:   Administer IV fluids as ordered to ensure adequate hydration   Maintain NPO status until nausea and vomiting are resolved  Goal: Maintains or returns to baseline bowel function  Outcome: Progressing  Flowsheets (Taken 8/18/2024 2000)  Maintains or returns to baseline bowel function:   Assess bowel function   Encourage oral fluids to ensure adequate hydration  Goal: Maintains adequate nutritional intake  Outcome: Progressing     Problem: Genitourinary - Adult  Goal: Absence of urinary retention  Outcome: Progressing     Problem: Infection - Adult  Goal: Absence of infection at discharge  Outcome: Progressing  Flowsheets (Taken 8/18/2024 2000)  Absence of infection at discharge:   Assess and monitor for signs and symptoms of infection   Monitor lab/diagnostic results   Monitor all insertion sites i.e., indwelling lines, tubes and drains  Goal: Absence of infection during hospitalization  Outcome: Progressing  Flowsheets (Taken 8/18/2024 2000)  Absence of infection during  hospitalization:   Assess and monitor for signs and symptoms of infection   Monitor lab/diagnostic results   Monitor all insertion sites i.e., indwelling lines, tubes and drains  Goal: Absence of fever/infection during anticipated neutropenic period  Outcome: Progressing     Problem: Metabolic/Fluid and Electrolytes - Adult  Goal: Electrolytes maintained within normal limits  Outcome: Progressing  Flowsheets (Taken 8/18/2024 2000)  Electrolytes maintained within normal limits:   Monitor labs and assess patient for signs and symptoms of electrolyte imbalances   Administer electrolyte replacement as ordered  Goal: Hemodynamic stability and optimal renal function maintained  Outcome: Progressing  Goal: Glucose maintained within prescribed range  Outcome: Progressing     Problem: Hematologic - Adult  Goal: Maintains hematologic stability  Outcome: Progressing     Problem: Pain  Goal: Verbalizes/displays adequate comfort level or baseline comfort level  Outcome: Progressing     Problem: Nutrition Deficit:  Goal: Optimize nutritional status  Outcome: Progressing

## 2024-08-20 LAB
ANION GAP SERPL CALCULATED.3IONS-SCNC: 10 MMOL/L (ref 3–16)
BACTERIA BLD CULT ORG #2: NORMAL
BACTERIA BLD CULT: NORMAL
BASOPHILS # BLD: 0 K/UL (ref 0–0.2)
BASOPHILS NFR BLD: 0 %
BUN SERPL-MCNC: 42 MG/DL (ref 7–20)
CALCIUM SERPL-MCNC: 8.1 MG/DL (ref 8.3–10.6)
CHLORIDE SERPL-SCNC: 115 MMOL/L (ref 99–110)
CO2 SERPL-SCNC: 19 MMOL/L (ref 21–32)
CREAT SERPL-MCNC: 2.2 MG/DL (ref 0.6–1.2)
DEPRECATED RDW RBC AUTO: 16.1 % (ref 12.4–15.4)
EOSINOPHIL # BLD: 0.1 K/UL (ref 0–0.6)
EOSINOPHIL NFR BLD: 3 %
GFR SERPLBLD CREATININE-BSD FMLA CKD-EPI: 23 ML/MIN/{1.73_M2}
GLUCOSE SERPL-MCNC: 125 MG/DL (ref 70–99)
HCT VFR BLD AUTO: 25.1 % (ref 36–48)
HGB BLD-MCNC: 8.4 G/DL (ref 12–16)
LYMPHOCYTES # BLD: 0.6 K/UL (ref 1–5.1)
LYMPHOCYTES NFR BLD: 22 %
MAGNESIUM SERPL-MCNC: 1.8 MG/DL (ref 1.8–2.4)
MCH RBC QN AUTO: 27.7 PG (ref 26–34)
MCHC RBC AUTO-ENTMCNC: 33.2 G/DL (ref 31–36)
MCV RBC AUTO: 83.3 FL (ref 80–100)
METAMYELOCYTES NFR BLD MANUAL: 5 %
MONOCYTES # BLD: 0.4 K/UL (ref 0–1.3)
MONOCYTES NFR BLD: 16 %
MYELOCYTES NFR BLD MANUAL: 2 %
NEUTROPHILS # BLD: 1.5 K/UL (ref 1.7–7.7)
NEUTROPHILS NFR BLD: 48 %
PLATELET # BLD AUTO: 90 K/UL (ref 135–450)
PMV BLD AUTO: 9.5 FL (ref 5–10.5)
POTASSIUM SERPL-SCNC: 4 MMOL/L (ref 3.5–5.1)
PROMYELOCYTES NFR BLD MANUAL: 4 %
RBC # BLD AUTO: 3.02 M/UL (ref 4–5.2)
SODIUM SERPL-SCNC: 144 MMOL/L (ref 136–145)
TACROLIMUS BLOOD: 5.7 NG/ML (ref 5–20)
WBC # BLD AUTO: 2.6 K/UL (ref 4–11)

## 2024-08-20 PROCEDURE — 83735 ASSAY OF MAGNESIUM: CPT

## 2024-08-20 PROCEDURE — 94760 N-INVAS EAR/PLS OXIMETRY 1: CPT

## 2024-08-20 PROCEDURE — 97530 THERAPEUTIC ACTIVITIES: CPT

## 2024-08-20 PROCEDURE — 6370000000 HC RX 637 (ALT 250 FOR IP): Performed by: INTERNAL MEDICINE

## 2024-08-20 PROCEDURE — 99233 SBSQ HOSP IP/OBS HIGH 50: CPT | Performed by: INTERNAL MEDICINE

## 2024-08-20 PROCEDURE — 80048 BASIC METABOLIC PNL TOTAL CA: CPT

## 2024-08-20 PROCEDURE — 2580000003 HC RX 258: Performed by: INTERNAL MEDICINE

## 2024-08-20 PROCEDURE — 1200000000 HC SEMI PRIVATE

## 2024-08-20 PROCEDURE — 85025 COMPLETE CBC W/AUTO DIFF WBC: CPT

## 2024-08-20 PROCEDURE — 97535 SELF CARE MNGMENT TRAINING: CPT

## 2024-08-20 PROCEDURE — 6360000002 HC RX W HCPCS: Performed by: INTERNAL MEDICINE

## 2024-08-20 PROCEDURE — 97116 GAIT TRAINING THERAPY: CPT

## 2024-08-20 PROCEDURE — 80197 ASSAY OF TACROLIMUS: CPT

## 2024-08-20 PROCEDURE — 36415 COLL VENOUS BLD VENIPUNCTURE: CPT

## 2024-08-20 PROCEDURE — 97110 THERAPEUTIC EXERCISES: CPT

## 2024-08-20 RX ORDER — VALGANCICLOVIR 450 MG/1
450 TABLET, FILM COATED ORAL
Status: DISCONTINUED | OUTPATIENT
Start: 2024-08-20 | End: 2024-08-21 | Stop reason: HOSPADM

## 2024-08-20 RX ORDER — VALGANCICLOVIR 450 MG/1
450 TABLET, FILM COATED ORAL
Qty: 7 TABLET | Refills: 0 | Status: SHIPPED | OUTPATIENT
Start: 2024-08-20 | End: 2024-09-10

## 2024-08-20 RX ORDER — VALGANCICLOVIR 450 MG/1
225 TABLET, FILM COATED ORAL DAILY
Status: DISCONTINUED | OUTPATIENT
Start: 2024-08-20 | End: 2024-08-20

## 2024-08-20 RX ADMIN — SUCRALFATE 1 G: 1 TABLET ORAL at 05:51

## 2024-08-20 RX ADMIN — SUCRALFATE 1 G: 1 TABLET ORAL at 11:57

## 2024-08-20 RX ADMIN — SODIUM CHLORIDE, PRESERVATIVE FREE 10 ML: 5 INJECTION INTRAVENOUS at 08:27

## 2024-08-20 RX ADMIN — TACROLIMUS 0.5 MG: 0.5 CAPSULE ORAL at 08:21

## 2024-08-20 RX ADMIN — SUCRALFATE 1 G: 1 TABLET ORAL at 17:14

## 2024-08-20 RX ADMIN — ATORVASTATIN CALCIUM 20 MG: 20 TABLET, FILM COATED ORAL at 20:02

## 2024-08-20 RX ADMIN — SODIUM BICARBONATE 1300 MG: 650 TABLET ORAL at 08:20

## 2024-08-20 RX ADMIN — FAMOTIDINE 20 MG: 20 TABLET ORAL at 08:21

## 2024-08-20 RX ADMIN — SODIUM BICARBONATE 1300 MG: 650 TABLET ORAL at 20:02

## 2024-08-20 RX ADMIN — METOPROLOL TARTRATE 200 MG: 50 TABLET, FILM COATED ORAL at 08:19

## 2024-08-20 RX ADMIN — VALGANCICLOVIR 450 MG: 450 TABLET, FILM COATED ORAL at 14:43

## 2024-08-20 RX ADMIN — LEVOTHYROXINE SODIUM 112 MCG: 0.11 TABLET ORAL at 05:51

## 2024-08-20 RX ADMIN — ALLOPURINOL 100 MG: 100 TABLET ORAL at 08:22

## 2024-08-20 RX ADMIN — ASPIRIN 81 MG: 81 TABLET, COATED ORAL at 08:23

## 2024-08-20 RX ADMIN — SODIUM CHLORIDE, PRESERVATIVE FREE 10 ML: 5 INJECTION INTRAVENOUS at 20:02

## 2024-08-20 RX ADMIN — METOPROLOL TARTRATE 200 MG: 50 TABLET, FILM COATED ORAL at 20:01

## 2024-08-20 RX ADMIN — TACROLIMUS 0.5 MG: 0.5 CAPSULE ORAL at 20:01

## 2024-08-20 ASSESSMENT — ENCOUNTER SYMPTOMS
EYE REDNESS: 0
SHORTNESS OF BREATH: 0
ABDOMINAL PAIN: 0
CONSTIPATION: 0
DIARRHEA: 0
SINUS PAIN: 0
COUGH: 0
EYE DISCHARGE: 0
SINUS PRESSURE: 0
RHINORRHEA: 0
NAUSEA: 0
WHEEZING: 0
SORE THROAT: 0
BACK PAIN: 0

## 2024-08-20 NOTE — PLAN OF CARE
Problem: Safety - Adult  Goal: Free from fall injury  Outcome: Progressing     Problem: Neurosensory - Adult  Goal: Achieves stable or improved neurological status  Outcome: Progressing  Goal: Remains free of injury related to seizures activity  Outcome: Progressing  Goal: Achieves maximal functionality and self care  Outcome: Progressing     Problem: Respiratory - Adult  Goal: Achieves optimal ventilation and oxygenation  Outcome: Progressing     Problem: Cardiovascular - Adult  Goal: Maintains optimal cardiac output and hemodynamic stability  Outcome: Progressing  Goal: Absence of cardiac dysrhythmias or at baseline  Outcome: Progressing     Problem: Skin/Tissue Integrity - Adult  Goal: Skin integrity remains intact  Outcome: Progressing  Goal: Incisions, wounds, or drain sites healing without S/S of infection  Outcome: Progressing  Goal: Oral mucous membranes remain intact  Outcome: Progressing     Problem: Musculoskeletal - Adult  Goal: Return mobility to safest level of function  Outcome: Progressing  Goal: Maintain proper alignment of affected body part  Outcome: Progressing  Goal: Return ADL status to a safe level of function  Outcome: Progressing     Problem: Gastrointestinal - Adult  Goal: Minimal or absence of nausea and vomiting  Outcome: Progressing  Goal: Maintains or returns to baseline bowel function  Outcome: Progressing  Goal: Maintains adequate nutritional intake  Outcome: Progressing     Problem: Genitourinary - Adult  Goal: Absence of urinary retention  Outcome: Progressing     Problem: Infection - Adult  Goal: Absence of infection at discharge  Outcome: Progressing  Goal: Absence of infection during hospitalization  Outcome: Progressing  Goal: Absence of fever/infection during anticipated neutropenic period  Outcome: Progressing     Problem: Metabolic/Fluid and Electrolytes - Adult  Goal: Electrolytes maintained within normal limits  Outcome: Progressing  Goal: Hemodynamic stability and

## 2024-08-20 NOTE — PROGRESS NOTES
[facility] - Inpatient Rehabilitation Department   Phone: [phone]    Physical Therapy    [] Initial Evaluation            [x] Daily Treatment Note         [] Discharge Summary      Patient: Jessica Reyes   : 1950   MRN: [id number]   Date of Service:  2024  Admitting Diagnosis: IMMANUEL (acute kidney injury) (HCC)  Current Admission Summary: Per ED provider note \"Jessica Reyes is a 73 y.o. female who presents to the emergency department complaining of diarrhea for 1 week.  She does report some nausea and occasional epigastric discomfort.  She describes the diarrhea as watery.  She denies recent travel, surgery, hospitalization, fever, chills, sick exposures, recent antibiotic use, ingestion of seafood,  or spicy food prior to onset of symptoms.  Today, she feels weak and fatigued.  She thinks that she is dehydrated.  Patient does have history of chronic kidney disease and sees Dr. Duffy, nephrologist\".  Past Medical History:  has a past medical history of Clostridium difficile diarrhea, Esophageal reflux, Hyperlipidemia, Hypertension, Nephropathy, membranous, Nephrotic syndrome, and Thyroid disease.  Past Surgical History:  has a past surgical history that includes Abdomen surgery; Tonsillectomy; Knee arthroscopy; Elbow arthroscopy; Kidney transplant (); Appendectomy; and CT BIOPSY BONE MARROW (2024).    Discharge Recommendations: Jessica Reyes scored a 22/24 on the AM-PAC short mobility form. Current research shows that an AM-PAC score of 18 or greater is typically associated with a discharge to the patient's home setting. Based on the patient's AM-PAC score and their current functional mobility deficits, it is recommended that the patient have 2-3 sessions per week of Physical Therapy at d/c to increase the patient's independence.  At this time, this patient demonstrates the endurance and safety to discharge home with home health services and a follow up  patient/caregiver education, home exercise program, safety education, and equipment evaluation/education    Goals  Patient Goals: To return home   Short Term Goals:  Time Frame: Discharge  Patient will complete bed mobility at Independent   Patient will complete transfers at [facility] independent with use of RW and uni-lateral UE support  Patient will ambulate 150 ft with use of rolling walker at supervision  Patient will ascend/descend 5 stairs with (B) handrail at supervision    Above goals reviewed on 8/20/2024.  All goals are ongoing at this time unless indicated above.      Therapy Session Time      Individual Group Co-treatment   Time In 0946       Time Out 1011       Minutes 25         Timed Code Treatment Minutes:  25 Minutes  Total Treatment Minutes:  25 minutes       Electronically Signed By: Shelli Steve, PT, [id number]

## 2024-08-20 NOTE — PROGRESS NOTES
Infectious Diseases   Progress Note      Admission Date: 8/12/2024  Hospital Day: Hospital Day: 9   Attending: Mehran Stein MD  Date of service: 8/20/2024     Chief complaint/ Reason for consult:     Diarrhea for 1 week  Worsening pancytopenia  CMV viremia with CMV quantitative viral load of 3140 international unit/mL (3.4 logs) on 8/13/2024  History of renal transplantation  Acute kidney injury on chronic kidney disease with serum creatinine of 3.8  Hypomagnesemia with serum magnesium of 1.7  Hypocalcemia with serum calcium of 8.1 today    Microbiology:        I have reviewed allavailable micro lab data and cultures    Blood culture (2/2) - collected on 8/16/2024: Negative  Stool GI PCR and stool for C. difficile: Collected on 8/12/2024: Negative      Antibiotics and immunizations:       Current antibiotics: All antibiotics and their doses were reviewed by me    Recent Abx Admin                     valGANciclovir (VALCYTE) tablet 450 mg (mg) 450 mg Given 08/20/24 1443                      Immunization History: All immunization history was reviewed by me today.      There is no immunization history on file for this patient.    Known drug allergies:     All allergies were reviewed and updated    Allergies   Allergen Reactions    Cefuroxime Axetil Swelling     Lips to swell       Social history:     Social History:  All social andepidemiologic history was reviewed and updated by me today as needed.     Tobacco use:   reports that she has never smoked. She has never used smokeless tobacco.  Alcohol use:   reports no history of alcohol use.  Currently lives in: [place] [address]   reports no history of drug use.     COVID VACCINATION AND LAB RESULT RECORDS:     Internal Administration   First Dose      Second Dose           Last COVID Lab SARS-CoV-2 RNA, RT PCR (no units)   Date Value   08/12/2024 NOT DETECTED            Assessment:     The patient is a 73 y.o. old female who  has a past medical history of  Overweight (BMI 25.0-29.9) E66.3    Mixed hyperlipidemia E78.2    History of nephrotic syndrome Z87.441    History of Clostridioides difficile colitis Z86.19    Essential hypertension I10    Hypocalcemia E83.51    Hypomagnesemia E83.42    History of renal transplant Z94.0    Cytomegalovirus (CMV) viremia (HCC) B25.9    Abdominal pain, epigastric R10.13    Nausea R11.0       Please note that this chart was generated using Dragon dictation software. Although every effort was made to ensure the accuracy of this automated transcription, some errors in transcription may have occurred inadvertently. If you may need any clarification, please do not hesitate to contact me through EPIC or at the phone number provided below with my electronic signature.  Any pictures or media included in this note were obtained after taking informed verbal consent from the patient and with their approval to include those in the patient's medical record.        Markus Ramos MD, MPH, FACP, FID  8/20/2024, 7:28 PM  Central Office Phone: [phone]  Central Office Fax: [phone]    [facility] Infectious Disease   [address], [address] ([address])  [place] [address]  [place] [facility] days:  Tuesday & Thursday AM    [facility] Infectious Disease  [address] (Medical office Building)  [place], [place], [address]  [facility] days: Wednesday AM

## 2024-08-20 NOTE — PROGRESS NOTES
[facility] - Inpatient Rehabilitation Department   Phone: [phone]    Occupational Therapy    [] Initial Evaluation            [] Daily Treatment Note         [x] Discharge Summary      Patient: Jessica Reyes   : 1950   MRN: [id number]   Date of Service:  2024    Admitting Diagnosis:  IMMANUEL (acute kidney injury) (HCC)  Current Admission Summary: Per EMR: Jessica Reyes is a 73 y.o. female who presents to the emergency department complaining of diarrhea for 1 week.  She does report some nausea and occasional epigastric discomfort.  She describes the diarrhea as watery.  She denies recent travel, surgery, hospitalization, fever, chills, sick exposures, recent antibiotic use, ingestion of seafood,  or spicy food prior to onset of symptoms.  Today, she feels weak and fatigued.  She thinks that she is dehydrated.  Patient does have history of chronic kidney disease and sees Dr. Duffy, nephrologist.   Past Medical History:  has a past medical history of Clostridium difficile diarrhea, Esophageal reflux, Hyperlipidemia, Hypertension, Nephropathy, membranous, Nephrotic syndrome, and Thyroid disease.  Past Surgical History:  has a past surgical history that includes Abdomen surgery; Tonsillectomy; Knee arthroscopy; Elbow arthroscopy; Kidney transplant (); Appendectomy; and CT BIOPSY BONE MARROW (2024).    Discharge Recommendations: Jessica Reyes scored a 23/24 on the AM-PAC ADL Inpatient form. Current research shows that an AM-PAC score of 18 or greater is typically associated with a discharge to the patient's home setting. Based on the patient's AM-PAC score, and their current ADL status, at this time, no further OT is recommended at discharge d/t anticipation that pt returns to baseline by d/c. Recommend patient returns to prior setting with prior services.        If patient discharges prior to next session this note will serve as a discharge summary.  Please see below  training, ADL/self-care training, IADL training, home management training, and safety education    Goals  Patient Goals: Return to home   Short Term Goals:  Time Frame: Discharge  Patient will complete upper body ADL at Independent --Pt refusing at this time, anticipate IND  Patient will complete lower body ADL at Independent -- Pt refusing at this time, anticipate IND  Patient will complete toileting at Independent --GOAL MET 8/15  Patient will complete grooming at Independent --GOAL MET 8/20  Patient will complete functional transfers at Independent --GOAL MET 8/20    Above goals reviewed on 8/20/2024.  All goals are ongoing at this time unless indicated above.       Therapy Session Time     Individual Group Co-treatment   Time In 1229     Time Out 1252     Minutes 23         Timed Code Treatment Minutes:23  Total Treatment Minutes:  23       Electronically Signed By:WILFRIDO Barnes CNS ([id number]) 8/20/2024 1:13 PM

## 2024-08-20 NOTE — PROGRESS NOTES
Assessment completed. VSS. Patient resting quietly in bed. Fall precautions in place. Will continue to monitor

## 2024-08-20 NOTE — PLAN OF CARE
Problem: Safety - Adult  Goal: Free from fall injury  8/20/2024 1518 by Nadira Lopez RN  Outcome: Progressing  8/20/2024 0419 by Kari Cunningham RN  Outcome: Progressing     Problem: Neurosensory - Adult  Goal: Achieves stable or improved neurological status  8/20/2024 1518 by Nadira Lopez RN  Outcome: Progressing  8/20/2024 0419 by Kari Cunningham RN  Outcome: Progressing  Goal: Remains free of injury related to seizures activity  8/20/2024 1518 by Nadira Lopez, RN  Outcome: Progressing  8/20/2024 0419 by Kari Cunningham RN  Outcome: Progressing  Goal: Achieves maximal functionality and self care  8/20/2024 1518 by Nadira Lopez RN  Outcome: Progressing  8/20/2024 0419 by Kari Cunningham RN  Outcome: Progressing     Problem: Respiratory - Adult  Goal: Achieves optimal ventilation and oxygenation  8/20/2024 1518 by Nadira Lopez RN  Outcome: Progressing  8/20/2024 0419 by Kari Cunningham RN  Outcome: Progressing     Problem: Cardiovascular - Adult  Goal: Maintains optimal cardiac output and hemodynamic stability  8/20/2024 1518 by Nadira Lopez RN  Outcome: Progressing  8/20/2024 0419 by Kari Cunningham RN  Outcome: Progressing  Goal: Absence of cardiac dysrhythmias or at baseline  8/20/2024 1518 by Nadira Lopez RN  Outcome: Progressing  8/20/2024 0419 by Kari Cunningham RN  Outcome: Progressing     Problem: Skin/Tissue Integrity - Adult  Goal: Skin integrity remains intact  8/20/2024 1518 by Nadira Lopez RN  Outcome: Progressing  8/20/2024 0419 by Kari Cunningham RN  Outcome: Progressing  Goal: Incisions, wounds, or drain sites healing without S/S of infection  8/20/2024 1518 by Nadira Lopez RN  Outcome: Progressing  8/20/2024 0419 by Kari Cunningham RN  Outcome: Progressing  Goal: Oral mucous membranes remain intact  8/20/2024 1518 by Nadira Lopez RN  Outcome: Progressing  8/20/2024 0419 by Stookey, Kari, RN  Outcome: Progressing     Problem: Musculoskeletal -  limits  8/20/2024 1518 by Nadira Lopez RN  Outcome: Progressing  8/20/2024 0419 by Kari Cunningham RN  Outcome: Progressing  Goal: Hemodynamic stability and optimal renal function maintained  8/20/2024 1518 by Nadira Lopez, RN  Outcome: Progressing  8/20/2024 0419 by Kari Cunningham RN  Outcome: Progressing  Goal: Glucose maintained within prescribed range  8/20/2024 1518 by Nadira Lopez, RN  Outcome: Progressing  8/20/2024 0419 by Kari Cunningham RN  Outcome: Progressing     Problem: Hematologic - Adult  Goal: Maintains hematologic stability  8/20/2024 1518 by Nadira Lopez, RN  Outcome: Progressing  8/20/2024 0419 by Kari Cunningham RN  Outcome: Progressing     Problem: Pain  Goal: Verbalizes/displays adequate comfort level or baseline comfort level  8/20/2024 1518 by Nadira Lopez, RN  Outcome: Progressing  8/20/2024 0419 by Kari Cunningham, RN  Outcome: Progressing     Problem: Nutrition Deficit:  Goal: Optimize nutritional status  8/20/2024 1518 by Nadira Lopez RN  Outcome: Progressing  8/20/2024 0419 by Kari Cunningham RN  Outcome: Progressing

## 2024-08-20 NOTE — PROGRESS NOTES
[facility] Note    Patient Active Problem List   Diagnosis    IMMANUEL (acute kidney injury) (HCC)    Pancytopenia (HCC)    High cholesterol    Hypothyroid    Diarrhea    Overweight (BMI 25.0-29.9)    Mixed hyperlipidemia    History of nephrotic syndrome    History of Clostridioides difficile colitis    Essential hypertension    Hypocalcemia    Hypomagnesemia    History of renal transplant    Cytomegalovirus (CMV) viremia (HCC)    Abdominal pain, epigastric    Nausea       Past Medical History:   has a past medical history of Clostridium difficile diarrhea, Esophageal reflux, Hyperlipidemia, Hypertension, Nephropathy, membranous, Nephrotic syndrome, and Thyroid disease.    Past Social History:   reports that she has never smoked. She has never used smokeless tobacco. She reports that she does not drink alcohol and does not use drugs.    Subjective:    Diarrhea better  Urinating well    Objective:    BP (!) 149/88   Pulse 68   Temp 97.8 °F (36.6 °C) (Oral)   Resp 15   Ht 1.626 m (5' 4.02\")   Wt 83.7 kg (184 lb 8.4 oz)   SpO2 93%   BMI 31.66 kg/m²     Wt Readings from Last 3 Encounters:   08/20/24 83.7 kg (184 lb 8.4 oz)   07/30/24 78.5 kg (173 lb)   06/11/24 83 kg (183 lb)       BP Readings from Last 3 Encounters:   08/20/24 (!) 149/88   07/30/24 (!) 145/94   06/11/24 134/87     Chest- clear  Heart-regular  Abd-soft  Ext-1+ edema    Labs  Hemoglobin   Date Value Ref Range Status   08/20/2024 8.4 (L) 12.0 - 16.0 g/dL Final     Hematocrit   Date Value Ref Range Status   08/20/2024 25.1 (L) 36.0 - 48.0 % Final     WBC   Date Value Ref Range Status   08/20/2024 2.6 (L) 4.0 - 11.0 K/uL Final     Platelets   Date Value Ref Range Status   08/20/2024 90 (L) 135 - 450 K/uL Final     Lab Results   Component Value Date    CREATININE 2.2 (H) 08/20/2024    BUN 42 (H) 08/20/2024     08/20/2024    K 4.0 08/20/2024     (H) 08/20/2024    CO2 19 (L) 08/20/2024      Tacro level 11.7.  Repeat tacrolimus level still at 10.5.  Tacro level today pending.  VBG 7.41/24  UA-large LE with 112 wbc.  Small blood, >1000protein  CK 50    8/12/2024 urine culture negative    Renal ultrasound    Impression:        1. Transplanted right pelvic kidney demonstrating mild hydronephrosis.  2. Bilateral native renal atrophy.  3. Under distended bladder.     CMP positive    Assessment/Plan:   IMMANUEL on CKD 3b-baseline crea low 2's.  S/P LRRT in 2012.  IMMANUEL probably from volume depletion plus anemia.  Probable ATN.  Nonoliguric.   Hold MMF for now with diarrhea.  Was not been able to take Tacro for 3 days due to vomiting.   Tacro level now elevated.  Decrease tacrolimus to 0.5 mg twice daily.  Renal ultrasound showed mild hydronephrosis, will ask Urology to see.  This could just be from post transplant.  Appreciate Urology input, no obstruction.  Crea now better.  Follow I&O.  Follow Tacro level.  Creatinine now back to baseline.  Rejection less likely.  Low serum HCO3-serum HCO3 better.  VBG showing resp. Alkalosis.  Stopped LR infusion.  Was also having diarrhea.  Since persistent, started po HCO3.  Better.  Anemia- follow Hgb.  Better status post packed red blood cell transfusion.  Heme following.   Diarrhea-per Medicine.  Hold MMF for now.  H/O HTN-controlled.  Episodes of low BP.  Now better.  HLD-on statin.    Proteinuria-quantify once renal function stable.  ESRD thought to be from HTN.   Pyuria-s/p Cipro.  Urine culture negative.  Anemia with leukopenia and thrombocytopenia-bone marrow biopsy done.  Hematology following.  CMV PCR detected.  Currently on Ganciclovir.   Dspo-stable for discharge from renal perspective once okay with other teams.  Follow-up as an outpatient in 2 weeks.    Sudha Roque MD

## 2024-08-20 NOTE — PROGRESS NOTES
Progress Note - Dr. Stein - Internal Medicine  PCP: Mehran Stein MD [address] [facility] [address] [phone]    Hospital Day: 8  Code Status: Full Code  Current Diet: ADULT DIET; Regular        CC: follow up on medical issues    Subjective:   Jessica Reyes is a 73 y.o. female.    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     HENT: Negative for dental problem, nosebleeds and rhinorrhea.      Eyes: Negative for photophobia and visual disturbance.     Respiratory: Negative for cough, chest tightness and shortness of breath.      Cardiovascular: Negative for chest pain and leg swelling.     Gastrointestinal: positive for diarrhea, negative for nausea and vomiting.     Endocrine: Negative for polydipsia and polyphagia.     Genitourinary: Negative for frequency, hematuria and urgency.     Musculoskeletal: Negative for back pain and myalgias.     Skin: Negative for rash.     Allergic/Immunologic: Negative for food allergies.     Neurological: Negative for dizziness, seizures, syncope and facial asymmetry.     Hematological: Negative for adenopathy.     Psychiatric/Behavioral: Negative for dysphoric mood. The patient is not nervous/anxious.        I have reviewed the patient's medical and social history in detail and updated the computerized patient record.  To recap: She  has a past medical history of Clostridium difficile diarrhea, Esophageal reflux, Hyperlipidemia, Hypertension, Nephropathy, membranous, Nephrotic syndrome, and Thyroid disease.. She  has a past surgical history that includes Abdomen surgery;

## 2024-08-21 VITALS
TEMPERATURE: 97.8 F | DIASTOLIC BLOOD PRESSURE: 87 MMHG | BODY MASS INDEX: 31.2 KG/M2 | HEART RATE: 77 BPM | RESPIRATION RATE: 20 BRPM | SYSTOLIC BLOOD PRESSURE: 127 MMHG | HEIGHT: 64 IN | WEIGHT: 182.76 LBS | OXYGEN SATURATION: 96 %

## 2024-08-21 LAB
ANION GAP SERPL CALCULATED.3IONS-SCNC: 10 MMOL/L (ref 3–16)
BASOPHILS # BLD: 0 K/UL (ref 0–0.2)
BASOPHILS NFR BLD: 0 %
BUN SERPL-MCNC: 42 MG/DL (ref 7–20)
CALCIUM SERPL-MCNC: 8.2 MG/DL (ref 8.3–10.6)
CHLORIDE SERPL-SCNC: 114 MMOL/L (ref 99–110)
CO2 SERPL-SCNC: 19 MMOL/L (ref 21–32)
CREAT SERPL-MCNC: 2.3 MG/DL (ref 0.6–1.2)
DEPRECATED RDW RBC AUTO: 15.7 % (ref 12.4–15.4)
EOSINOPHIL # BLD: 0 K/UL (ref 0–0.6)
EOSINOPHIL NFR BLD: 1 %
GFR SERPLBLD CREATININE-BSD FMLA CKD-EPI: 22 ML/MIN/{1.73_M2}
GLUCOSE SERPL-MCNC: 133 MG/DL (ref 70–99)
HCT VFR BLD AUTO: 26.7 % (ref 36–48)
HGB BLD-MCNC: 8.6 G/DL (ref 12–16)
LYMPHOCYTES # BLD: 1 K/UL (ref 1–5.1)
LYMPHOCYTES NFR BLD: 54 %
MCH RBC QN AUTO: 26.7 PG (ref 26–34)
MCHC RBC AUTO-ENTMCNC: 32.1 G/DL (ref 31–36)
MCV RBC AUTO: 83.3 FL (ref 80–100)
MONOCYTES # BLD: 0.4 K/UL (ref 0–1.3)
MONOCYTES NFR BLD: 22 %
NEUTROPHILS # BLD: 0.4 K/UL (ref 1.7–7.7)
NEUTROPHILS NFR BLD: 23 %
OVALOCYTES BLD QL SMEAR: ABNORMAL
PATH INTERP BLD-IMP: NO
PLATELET # BLD AUTO: 86 K/UL (ref 135–450)
PLATELET BLD QL SMEAR: ABNORMAL
PMV BLD AUTO: 9.4 FL (ref 5–10.5)
POLYCHROMASIA BLD QL SMEAR: ABNORMAL
POTASSIUM SERPL-SCNC: 3.8 MMOL/L (ref 3.5–5.1)
RBC # BLD AUTO: 3.21 M/UL (ref 4–5.2)
SLIDE REVIEW: ABNORMAL
SODIUM SERPL-SCNC: 143 MMOL/L (ref 136–145)
WBC # BLD AUTO: 1.8 K/UL (ref 4–11)

## 2024-08-21 PROCEDURE — 85025 COMPLETE CBC W/AUTO DIFF WBC: CPT

## 2024-08-21 PROCEDURE — 80048 BASIC METABOLIC PNL TOTAL CA: CPT

## 2024-08-21 PROCEDURE — 36415 COLL VENOUS BLD VENIPUNCTURE: CPT

## 2024-08-21 PROCEDURE — 2580000003 HC RX 258: Performed by: INTERNAL MEDICINE

## 2024-08-21 PROCEDURE — 6370000000 HC RX 637 (ALT 250 FOR IP): Performed by: INTERNAL MEDICINE

## 2024-08-21 PROCEDURE — 6360000002 HC RX W HCPCS: Performed by: INTERNAL MEDICINE

## 2024-08-21 RX ORDER — VALSARTAN 320 MG/1
320 TABLET ORAL DAILY
Qty: 90 TABLET | Refills: 3
Start: 2024-08-21

## 2024-08-21 RX ADMIN — METOPROLOL TARTRATE 200 MG: 50 TABLET, FILM COATED ORAL at 09:20

## 2024-08-21 RX ADMIN — TACROLIMUS 0.5 MG: 0.5 CAPSULE ORAL at 09:20

## 2024-08-21 RX ADMIN — FAMOTIDINE 20 MG: 20 TABLET ORAL at 09:20

## 2024-08-21 RX ADMIN — SODIUM CHLORIDE, PRESERVATIVE FREE 10 ML: 5 INJECTION INTRAVENOUS at 09:20

## 2024-08-21 RX ADMIN — SODIUM BICARBONATE 1300 MG: 650 TABLET ORAL at 09:20

## 2024-08-21 RX ADMIN — LEVOTHYROXINE SODIUM 112 MCG: 0.11 TABLET ORAL at 05:36

## 2024-08-21 RX ADMIN — ALLOPURINOL 100 MG: 100 TABLET ORAL at 09:20

## 2024-08-21 RX ADMIN — SUCRALFATE 1 G: 1 TABLET ORAL at 05:36

## 2024-08-21 RX ADMIN — ASPIRIN 81 MG: 81 TABLET, COATED ORAL at 09:20

## 2024-08-21 NOTE — PROGRESS NOTES
Medication picked up from pharmacy, pt discharged home with pt sister, escorted out in wc with this nurse. All belongings sent with pt

## 2024-08-21 NOTE — CARE COORDINATION
08/21/24 1041   IMM Letter   IMM Letter given to Patient/Family/Significant other/Guardian/POA/by: Glory Morales RN CM   IMM Letter date given: 08/21/24   IMM Letter time given: 1025  (copy made for hard chart)

## 2024-08-21 NOTE — PROGRESS NOTES
CLINICAL PHARMACY NOTE: MEDS TO BEDS    Total # of Prescriptions Filled: 1   The following medications were delivered to the patient:  VALGANCICLOVIR HCL 450MG TABS    Additional Documentation: Letty JARVIS approved to deliver medications to patient room=signed  Cristy \[facility] [place] [facility]\"" Pharmacy tech

## 2024-08-21 NOTE — PLAN OF CARE
Problem: Safety - Adult  Goal: Free from fall injury  Outcome: Adequate for Discharge     Problem: Neurosensory - Adult  Goal: Achieves stable or improved neurological status  Outcome: Adequate for Discharge     Problem: Neurosensory - Adult  Goal: Remains free of injury related to seizures activity  Outcome: Adequate for Discharge     Problem: Neurosensory - Adult  Goal: Achieves maximal functionality and self care  Outcome: Adequate for Discharge     Problem: Respiratory - Adult  Goal: Achieves optimal ventilation and oxygenation  Outcome: Adequate for Discharge  Flowsheets (Taken 8/21/2024 0920)  Achieves optimal ventilation and oxygenation: Assess for changes in respiratory status     Problem: Cardiovascular - Adult  Goal: Maintains optimal cardiac output and hemodynamic stability  Outcome: Adequate for Discharge  Flowsheets (Taken 8/21/2024 0920)  Maintains optimal cardiac output and hemodynamic stability:   Monitor urine output and notify Licensed Independent Practitioner for values outside of normal range   Monitor blood pressure and heart rate     Problem: Cardiovascular - Adult  Goal: Absence of cardiac dysrhythmias or at baseline  Outcome: Adequate for Discharge  Flowsheets (Taken 8/21/2024 0920)  Absence of cardiac dysrhythmias or at baseline: Monitor cardiac rate and rhythm     Problem: Skin/Tissue Integrity - Adult  Goal: Skin integrity remains intact  Outcome: Adequate for Discharge  Flowsheets (Taken 8/21/2024 0920)  Skin Integrity Remains Intact: Monitor for areas of redness and/or skin breakdown     Problem: Skin/Tissue Integrity - Adult  Goal: Incisions, wounds, or drain sites healing without S/S of infection  Outcome: Adequate for Discharge     Problem: Skin/Tissue Integrity - Adult  Goal: Oral mucous membranes remain intact  Outcome: Adequate for Discharge     Problem: Musculoskeletal - Adult  Goal: Return mobility to safest level of function  Outcome: Adequate for Discharge  Flowsheets (Taken

## 2024-08-21 NOTE — DISCHARGE SUMMARY
[facility] -- Physician Discharge Summary     Jessica Reyes  1950  MRN: [id number]    Admit Date: 8/12/2024  Discharge Date: No discharge date for patient encounter.    Attending MD: Mehran Stein MD  Discharging MD: Mehran Stein MD  PCP: Mehran Stein MD [address] [facility] [address] [phone]    Admission Diagnosis: Abdominal pain, epigastric [R10.13]  Nausea [R11.0]  IMMANUEL (acute kidney injury) (HCC) [N17.9]  Diarrhea, unspecified type [R19.7]  Acute renal failure superimposed on chronic kidney disease, unspecified acute renal failure type, unspecified CKD stage (HCC) [N17.9, N18.9]  DISCHARGE DIAGNOSIS: same    Full Hospital Problem List:  Active Hospital Problems    Diagnosis Date Noted    Overweight (BMI 25.0-29.9) [E66.3] 08/16/2024    Mixed hyperlipidemia [E78.2] 08/16/2024    History of nephrotic syndrome [Z87.441] 08/16/2024    History of Clostridioides difficile colitis [Z86.19] 08/16/2024    Essential hypertension [I10] 08/16/2024    Hypocalcemia [E83.51] 08/16/2024    Hypomagnesemia [E83.42] 08/16/2024    History of renal transplant [Z94.0] 08/16/2024    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 female who presents for hospital 1 week history of diarrhea has been having liquid stools 4 times a day with some epigastric discomfort . Extremely dehydrated.    FOund to have CMV viremia. Treated with iv gancyclovir.  Seen by ID. To continue oral gancicylovir q3d x 30d.    While here, found to be pancytopenic  No clear evidence of B12, folate or iron deficiency. SPEP negative for monoclonal gammopathy. Bone marrow  disease.. She  has a past surgical history that includes Abdomen surgery; Tonsillectomy; Knee arthroscopy; Elbow arthroscopy; Kidney transplant (2013); Appendectomy; and CT BIOPSY BONE MARROW (8/14/2024).. She  reports that she has never smoked. She has never used smokeless tobacco. She reports that she does not drink alcohol and does not use drugs..      Current Facility-Administered Medications: valGANciclovir (VALCYTE) tablet 450 mg, 450 mg, Oral, Q72H  sodium bicarbonate tablet 1,300 mg, 1,300 mg, Oral, BID  tacrolimus (proGRAF) capsule 0.5 mg, 0.5 mg, Oral, BID  hydrALAZINE (APRESOLINE) injection 10 mg, 10 mg, IntraVENous, Q6H PRN  sodium chloride 0.9 % bolus 500 mL, 500 mL, IntraVENous, PRN  ondansetron (ZOFRAN) injection 4 mg, 4 mg, IntraVENous, Q6H PRN  0.9 % sodium chloride infusion, , IntraVENous, PRN  0.9 % sodium chloride infusion, , IntraVENous, PRN  aspirin EC tablet 81 mg, 81 mg, Oral, Daily  allopurinol (ZYLOPRIM) tablet 100 mg, 100 mg, Oral, Daily  famotidine (PEPCID) tablet 20 mg, 20 mg, Oral, Daily  levothyroxine (SYNTHROID) tablet 112 mcg, 112 mcg, Oral, Daily  metoprolol tartrate (LOPRESSOR) tablet 200 mg, 200 mg, Oral, BID  atorvastatin (LIPITOR) tablet 20 mg, 20 mg, Oral, Nightly  sucralfate (CARAFATE) tablet 1 g, 1 g, Oral, 4 times per day  sodium chloride flush 0.9 % injection 5-40 mL, 5-40 mL, IntraVENous, 2 times per day  sodium chloride flush 0.9 % injection 5-40 mL, 5-40 mL, IntraVENous, PRN  0.9 % sodium chloride infusion, , IntraVENous, PRN  polyethylene glycol (GLYCOLAX) packet 17 g, 17 g, Oral, Daily PRN  acetaminophen (TYLENOL) tablet 650 mg, 650 mg, Oral, Q6H PRN **OR** acetaminophen (TYLENOL) suppository 650 mg, 650 mg, Rectal, Q6H PRN  promethazine (PHENERGAN) 12.5 mg in sodium chloride 0.9 % 50 mL IVPB, 12.5 mg, IntraVENous, Q6H PRN         Objective (exam): see above    Labs at time of d/c:  Lab Results   Component Value Date    WBC 1.8 (LL) 08/21/2024    HGB 8.6 (L) 08/21/2024

## 2024-08-21 NOTE — PROGRESS NOTES
Pt discharging home. IV removed. Discharge instructions reviewed with pt. Pt staes understanding of instructions. Medication ready in outpt pharmacy and will be picked up upon discharge. Pt sitting up in room, belongings packed, waiting for her sister to pick her up

## 2024-08-21 NOTE — PROGRESS NOTES
[facility] Note    Patient Active Problem List   Diagnosis    IMMANUEL (acute kidney injury) (HCC)    Pancytopenia (HCC)    High cholesterol    Hypothyroid    Diarrhea    Overweight (BMI 25.0-29.9)    Mixed hyperlipidemia    History of nephrotic syndrome    History of Clostridioides difficile colitis    Essential hypertension    Hypocalcemia    Hypomagnesemia    History of renal transplant    Cytomegalovirus (CMV) viremia (HCC)    Abdominal pain, epigastric    Nausea       Past Medical History:   has a past medical history of Clostridium difficile diarrhea, Esophageal reflux, Hyperlipidemia, Hypertension, Nephropathy, membranous, Nephrotic syndrome, and Thyroid disease.    Past Social History:   reports that she has never smoked. She has never used smokeless tobacco. She reports that she does not drink alcohol and does not use drugs.    Subjective:    Diarrhea better  Urinating well    Objective:    /87   Pulse 77   Temp 97.8 °F (36.6 °C) (Oral)   Resp 20   Ht 1.626 m (5' 4.02\")   Wt 82.9 kg (182 lb 12.2 oz)   SpO2 96%   BMI 31.36 kg/m²     Wt Readings from Last 3 Encounters:   08/21/24 82.9 kg (182 lb 12.2 oz)   07/30/24 78.5 kg (173 lb)   06/11/24 83 kg (183 lb)       BP Readings from Last 3 Encounters:   08/21/24 127/87   07/30/24 (!) 145/94   06/11/24 134/87     Chest- clear  Heart-regular  Abd-soft  Ext-1+ edema    Labs  Hemoglobin   Date Value Ref Range Status   08/21/2024 8.7 (L) 12.0 - 16.0 g/dL Final     Hematocrit   Date Value Ref Range Status   08/21/2024 27.1 (L) 36.0 - 48.0 % Final     WBC   Date Value Ref Range Status   08/21/2024 2.0 (L) 4.0 - 11.0 K/uL Final     Platelets   Date Value Ref Range Status   08/21/2024 82 (L) 135 - 450 K/uL Final     Lab Results   Component Value Date    CREATININE 2.3 (H) 08/21/2024    BUN 42 (H) 08/21/2024     08/21/2024    K 3.8 08/21/2024     (H) 08/21/2024    CO2 19 (L) 08/21/2024

## 2024-08-21 NOTE — CARE COORDINATION
Case Management -  Discharge Note      Patient Name: Jessica Reyes                   YOB: 1950            Readmission Risk (Low < 19, Mod (19-27), High > 27): Readmission Risk Score: 14.4    Current PCP: Mehran Stein MD      (IMM) Important Message from Medicare:    Has pt received appropriate IMM before discharge if required: yes  Date: 8/21/2022    PT AM-PAC: 22 /24  OT AM-PAC: 23 /24    Patient/patient representative has been educated on the benefits of home care as well as the possible risks of declining recommended services. Patient/patient representative has acknowledged the information provided and decided on the following discharge plan. Patient/ patient representative has been provided freedom of choice regarding service provider, supported by basic dialogue that supports the patient's individualized plan of care/goals.    Pt still declining home care services. CM offered medical house [facility] visit and she declined services.    Pt has been informed of discharge service called [facility] Transitional Care Program that would follow up with pt after discharge in their home.  Pt has declined to have this service at this time because does not want services.         Financial    Payor: AET MEDICARE / Plan: AET MEDICARE-ADVANTAGE PPO / Product Type: Medicare /     Pharmacy:  Potential assistance Purchasing Medications: No  Meds-to-Beds request: Yes      [facility] - [place] - [facility] - P [phone] -  [phone]  [facility]  [address] [place] [address]  Phone: [phone] Fax: [phone]    [facility] [id number] - [place], [place] - [address] - P [phone] - F [phone]  [address]  [facility] [address]  Phone: [phone] Fax: [phone]      Notes:    Additional Case Management Notes: Patient discharged 8/21/2024 to home.  All discharge needs met per case management.    Glory Morales RN, BSN  [phone]

## 2024-08-22 LAB
BASOPHILS # BLD: 0 K/UL (ref 0–0.2)
BASOPHILS NFR BLD: 0 %
DEPRECATED RDW RBC AUTO: 16 % (ref 12.4–15.4)
EOSINOPHIL # BLD: 0.1 K/UL (ref 0–0.6)
EOSINOPHIL NFR BLD: 7 %
HCT VFR BLD AUTO: 27.1 % (ref 36–48)
HGB BLD-MCNC: 8.7 G/DL (ref 12–16)
LYMPHOCYTES # BLD: 0.9 K/UL (ref 1–5.1)
LYMPHOCYTES NFR BLD: 44 %
MCH RBC QN AUTO: 26.6 PG (ref 26–34)
MCHC RBC AUTO-ENTMCNC: 32.2 G/DL (ref 31–36)
MCV RBC AUTO: 82.6 FL (ref 80–100)
METAMYELOCYTES NFR BLD MANUAL: 2 %
MONOCYTES # BLD: 0.4 K/UL (ref 0–1.3)
MONOCYTES NFR BLD: 18 %
NEUTROPHILS # BLD: 0.6 K/UL (ref 1.7–7.7)
NEUTROPHILS NFR BLD: 29 %
OVALOCYTES BLD QL SMEAR: ABNORMAL
PATH INTERP BLD-IMP: NO
PLATELET # BLD AUTO: 82 K/UL (ref 135–450)
PLATELET BLD QL SMEAR: ABNORMAL
PMV BLD AUTO: 8.8 FL (ref 5–10.5)
POLYCHROMASIA BLD QL SMEAR: ABNORMAL
RBC # BLD AUTO: 3.28 M/UL (ref 4–5.2)
SLIDE REVIEW: ABNORMAL
WBC # BLD AUTO: 2 K/UL (ref 4–11)

## 2024-08-26 ENCOUNTER — HOSPITAL ENCOUNTER (OUTPATIENT)
Age: 74
Discharge: HOME | End: 2024-08-26
Payer: MEDICARE

## 2024-08-26 DIAGNOSIS — N18.30 BENIGN HYPERTENSION WITH CHRONIC KIDNEY DISEASE, STAGE III (HCC): ICD-10-CM

## 2024-08-26 DIAGNOSIS — Z92.25 PERSONAL HISTORY OF IMMUNOSUPPRESSIVE THERAPY: ICD-10-CM

## 2024-08-26 DIAGNOSIS — R80.9 NEPHROTIC RANGE PROTEINURIA: ICD-10-CM

## 2024-08-26 DIAGNOSIS — Z94.0 KIDNEY TRANSPLANT RECIPIENT: ICD-10-CM

## 2024-08-26 DIAGNOSIS — I10 WHITE COAT SYNDROME WITH DIAGNOSIS OF HYPERTENSION: ICD-10-CM

## 2024-08-26 DIAGNOSIS — D63.1 ANEMIA IN CHRONIC KIDNEY DISEASE, UNSPECIFIED CKD STAGE: ICD-10-CM

## 2024-08-26 DIAGNOSIS — I12.9 BENIGN HYPERTENSION WITH CHRONIC KIDNEY DISEASE, STAGE III (HCC): ICD-10-CM

## 2024-08-26 DIAGNOSIS — N18.9 ANEMIA IN CHRONIC KIDNEY DISEASE, UNSPECIFIED CKD STAGE: ICD-10-CM

## 2024-08-26 DIAGNOSIS — N17.9 ACUTE KIDNEY INJURY SUPERIMPOSED ON CHRONIC KIDNEY DISEASE (HCC): ICD-10-CM

## 2024-08-26 DIAGNOSIS — N18.9 ACUTE KIDNEY INJURY SUPERIMPOSED ON CHRONIC KIDNEY DISEASE (HCC): ICD-10-CM

## 2024-08-26 LAB
ALBUMIN SERPL-MCNC: 4 G/DL (ref 3.4–5)
ANION GAP SERPL CALCULATED.3IONS-SCNC: 12 MMOL/L (ref 3–16)
BUN SERPL-MCNC: 31 MG/DL (ref 7–20)
CALCIUM SERPL-MCNC: 8.7 MG/DL (ref 8.3–10.6)
CHLORIDE SERPL-SCNC: 111 MMOL/L (ref 99–110)
CO2 SERPL-SCNC: 19 MMOL/L (ref 21–32)
CREAT SERPL-MCNC: 2.1 MG/DL (ref 0.6–1.2)
CREAT UR-MCNC: 56.7 MG/DL (ref 28–259)
DEPRECATED RDW RBC AUTO: 16.2 % (ref 12.4–15.4)
GFR SERPLBLD CREATININE-BSD FMLA CKD-EPI: 24 ML/MIN/{1.73_M2}
GLUCOSE SERPL-MCNC: 124 MG/DL (ref 70–99)
HCT VFR BLD AUTO: 27.7 % (ref 36–48)
HGB BLD-MCNC: 9.1 G/DL (ref 12–16)
Lab: NORMAL
MAGNESIUM SERPL-MCNC: 1.71 MG/DL (ref 1.8–2.4)
MCH RBC QN AUTO: 27.9 PG (ref 26–34)
MCHC RBC AUTO-ENTMCNC: 33.1 G/DL (ref 31–36)
MCV RBC AUTO: 84.3 FL (ref 80–100)
PHOSPHATE SERPL-MCNC: 3.5 MG/DL (ref 2.5–4.9)
PLATELET # BLD AUTO: 186 K/UL (ref 135–450)
PMV BLD AUTO: 9.2 FL (ref 5–10.5)
POTASSIUM SERPL-SCNC: 4.2 MMOL/L (ref 3.5–5.1)
PROT UR-MCNC: 125 MG/DL
PROT/CREAT UR-RTO: 2.2 MG/DL
RBC # BLD AUTO: 3.28 M/UL (ref 4–5.2)
REPORT: NORMAL
SODIUM SERPL-SCNC: 142 MMOL/L (ref 136–145)
THIS TEST SENT TO: NORMAL
WBC # BLD AUTO: 2.8 K/UL (ref 4–11)

## 2024-08-26 PROCEDURE — 84156 ASSAY OF PROTEIN URINE: CPT

## 2024-08-26 PROCEDURE — 80069 RENAL FUNCTION PANEL: CPT

## 2024-08-26 PROCEDURE — 83735 ASSAY OF MAGNESIUM: CPT

## 2024-08-26 PROCEDURE — 82570 ASSAY OF URINE CREATININE: CPT

## 2024-08-26 PROCEDURE — 36415 COLL VENOUS BLD VENIPUNCTURE: CPT

## 2024-08-26 PROCEDURE — 85027 COMPLETE CBC AUTOMATED: CPT

## 2024-09-05 NOTE — PROGRESS NOTES
Physician Progress Note      PATIENT:               SOFÍA PERAZA  [facility] #:                  [id number]  :                       1950  ADMIT DATE:       2024 11:55 AM  DISCH DATE:        2024 11:04 AM  RESPONDING  PROVIDER #:        Mehran Stein MD          QUERY TEXT:    Patient admitted with abdominal pain, diarrhea and IMMANUEL.  Noted documentation   of IMMANUEL on CKD 3B throughout the chart by Nephrology and IMMANUEL on ESRD by   attending.  Also throughout the chart documentation of ESRD thought to be due   to hypertension.  Pt is s/p transplant and creatinine range during this   admission 2.1 - 3.8.  GFR range during stay was 12 - 24.   In order to support   the diagnosis of ESRD, please include additional clinical indicators in your   documentation.  Or please document if the diagnosis of ESRD has been ruled out   after further study.    The medical record reflects the following:  Risk Factors: 73 y.o. female with history of kidney transplant, C-diff,   Esophageal reflux, HTN, HLD, Nephropathy, Nephrotic syndrome, thyroid disease  Clinical Indicators: Pt is s/p kidney transplant and creatinine range during   this admission 2.1 - 3.8.  GFR range during stay was 12 - 24.  Treatment: Nephrology consult, Daily lab monitoring  Options provided:  -- ESRD was ruled out as a current diagnoses for this admission  -- ESRD is a current diagnoses present this admission as evidenced by, Please   document evidence.  -- Other - I will add my own diagnosis  -- Disagree - Not applicable / Not valid  -- Disagree - Clinically unable to determine / Unknown  -- Refer to Clinical Documentation Reviewer    PROVIDER RESPONSE TEXT:    ESRD was ruled out as a current diagnoses for this admission after study.    Query created by: Iván Skaggs on 2024 7:48 AM      Electronically signed by:  Mehran Stein MD 2024 9:43 AM

## 2024-09-11 ENCOUNTER — HOSPITAL ENCOUNTER (OUTPATIENT)
Age: 74
Discharge: HOME | End: 2024-09-11
Payer: MEDICARE

## 2024-09-11 DIAGNOSIS — Z94.0 KIDNEY TRANSPLANT RECIPIENT: ICD-10-CM

## 2024-09-11 DIAGNOSIS — Z09 HOSPITAL DISCHARGE FOLLOW-UP: ICD-10-CM

## 2024-09-11 DIAGNOSIS — Z79.621 ENCOUNTER FOR MONITORING TACROLIMUS THERAPY: ICD-10-CM

## 2024-09-11 DIAGNOSIS — N18.4 CKD STAGE 4 SECONDARY TO HYPERTENSION (HCC): ICD-10-CM

## 2024-09-11 DIAGNOSIS — E83.42 HYPOMAGNESEMIA: ICD-10-CM

## 2024-09-11 DIAGNOSIS — I12.9 CKD STAGE 4 SECONDARY TO HYPERTENSION (HCC): ICD-10-CM

## 2024-09-11 DIAGNOSIS — Z51.81 ENCOUNTER FOR MONITORING TACROLIMUS THERAPY: ICD-10-CM

## 2024-09-11 DIAGNOSIS — Z92.25 PERSONAL HISTORY OF IMMUNOSUPPRESSIVE THERAPY: ICD-10-CM

## 2024-09-11 LAB
25(OH)D3 SERPL-MCNC: 24.2 NG/ML
ALBUMIN SERPL-MCNC: 3.9 G/DL (ref 3.4–5)
ANION GAP SERPL CALCULATED.3IONS-SCNC: 11 MMOL/L (ref 3–16)
BACTERIA URNS QL MICRO: ABNORMAL /HPF
BILIRUB UR QL STRIP.AUTO: NEGATIVE
BUN SERPL-MCNC: 55 MG/DL (ref 7–20)
CALCIUM SERPL-MCNC: 9.2 MG/DL (ref 8.3–10.6)
CHLORIDE SERPL-SCNC: 107 MMOL/L (ref 99–110)
CLARITY UR: CLEAR
CO2 SERPL-SCNC: 21 MMOL/L (ref 21–32)
COLOR UR: YELLOW
CREAT SERPL-MCNC: 2.3 MG/DL (ref 0.6–1.2)
CREAT UR-MCNC: 85.3 MG/DL (ref 28–259)
DEPRECATED RDW RBC AUTO: 16.7 % (ref 12.4–15.4)
EPI CELLS #/AREA URNS AUTO: 8 /HPF (ref 0–5)
GFR SERPLBLD CREATININE-BSD FMLA CKD-EPI: 22 ML/MIN/{1.73_M2}
GLUCOSE SERPL-MCNC: 136 MG/DL (ref 70–99)
GLUCOSE UR STRIP.AUTO-MCNC: NEGATIVE MG/DL
HCT VFR BLD AUTO: 27.6 % (ref 36–48)
HGB BLD-MCNC: 9.2 G/DL (ref 12–16)
HGB UR QL STRIP.AUTO: NEGATIVE
HYALINE CASTS #/AREA URNS AUTO: 0 /LPF (ref 0–8)
KETONES UR STRIP.AUTO-MCNC: NEGATIVE MG/DL
LEUKOCYTE ESTERASE UR QL STRIP.AUTO: ABNORMAL
MAGNESIUM SERPL-MCNC: 1.77 MG/DL (ref 1.8–2.4)
MCH RBC QN AUTO: 28.4 PG (ref 26–34)
MCHC RBC AUTO-ENTMCNC: 33.3 G/DL (ref 31–36)
MCV RBC AUTO: 85.3 FL (ref 80–100)
MICROALBUMIN UR DL<=1MG/L-MCNC: 140 MG/DL
MICROALBUMIN/CREAT UR: 1641.3 MG/G (ref 0–30)
NITRITE UR QL STRIP.AUTO: NEGATIVE
PH UR STRIP.AUTO: 5.5 [PH] (ref 5–8)
PHOSPHATE SERPL-MCNC: 3.7 MG/DL (ref 2.5–4.9)
PLATELET # BLD AUTO: 165 K/UL (ref 135–450)
PMV BLD AUTO: 9 FL (ref 5–10.5)
POTASSIUM SERPL-SCNC: 4.6 MMOL/L (ref 3.5–5.1)
PROT UR STRIP.AUTO-MCNC: 300 MG/DL
PROT UR-MCNC: 194 MG/DL
PROT/CREAT UR-RTO: 2.3 MG/DL
PTH-INTACT SERPL-MCNC: 192.4 PG/ML (ref 14–72)
RBC # BLD AUTO: 3.24 M/UL (ref 4–5.2)
RBC CLUMPS #/AREA URNS AUTO: 1 /HPF (ref 0–4)
SODIUM SERPL-SCNC: 139 MMOL/L (ref 136–145)
SP GR UR STRIP.AUTO: 1.01 (ref 1–1.03)
UA DIPSTICK W REFLEX MICRO PNL UR: YES
URN SPEC COLLECT METH UR: ABNORMAL
UROBILINOGEN UR STRIP-ACNC: 0.2 E.U./DL
WBC # BLD AUTO: 4.8 K/UL (ref 4–11)
WBC #/AREA URNS AUTO: 28 /HPF (ref 0–5)

## 2024-09-11 PROCEDURE — 83970 ASSAY OF PARATHORMONE: CPT

## 2024-09-11 PROCEDURE — 82043 UR ALBUMIN QUANTITATIVE: CPT

## 2024-09-11 PROCEDURE — 80069 RENAL FUNCTION PANEL: CPT

## 2024-09-11 PROCEDURE — 83735 ASSAY OF MAGNESIUM: CPT

## 2024-09-11 PROCEDURE — 82306 VITAMIN D 25 HYDROXY: CPT

## 2024-09-11 PROCEDURE — 80197 ASSAY OF TACROLIMUS: CPT

## 2024-09-11 PROCEDURE — 82570 ASSAY OF URINE CREATININE: CPT

## 2024-09-11 PROCEDURE — 81001 URINALYSIS AUTO W/SCOPE: CPT

## 2024-09-11 PROCEDURE — 36415 COLL VENOUS BLD VENIPUNCTURE: CPT

## 2024-09-11 PROCEDURE — 84156 ASSAY OF PROTEIN URINE: CPT

## 2024-09-11 PROCEDURE — 85027 COMPLETE CBC AUTOMATED: CPT

## 2024-09-12 LAB — TACROLIMUS BLOOD: 3.6 NG/ML (ref 5–20)

## 2024-09-23 ENCOUNTER — TELEPHONE (OUTPATIENT)
Dept: INFECTIOUS DISEASES | Age: 74
End: 2024-09-23

## 2024-10-17 ENCOUNTER — OFFICE VISIT (OUTPATIENT)
Dept: INFECTIOUS DISEASES | Age: 74
End: 2024-10-17
Payer: MEDICARE

## 2024-10-17 VITALS
DIASTOLIC BLOOD PRESSURE: 81 MMHG | OXYGEN SATURATION: 94 % | BODY MASS INDEX: 30.19 KG/M2 | HEIGHT: 64 IN | TEMPERATURE: 97.9 F | SYSTOLIC BLOOD PRESSURE: 134 MMHG | WEIGHT: 176.8 LBS | HEART RATE: 68 BPM

## 2024-10-17 DIAGNOSIS — Z87.441 HISTORY OF NEPHROTIC SYNDROME: ICD-10-CM

## 2024-10-17 DIAGNOSIS — E66.3 OVERWEIGHT (BMI 25.0-29.9): ICD-10-CM

## 2024-10-17 DIAGNOSIS — B25.9 CYTOMEGALOVIRUS INFECTION, UNSPECIFIED CYTOMEGALOVIRAL INFECTION TYPE (HCC): Primary | ICD-10-CM

## 2024-10-17 DIAGNOSIS — Z94.0 HISTORY OF RENAL TRANSPLANT: ICD-10-CM

## 2024-10-17 DIAGNOSIS — B25.9 CYTOMEGALOVIRUS (CMV) VIREMIA (HCC): ICD-10-CM

## 2024-10-17 DIAGNOSIS — E03.9 HYPOTHYROIDISM, UNSPECIFIED TYPE: ICD-10-CM

## 2024-10-17 DIAGNOSIS — E78.2 MIXED HYPERLIPIDEMIA: ICD-10-CM

## 2024-10-17 DIAGNOSIS — I10 ESSENTIAL HYPERTENSION: ICD-10-CM

## 2024-10-17 PROCEDURE — 99213 OFFICE O/P EST LOW 20 MIN: CPT | Performed by: INTERNAL MEDICINE

## 2024-10-17 PROCEDURE — 1123F ACP DISCUSS/DSCN MKR DOCD: CPT | Performed by: INTERNAL MEDICINE

## 2024-10-17 PROCEDURE — 3079F DIAST BP 80-89 MM HG: CPT | Performed by: INTERNAL MEDICINE

## 2024-10-17 PROCEDURE — 3075F SYST BP GE 130 - 139MM HG: CPT | Performed by: INTERNAL MEDICINE

## 2024-10-17 ASSESSMENT — ENCOUNTER SYMPTOMS
EYE DISCHARGE: 0
SINUS PRESSURE: 0
NAUSEA: 0
WHEEZING: 0
DIARRHEA: 0
BACK PAIN: 0
EYE REDNESS: 0
COUGH: 0
SHORTNESS OF BREATH: 0
RHINORRHEA: 0
SINUS PAIN: 0
CONSTIPATION: 0
SORE THROAT: 0
ABDOMINAL PAIN: 0

## 2024-10-17 NOTE — PROGRESS NOTES
Occupation: employed [employer]   Tobacco Use    Smoking status: Never    Smokeless tobacco: Never   Vaping Use    Vaping status: Never Used   Substance and Sexual Activity    Alcohol use: No    Drug use: No    Sexual activity: Not Currently     Partners: Male     Social Determinants of Health     Food Insecurity: No Food Insecurity (8/12/2024)    Hunger Vital Sign     Worried About Running Out of Food in the Last Year: Never true     Ran Out of Food in the Last Year: Never true   Transportation Needs: No Transportation Needs (8/12/2024)    PRAPARE - Transportation     Lack of Transportation (Medical): No     Lack of Transportation (Non-Medical): No   Housing Stability: Low Risk  (8/12/2024)    Housing Stability Vital Sign     Unable to Pay for Housing in the Last Year: No     Number of Times Moved in the Last Year: 1     Homeless in the Last Year: No       COVID VACCINATION AND LAB RESULT RECORDS:     Internal Administration   First Dose      Second Dose           Last COVID Lab SARS-CoV-2 RNA, RT PCR (no units)   Date Value   08/12/2024 NOT DETECTED            IMPRESSION:    The patient is a 74 y.o. old female who  has a past medical history of Clostridium difficile diarrhea, Esophageal reflux, Hyperlipidemia, Hypertension, Nephropathy, membranous, Nephrotic syndrome, and Thyroid disease. was seen today for following problems:    1. Cytomegalovirus infection, unspecified cytomegaloviral infection type (HCC)    2. Hypothyroidism, unspecified type    3. History of nephrotic syndrome    4. Overweight (BMI 25.0-29.9)    5. Essential hypertension    6. History of renal transplant    7. Mixed hyperlipidemia        Discussion:      The patient was diagnosed with diarrhea and CMV viremia in August.  She was started by me on a 3-week course of oral valganciclovir.    The patient had left-sided labs done on 9/11/2024.  Serum creatinine was 2.3.  White cell count was 4800 with hemoglobin of 9.2 and platelet count of

## 2024-10-21 LAB
CMV DNA SERPL NAA+PROBE-ACNC: 39 IU/ML
CMV DNA SERPL NAA+PROBE-LOG IU: 1.59 LOG IU/ML
CMV DNA SERPL QL NAA+PROBE: DETECTED

## 2025-01-20 ENCOUNTER — HOSPITAL ENCOUNTER (OUTPATIENT)
Age: 75
Discharge: HOME | End: 2025-01-20
Payer: MEDICARE

## 2025-01-20 DIAGNOSIS — B25.9 CYTOMEGALOVIRUS (CMV) VIREMIA (HCC): ICD-10-CM

## 2025-01-20 DIAGNOSIS — N18.4 CKD STAGE 4 SECONDARY TO HYPERTENSION (HCC): ICD-10-CM

## 2025-01-20 DIAGNOSIS — Z94.0 KIDNEY TRANSPLANT RECIPIENT: ICD-10-CM

## 2025-01-20 DIAGNOSIS — Z92.25 PERSONAL HISTORY OF IMMUNOSUPPRESSIVE THERAPY: ICD-10-CM

## 2025-01-20 DIAGNOSIS — I12.9 CKD STAGE 4 SECONDARY TO HYPERTENSION (HCC): ICD-10-CM

## 2025-01-20 LAB
25(OH)D3 SERPL-MCNC: 69.9 NG/ML
ALBUMIN SERPL-MCNC: 4.5 G/DL (ref 3.4–5)
ANION GAP SERPL CALCULATED.3IONS-SCNC: 17 MMOL/L (ref 3–16)
BACTERIA URNS QL MICRO: ABNORMAL /HPF
BILIRUB UR QL STRIP.AUTO: NEGATIVE
BUN SERPL-MCNC: 72 MG/DL (ref 7–20)
CALCIUM SERPL-MCNC: 9.2 MG/DL (ref 8.3–10.6)
CHLORIDE SERPL-SCNC: 104 MMOL/L (ref 99–110)
CLARITY UR: CLEAR
CO2 SERPL-SCNC: 17 MMOL/L (ref 21–32)
COLOR UR: YELLOW
CREAT SERPL-MCNC: 2.6 MG/DL (ref 0.6–1.2)
CREAT UR-MCNC: 49.4 MG/DL (ref 28–259)
DEPRECATED RDW RBC AUTO: 14.3 % (ref 12.4–15.4)
EPI CELLS #/AREA URNS AUTO: 3 /HPF (ref 0–5)
GFR SERPLBLD CREATININE-BSD FMLA CKD-EPI: 19 ML/MIN/{1.73_M2}
GLUCOSE SERPL-MCNC: 112 MG/DL (ref 70–99)
GLUCOSE UR STRIP.AUTO-MCNC: NEGATIVE MG/DL
HCT VFR BLD AUTO: 32 % (ref 36–48)
HGB BLD-MCNC: 10.3 G/DL (ref 12–16)
HGB UR QL STRIP.AUTO: NEGATIVE
HYALINE CASTS #/AREA URNS AUTO: 0 /LPF (ref 0–8)
KETONES UR STRIP.AUTO-MCNC: NEGATIVE MG/DL
LEUKOCYTE ESTERASE UR QL STRIP.AUTO: ABNORMAL
MCH RBC QN AUTO: 27.1 PG (ref 26–34)
MCHC RBC AUTO-ENTMCNC: 32 G/DL (ref 31–36)
MCV RBC AUTO: 84.6 FL (ref 80–100)
MICROALBUMIN UR DL<=1MG/L-MCNC: 52.7 MG/DL
MICROALBUMIN/CREAT UR: 1066.8 MG/G (ref 0–30)
NITRITE UR QL STRIP.AUTO: NEGATIVE
PH UR STRIP.AUTO: 5 [PH] (ref 5–8)
PHOSPHATE SERPL-MCNC: 5 MG/DL (ref 2.5–4.9)
PLATELET # BLD AUTO: 171 K/UL (ref 135–450)
PMV BLD AUTO: 8.9 FL (ref 5–10.5)
POTASSIUM SERPL-SCNC: 4.4 MMOL/L (ref 3.5–5.1)
PROT UR STRIP.AUTO-MCNC: 100 MG/DL
PROT UR-MCNC: 74.4 MG/DL
PROT/CREAT UR-RTO: 1.5 MG/DL
PTH-INTACT SERPL-MCNC: 164 PG/ML (ref 14–72)
RBC # BLD AUTO: 3.79 M/UL (ref 4–5.2)
RBC CLUMPS #/AREA URNS AUTO: 0 /HPF (ref 0–4)
SODIUM SERPL-SCNC: 138 MMOL/L (ref 136–145)
SP GR UR STRIP.AUTO: 1.01 (ref 1–1.03)
UA DIPSTICK W REFLEX MICRO PNL UR: YES
URN SPEC COLLECT METH UR: ABNORMAL
UROBILINOGEN UR STRIP-ACNC: 0.2 E.U./DL
WBC # BLD AUTO: 6.1 K/UL (ref 4–11)
WBC #/AREA URNS AUTO: 5 /HPF (ref 0–5)

## 2025-01-20 PROCEDURE — 81001 URINALYSIS AUTO W/SCOPE: CPT

## 2025-01-20 PROCEDURE — 82570 ASSAY OF URINE CREATININE: CPT

## 2025-01-20 PROCEDURE — 85027 COMPLETE CBC AUTOMATED: CPT

## 2025-01-20 PROCEDURE — 82306 VITAMIN D 25 HYDROXY: CPT

## 2025-01-20 PROCEDURE — 80069 RENAL FUNCTION PANEL: CPT

## 2025-01-20 PROCEDURE — 36415 COLL VENOUS BLD VENIPUNCTURE: CPT

## 2025-01-20 PROCEDURE — 82043 UR ALBUMIN QUANTITATIVE: CPT

## 2025-01-20 PROCEDURE — 84156 ASSAY OF PROTEIN URINE: CPT

## 2025-01-20 PROCEDURE — 83970 ASSAY OF PARATHORMONE: CPT

## 2025-02-10 ENCOUNTER — HOSPITAL ENCOUNTER (OUTPATIENT)
Age: 75
Discharge: HOME | End: 2025-02-10
Payer: MEDICARE

## 2025-02-10 DIAGNOSIS — Z94.0 KIDNEY TRANSPLANT RECIPIENT: ICD-10-CM

## 2025-02-10 PROCEDURE — 80197 ASSAY OF TACROLIMUS: CPT

## 2025-02-10 PROCEDURE — 36415 COLL VENOUS BLD VENIPUNCTURE: CPT

## 2025-02-11 LAB — TACROLIMUS BLOOD: 2.1 NG/ML (ref 5–20)

## 2025-03-13 ENCOUNTER — HOSPITAL ENCOUNTER (OUTPATIENT)
Age: 75
Discharge: HOME | End: 2025-03-13
Payer: MEDICARE

## 2025-03-13 DIAGNOSIS — I12.9 CKD STAGE 4 SECONDARY TO HYPERTENSION (HCC): ICD-10-CM

## 2025-03-13 DIAGNOSIS — N18.4 CKD STAGE 4 SECONDARY TO HYPERTENSION (HCC): ICD-10-CM

## 2025-03-13 DIAGNOSIS — Z94.0 KIDNEY TRANSPLANT RECIPIENT: ICD-10-CM

## 2025-03-13 LAB
ALBUMIN SERPL-MCNC: 4.2 G/DL (ref 3.4–5)
ANION GAP SERPL CALCULATED.3IONS-SCNC: 14 MMOL/L (ref 3–16)
BACTERIA URNS QL MICRO: ABNORMAL /HPF
BILIRUB UR QL STRIP.AUTO: NEGATIVE
BUN SERPL-MCNC: 23 MG/DL (ref 7–20)
CALCIUM SERPL-MCNC: 9.1 MG/DL (ref 8.3–10.6)
CHLORIDE SERPL-SCNC: 110 MMOL/L (ref 99–110)
CLARITY UR: CLEAR
CO2 SERPL-SCNC: 19 MMOL/L (ref 21–32)
COLOR UR: YELLOW
CREAT SERPL-MCNC: 1.6 MG/DL (ref 0.6–1.2)
DEPRECATED RDW RBC AUTO: 18.8 % (ref 12.4–15.4)
EPI CELLS #/AREA URNS AUTO: 2 /HPF (ref 0–5)
GFR SERPLBLD CREATININE-BSD FMLA CKD-EPI: 34 ML/MIN/{1.73_M2}
GLUCOSE SERPL-MCNC: 123 MG/DL (ref 70–99)
GLUCOSE UR STRIP.AUTO-MCNC: NEGATIVE MG/DL
HCT VFR BLD AUTO: 27.6 % (ref 36–48)
HGB BLD-MCNC: 8.5 G/DL (ref 12–16)
HGB UR QL STRIP.AUTO: NEGATIVE
HYALINE CASTS #/AREA URNS AUTO: 1 /LPF (ref 0–8)
KETONES UR STRIP.AUTO-MCNC: NEGATIVE MG/DL
LEUKOCYTE ESTERASE UR QL STRIP.AUTO: ABNORMAL
MAGNESIUM SERPL-MCNC: 1.81 MG/DL (ref 1.8–2.4)
MCH RBC QN AUTO: 27.6 PG (ref 26–34)
MCHC RBC AUTO-ENTMCNC: 30.9 G/DL (ref 31–36)
MCV RBC AUTO: 89.4 FL (ref 80–100)
NITRITE UR QL STRIP.AUTO: NEGATIVE
PH UR STRIP.AUTO: 5.5 [PH] (ref 5–8)
PHOSPHATE SERPL-MCNC: 3.3 MG/DL (ref 2.5–4.9)
PLATELET # BLD AUTO: 229 K/UL (ref 135–450)
PMV BLD AUTO: 8.6 FL (ref 5–10.5)
POTASSIUM SERPL-SCNC: 3.9 MMOL/L (ref 3.5–5.1)
PROT UR STRIP.AUTO-MCNC: 300 MG/DL
PTH-INTACT SERPL-MCNC: 188 PG/ML (ref 14–72)
RBC # BLD AUTO: 3.08 M/UL (ref 4–5.2)
RBC CLUMPS #/AREA URNS AUTO: 1 /HPF (ref 0–4)
SODIUM SERPL-SCNC: 143 MMOL/L (ref 136–145)
SP GR UR STRIP.AUTO: 1.01 (ref 1–1.03)
UA COMPLETE W REFLEX CULTURE PNL UR: ABNORMAL
UA DIPSTICK W REFLEX MICRO PNL UR: YES
URN SPEC COLLECT METH UR: ABNORMAL
UROBILINOGEN UR STRIP-ACNC: 0.2 E.U./DL
WBC # BLD AUTO: 3.8 K/UL (ref 4–11)
WBC #/AREA URNS AUTO: 9 /HPF (ref 0–5)

## 2025-03-13 PROCEDURE — 83970 ASSAY OF PARATHORMONE: CPT

## 2025-03-13 PROCEDURE — 80069 RENAL FUNCTION PANEL: CPT

## 2025-03-13 PROCEDURE — 36415 COLL VENOUS BLD VENIPUNCTURE: CPT

## 2025-03-13 PROCEDURE — 80197 ASSAY OF TACROLIMUS: CPT

## 2025-03-13 PROCEDURE — 83735 ASSAY OF MAGNESIUM: CPT

## 2025-03-13 PROCEDURE — 85027 COMPLETE CBC AUTOMATED: CPT

## 2025-03-13 PROCEDURE — 81001 URINALYSIS AUTO W/SCOPE: CPT

## 2025-03-14 LAB — TACROLIMUS BLOOD: 1 NG/ML (ref 5–20)

## 2025-04-23 ENCOUNTER — HOSPITAL ENCOUNTER (OUTPATIENT)
Age: 75
Discharge: HOME | End: 2025-04-23
Payer: MEDICARE

## 2025-04-23 DIAGNOSIS — N18.9 ANEMIA IN CHRONIC KIDNEY DISEASE, UNSPECIFIED CKD STAGE: ICD-10-CM

## 2025-04-23 DIAGNOSIS — N25.81 SECONDARY HYPERPARATHYROIDISM OF RENAL ORIGIN: ICD-10-CM

## 2025-04-23 DIAGNOSIS — E55.9 VITAMIN D DEFICIENCY: ICD-10-CM

## 2025-04-23 DIAGNOSIS — D63.1 ANEMIA IN CHRONIC KIDNEY DISEASE, UNSPECIFIED CKD STAGE: ICD-10-CM

## 2025-04-23 DIAGNOSIS — I12.9 CKD STAGE 4 SECONDARY TO HYPERTENSION (HCC): ICD-10-CM

## 2025-04-23 DIAGNOSIS — Z94.0 KIDNEY TRANSPLANT RECIPIENT: ICD-10-CM

## 2025-04-23 DIAGNOSIS — Z51.81 ENCOUNTER FOR MONITORING TACROLIMUS THERAPY: ICD-10-CM

## 2025-04-23 DIAGNOSIS — Z79.621 ENCOUNTER FOR MONITORING TACROLIMUS THERAPY: ICD-10-CM

## 2025-04-23 DIAGNOSIS — E66.9 OBESITY (BMI 30-39.9): ICD-10-CM

## 2025-04-23 DIAGNOSIS — N18.4 CKD STAGE 4 SECONDARY TO HYPERTENSION (HCC): ICD-10-CM

## 2025-04-23 DIAGNOSIS — I12.9 BENIGN HYPERTENSION WITH STAGE 3B CHRONIC KIDNEY DISEASE (HCC): ICD-10-CM

## 2025-04-23 DIAGNOSIS — N18.32 BENIGN HYPERTENSION WITH STAGE 3B CHRONIC KIDNEY DISEASE (HCC): ICD-10-CM

## 2025-04-23 DIAGNOSIS — R80.9 NEPHROTIC RANGE PROTEINURIA: ICD-10-CM

## 2025-04-23 LAB
ALBUMIN SERPL-MCNC: 4.1 G/DL (ref 3.4–5)
ANION GAP SERPL CALCULATED.3IONS-SCNC: 13 MMOL/L (ref 3–16)
BACTERIA URNS QL MICRO: ABNORMAL /HPF
BILIRUB UR QL STRIP.AUTO: NEGATIVE
BUN SERPL-MCNC: 42 MG/DL (ref 7–20)
CALCIUM SERPL-MCNC: 9.4 MG/DL (ref 8.3–10.6)
CHLORIDE SERPL-SCNC: 106 MMOL/L (ref 99–110)
CLARITY UR: CLEAR
CO2 SERPL-SCNC: 22 MMOL/L (ref 21–32)
COLOR UR: YELLOW
CREAT SERPL-MCNC: 1.9 MG/DL (ref 0.6–1.2)
CREAT UR-MCNC: 46.8 MG/DL (ref 28–259)
DEPRECATED RDW RBC AUTO: 14.2 % (ref 12.4–15.4)
EPI CELLS #/AREA URNS AUTO: 2 /HPF (ref 0–5)
GFR SERPLBLD CREATININE-BSD FMLA CKD-EPI: 27 ML/MIN/{1.73_M2}
GLUCOSE SERPL-MCNC: 142 MG/DL (ref 70–99)
GLUCOSE UR STRIP.AUTO-MCNC: NEGATIVE MG/DL
HCT VFR BLD AUTO: 36.5 % (ref 36–48)
HGB BLD-MCNC: 12.1 G/DL (ref 12–16)
HGB UR QL STRIP.AUTO: NEGATIVE
HYALINE CASTS #/AREA URNS AUTO: 0 /LPF (ref 0–8)
IRON SATN MFR SERPL: 29 % (ref 15–50)
IRON SERPL-MCNC: 78 UG/DL (ref 37–145)
KETONES UR STRIP.AUTO-MCNC: NEGATIVE MG/DL
LEUKOCYTE ESTERASE UR QL STRIP.AUTO: ABNORMAL
MAGNESIUM SERPL-MCNC: 2.2 MG/DL (ref 1.8–2.4)
MCH RBC QN AUTO: 28 PG (ref 26–34)
MCHC RBC AUTO-ENTMCNC: 33 G/DL (ref 31–36)
MCV RBC AUTO: 84.6 FL (ref 80–100)
NITRITE UR QL STRIP.AUTO: NEGATIVE
PH UR STRIP.AUTO: 6 [PH] (ref 5–8)
PHOSPHATE SERPL-MCNC: 3.8 MG/DL (ref 2.5–4.9)
PLATELET # BLD AUTO: 179 K/UL (ref 135–450)
PMV BLD AUTO: 9 FL (ref 5–10.5)
POTASSIUM SERPL-SCNC: 4.5 MMOL/L (ref 3.5–5.1)
PROT UR STRIP.AUTO-MCNC: 300 MG/DL
PROT UR-MCNC: 311 MG/DL
PROT/CREAT UR-RTO: 6.6 MG/DL
PTH-INTACT SERPL-MCNC: 215 PG/ML (ref 14–72)
RBC # BLD AUTO: 4.31 M/UL (ref 4–5.2)
RBC CLUMPS #/AREA URNS AUTO: 1 /HPF (ref 0–4)
SODIUM SERPL-SCNC: 141 MMOL/L (ref 136–145)
SP GR UR STRIP.AUTO: 1.01 (ref 1–1.03)
TIBC SERPL-MCNC: 265 UG/DL (ref 260–445)
UA COMPLETE W REFLEX CULTURE PNL UR: ABNORMAL
UA DIPSTICK W REFLEX MICRO PNL UR: YES
URN SPEC COLLECT METH UR: ABNORMAL
UROBILINOGEN UR STRIP-ACNC: 0.2 E.U./DL
WBC # BLD AUTO: 3.6 K/UL (ref 4–11)
WBC #/AREA URNS AUTO: 7 /HPF (ref 0–5)

## 2025-04-23 PROCEDURE — 83970 ASSAY OF PARATHORMONE: CPT

## 2025-04-23 PROCEDURE — 81001 URINALYSIS AUTO W/SCOPE: CPT

## 2025-04-23 PROCEDURE — 85027 COMPLETE CBC AUTOMATED: CPT

## 2025-04-23 PROCEDURE — 80197 ASSAY OF TACROLIMUS: CPT

## 2025-04-23 PROCEDURE — 84156 ASSAY OF PROTEIN URINE: CPT

## 2025-04-23 PROCEDURE — 36415 COLL VENOUS BLD VENIPUNCTURE: CPT

## 2025-04-23 PROCEDURE — 82570 ASSAY OF URINE CREATININE: CPT

## 2025-04-23 PROCEDURE — 83550 IRON BINDING TEST: CPT

## 2025-04-23 PROCEDURE — 80069 RENAL FUNCTION PANEL: CPT

## 2025-04-23 PROCEDURE — 83540 ASSAY OF IRON: CPT

## 2025-04-23 PROCEDURE — 83735 ASSAY OF MAGNESIUM: CPT

## 2025-04-24 LAB — TACROLIMUS BLOOD: 1.8 NG/ML (ref 5–20)

## 2025-05-07 ENCOUNTER — HOSPITAL ENCOUNTER (OUTPATIENT)
Age: 75
Discharge: HOME | End: 2025-05-07
Payer: MEDICARE

## 2025-05-07 DIAGNOSIS — R80.9 NEPHROTIC RANGE PROTEINURIA: ICD-10-CM

## 2025-05-07 DIAGNOSIS — N17.9 ACUTE KIDNEY INJURY SUPERIMPOSED ON CHRONIC KIDNEY DISEASE: ICD-10-CM

## 2025-05-07 DIAGNOSIS — E83.42 HYPOMAGNESEMIA: ICD-10-CM

## 2025-05-07 DIAGNOSIS — I12.9 CKD STAGE 4 SECONDARY TO HYPERTENSION (HCC): ICD-10-CM

## 2025-05-07 DIAGNOSIS — N18.9 ACUTE KIDNEY INJURY SUPERIMPOSED ON CHRONIC KIDNEY DISEASE: ICD-10-CM

## 2025-05-07 DIAGNOSIS — Z94.0 KIDNEY TRANSPLANT RECIPIENT: ICD-10-CM

## 2025-05-07 DIAGNOSIS — N18.4 CKD STAGE 4 SECONDARY TO HYPERTENSION (HCC): ICD-10-CM

## 2025-05-07 LAB
25(OH)D3 SERPL-MCNC: 34.6 NG/ML
BACTERIA URNS QL MICRO: NORMAL /HPF
BILIRUB UR QL STRIP.AUTO: NEGATIVE
CLARITY UR: CLEAR
COLOR UR: YELLOW
CREAT UR-MCNC: 32.8 MG/DL (ref 28–259)
DEPRECATED RDW RBC AUTO: 14.7 % (ref 12.4–15.4)
EPI CELLS #/AREA URNS AUTO: 2 /HPF (ref 0–5)
GLUCOSE UR STRIP.AUTO-MCNC: NEGATIVE MG/DL
HCT VFR BLD AUTO: 37.1 % (ref 36–48)
HGB BLD-MCNC: 11.8 G/DL (ref 12–16)
HGB UR QL STRIP.AUTO: NEGATIVE
HYALINE CASTS #/AREA URNS AUTO: 2 /LPF (ref 0–8)
KETONES UR STRIP.AUTO-MCNC: NEGATIVE MG/DL
LEUKOCYTE ESTERASE UR QL STRIP.AUTO: NEGATIVE
MCH RBC QN AUTO: 27.4 PG (ref 26–34)
MCHC RBC AUTO-ENTMCNC: 31.8 G/DL (ref 31–36)
MCV RBC AUTO: 86.1 FL (ref 80–100)
MICROALBUMIN UR DL<=1MG/L-MCNC: 245 MG/DL
MICROALBUMIN/CREAT UR: 7469.5 MG/G (ref 0–30)
NITRITE UR QL STRIP.AUTO: NEGATIVE
PH UR STRIP.AUTO: 6 [PH] (ref 5–8)
PLATELET # BLD AUTO: 179 K/UL (ref 135–450)
PMV BLD AUTO: 8.2 FL (ref 5–10.5)
PROT UR STRIP.AUTO-MCNC: 300 MG/DL
PROT UR-MCNC: 328 MG/DL
PROT/CREAT UR-RTO: 10 MG/DL
PTH-INTACT SERPL-MCNC: 181 PG/ML (ref 14–72)
RBC # BLD AUTO: 4.31 M/UL (ref 4–5.2)
RBC CLUMPS #/AREA URNS AUTO: 1 /HPF (ref 0–4)
SP GR UR STRIP.AUTO: 1.01 (ref 1–1.03)
UA COMPLETE W REFLEX CULTURE PNL UR: NORMAL
UA DIPSTICK W REFLEX MICRO PNL UR: YES
URN SPEC COLLECT METH UR: NORMAL
UROBILINOGEN UR STRIP-ACNC: 0.2 E.U./DL
WBC # BLD AUTO: 4 K/UL (ref 4–11)
WBC #/AREA URNS AUTO: 2 /HPF (ref 0–5)

## 2025-05-07 PROCEDURE — 82570 ASSAY OF URINE CREATININE: CPT

## 2025-05-07 PROCEDURE — 83970 ASSAY OF PARATHORMONE: CPT

## 2025-05-07 PROCEDURE — 81001 URINALYSIS AUTO W/SCOPE: CPT

## 2025-05-07 PROCEDURE — 84156 ASSAY OF PROTEIN URINE: CPT

## 2025-05-07 PROCEDURE — 36415 COLL VENOUS BLD VENIPUNCTURE: CPT

## 2025-05-07 PROCEDURE — 82043 UR ALBUMIN QUANTITATIVE: CPT

## 2025-05-07 PROCEDURE — 85027 COMPLETE CBC AUTOMATED: CPT

## 2025-05-07 PROCEDURE — 80069 RENAL FUNCTION PANEL: CPT

## 2025-05-07 PROCEDURE — 82306 VITAMIN D 25 HYDROXY: CPT

## 2025-05-07 PROCEDURE — 80197 ASSAY OF TACROLIMUS: CPT

## 2025-05-07 PROCEDURE — 83735 ASSAY OF MAGNESIUM: CPT

## 2025-05-08 LAB
ALBUMIN SERPL-MCNC: 4 G/DL (ref 3.4–5)
ANION GAP SERPL CALCULATED.3IONS-SCNC: 12 MMOL/L (ref 3–16)
BUN SERPL-MCNC: 37 MG/DL (ref 7–20)
CALCIUM SERPL-MCNC: 9.2 MG/DL (ref 8.3–10.6)
CHLORIDE SERPL-SCNC: 109 MMOL/L (ref 99–110)
CO2 SERPL-SCNC: 22 MMOL/L (ref 21–32)
CREAT SERPL-MCNC: 1.7 MG/DL (ref 0.6–1.2)
GFR SERPLBLD CREATININE-BSD FMLA CKD-EPI: 31 ML/MIN/{1.73_M2}
GLUCOSE SERPL-MCNC: 119 MG/DL (ref 70–99)
MAGNESIUM SERPL-MCNC: 2.3 MG/DL (ref 1.8–2.4)
PHOSPHATE SERPL-MCNC: 3.8 MG/DL (ref 2.5–4.9)
POTASSIUM SERPL-SCNC: 4.5 MMOL/L (ref 3.5–5.1)
SODIUM SERPL-SCNC: 143 MMOL/L (ref 136–145)
TACROLIMUS BLOOD: 2.5 NG/ML (ref 5–20)

## 2025-05-30 ENCOUNTER — HOSPITAL ENCOUNTER (OUTPATIENT)
Age: 75
Discharge: HOME | End: 2025-05-30
Payer: MEDICARE

## 2025-05-30 DIAGNOSIS — I12.9 CKD STAGE 4 SECONDARY TO HYPERTENSION (HCC): ICD-10-CM

## 2025-05-30 DIAGNOSIS — R80.9 NEPHROTIC RANGE PROTEINURIA: ICD-10-CM

## 2025-05-30 DIAGNOSIS — N17.9 ACUTE KIDNEY INJURY SUPERIMPOSED ON CHRONIC KIDNEY DISEASE: ICD-10-CM

## 2025-05-30 DIAGNOSIS — N18.4 CKD STAGE 4 SECONDARY TO HYPERTENSION (HCC): ICD-10-CM

## 2025-05-30 DIAGNOSIS — Z94.0 KIDNEY TRANSPLANT RECIPIENT: ICD-10-CM

## 2025-05-30 DIAGNOSIS — R60.0 EDEMA LEG: ICD-10-CM

## 2025-05-30 DIAGNOSIS — N18.9 ACUTE KIDNEY INJURY SUPERIMPOSED ON CHRONIC KIDNEY DISEASE: ICD-10-CM

## 2025-05-30 LAB
ALBUMIN SERPL-MCNC: 4 G/DL (ref 3.4–5)
ANION GAP SERPL CALCULATED.3IONS-SCNC: 13 MMOL/L (ref 3–16)
BUN SERPL-MCNC: 60 MG/DL (ref 7–20)
CALCIUM SERPL-MCNC: 9.3 MG/DL (ref 8.3–10.6)
CHLORIDE SERPL-SCNC: 107 MMOL/L (ref 99–110)
CO2 SERPL-SCNC: 22 MMOL/L (ref 21–32)
COLLECT DURATION TIME UR: 24 HR
CREAT 24H UR-MRATE: 0.9 G/24HR (ref 0.6–1.5)
CREAT CL 24H UR+SERPL-VRATE: 27 ML/MIN (ref 88–128)
CREAT SERPL-MCNC: 2.1 MG/DL (ref 0.6–1.2)
CREAT SERPL-MCNC: 2.1 MG/DL (ref 0.6–1.2)
DEPRECATED RDW RBC AUTO: 14.8 % (ref 12.4–15.4)
GFR SERPLBLD CREATININE-BSD FMLA CKD-EPI: 24 ML/MIN/{1.73_M2}
GLUCOSE SERPL-MCNC: 141 MG/DL (ref 70–99)
HCT VFR BLD AUTO: 41.5 % (ref 36–48)
HGB BLD-MCNC: 13 G/DL (ref 12–16)
MAGNESIUM SERPL-MCNC: 2.27 MG/DL (ref 1.8–2.4)
MCH RBC QN AUTO: 27.2 PG (ref 26–34)
MCHC RBC AUTO-ENTMCNC: 31.4 G/DL (ref 31–36)
MCV RBC AUTO: 86.6 FL (ref 80–100)
MICROALBUMIN 24H UR-MRATE: 3040 MG/DAY (ref 0–30)
MICROALBUMIN UG/MIN 24H UR-MRATE: 2111.1 MCG/MIN (ref 0–20)
PHOSPHATE SERPL-MCNC: 4.3 MG/DL (ref 2.5–4.9)
PLATELET # BLD AUTO: 220 K/UL (ref 135–450)
PMV BLD AUTO: 8.5 FL (ref 5–10.5)
POTASSIUM SERPL-SCNC: 4.8 MMOL/L (ref 3.5–5.1)
PROT 24H UR-MRATE: 4.08 G/24HR
PTH-INTACT SERPL-MCNC: 197 PG/ML (ref 14–72)
RBC # BLD AUTO: 4.79 M/UL (ref 4–5.2)
SODIUM SERPL-SCNC: 142 MMOL/L (ref 136–145)
SPECIMEN VOL 24H UR: 1600 ML
WBC # BLD AUTO: 4.3 K/UL (ref 4–11)

## 2025-05-30 PROCEDURE — 82043 UR ALBUMIN QUANTITATIVE: CPT

## 2025-05-30 PROCEDURE — 86335 IMMUNFIX E-PHORSIS/URINE/CSF: CPT

## 2025-05-30 PROCEDURE — 83735 ASSAY OF MAGNESIUM: CPT

## 2025-05-30 PROCEDURE — 82306 VITAMIN D 25 HYDROXY: CPT

## 2025-05-30 PROCEDURE — 80069 RENAL FUNCTION PANEL: CPT

## 2025-05-30 PROCEDURE — 85027 COMPLETE CBC AUTOMATED: CPT

## 2025-05-30 PROCEDURE — 84156 ASSAY OF PROTEIN URINE: CPT

## 2025-05-30 PROCEDURE — 83970 ASSAY OF PARATHORMONE: CPT

## 2025-05-30 PROCEDURE — 84166 PROTEIN E-PHORESIS/URINE/CSF: CPT

## 2025-05-30 PROCEDURE — 82575 CREATININE CLEARANCE TEST: CPT

## 2025-05-30 PROCEDURE — 36415 COLL VENOUS BLD VENIPUNCTURE: CPT

## 2025-05-31 LAB — 25(OH)D3 SERPL-MCNC: 26.6 NG/ML

## 2025-06-01 LAB — URN SPEC COLLECT METH UR: NORMAL

## 2025-06-02 ENCOUNTER — HOSPITAL ENCOUNTER (OUTPATIENT)
Age: 75
Setting detail: SPECIMEN
Discharge: HOME | End: 2025-06-02
Payer: MEDICARE

## 2025-06-02 LAB
BACTERIA URNS QL MICRO: ABNORMAL /HPF
BILIRUB UR QL STRIP.AUTO: NEGATIVE
CLARITY UR: CLEAR
COLOR UR: YELLOW
EPI CELLS #/AREA URNS AUTO: 1 /HPF (ref 0–5)
GLUCOSE UR STRIP.AUTO-MCNC: NEGATIVE MG/DL
HGB UR QL STRIP.AUTO: NEGATIVE
HYALINE CASTS #/AREA URNS AUTO: 1 /LPF (ref 0–8)
KETONES UR STRIP.AUTO-MCNC: NEGATIVE MG/DL
LEUKOCYTE ESTERASE UR QL STRIP.AUTO: ABNORMAL
NITRITE UR QL STRIP.AUTO: NEGATIVE
PH UR STRIP.AUTO: 5.5 [PH] (ref 5–8)
PROT PATTERN UR ELPH-IMP: NORMAL
PROT UR STRIP.AUTO-MCNC: 300 MG/DL
RBC CLUMPS #/AREA URNS AUTO: 3 /HPF (ref 0–4)
SP GR UR STRIP.AUTO: 1.01 (ref 1–1.03)
UA COMPLETE W REFLEX CULTURE PNL UR: ABNORMAL
UA DIPSTICK W REFLEX MICRO PNL UR: YES
URN SPEC COLLECT METH UR: ABNORMAL
UROBILINOGEN UR STRIP-ACNC: 0.2 E.U./DL
WBC #/AREA URNS AUTO: 8 /HPF (ref 0–5)

## 2025-06-02 PROCEDURE — 81001 URINALYSIS AUTO W/SCOPE: CPT

## 2025-06-03 LAB — TACROLIMUS BLOOD: 1.9 NG/ML (ref 5–20)

## 2025-08-08 ENCOUNTER — HOSPITAL ENCOUNTER (OUTPATIENT)
Dept: OTHER | Age: 75
Discharge: HOME | End: 2025-08-08
Payer: MEDICARE

## 2025-08-08 DIAGNOSIS — N18.9 ACUTE KIDNEY INJURY SUPERIMPOSED ON CHRONIC KIDNEY DISEASE: ICD-10-CM

## 2025-08-08 DIAGNOSIS — N18.4 CKD STAGE 4 SECONDARY TO HYPERTENSION (HCC): ICD-10-CM

## 2025-08-08 DIAGNOSIS — Z94.0 KIDNEY TRANSPLANT RECIPIENT: ICD-10-CM

## 2025-08-08 DIAGNOSIS — E83.42 HYPOMAGNESEMIA: ICD-10-CM

## 2025-08-08 DIAGNOSIS — N17.9 ACUTE KIDNEY INJURY SUPERIMPOSED ON CHRONIC KIDNEY DISEASE: ICD-10-CM

## 2025-08-08 DIAGNOSIS — R80.9 NEPHROTIC RANGE PROTEINURIA: ICD-10-CM

## 2025-08-08 DIAGNOSIS — I12.9 CKD STAGE 4 SECONDARY TO HYPERTENSION (HCC): ICD-10-CM

## 2025-08-08 LAB
25(OH)D3 SERPL-MCNC: 49.7 NG/ML
ALBUMIN SERPL-MCNC: 4.2 G/DL (ref 3.4–5)
ANION GAP SERPL CALCULATED.3IONS-SCNC: 13 MMOL/L (ref 3–16)
BACTERIA URNS QL MICRO: ABNORMAL /HPF
BILIRUB UR QL STRIP.AUTO: NEGATIVE
BUN SERPL-MCNC: 49 MG/DL (ref 7–20)
CALCIUM SERPL-MCNC: 9.5 MG/DL (ref 8.3–10.6)
CHLORIDE SERPL-SCNC: 105 MMOL/L (ref 99–110)
CLARITY UR: CLEAR
CO2 SERPL-SCNC: 22 MMOL/L (ref 21–32)
COLOR UR: YELLOW
CREAT SERPL-MCNC: 2 MG/DL (ref 0.6–1.2)
CREAT UR-MCNC: 27.7 MG/DL (ref 28–259)
DEPRECATED RDW RBC AUTO: 15.4 % (ref 12.4–15.4)
EPI CELLS #/AREA URNS AUTO: 6 /HPF (ref 0–5)
GFR SERPLBLD CREATININE-BSD FMLA CKD-EPI: 26 ML/MIN/{1.73_M2}
GLUCOSE SERPL-MCNC: 143 MG/DL (ref 70–99)
GLUCOSE UR STRIP.AUTO-MCNC: NEGATIVE MG/DL
HCT VFR BLD AUTO: 31.1 % (ref 36–48)
HGB BLD-MCNC: 10.2 G/DL (ref 12–16)
HGB UR QL STRIP.AUTO: NEGATIVE
HYALINE CASTS #/AREA URNS AUTO: 2 /LPF (ref 0–8)
KETONES UR STRIP.AUTO-MCNC: NEGATIVE MG/DL
LEUKOCYTE ESTERASE UR QL STRIP.AUTO: ABNORMAL
MAGNESIUM SERPL-MCNC: 1.98 MG/DL (ref 1.8–2.4)
MCH RBC QN AUTO: 27.6 PG (ref 26–34)
MCHC RBC AUTO-ENTMCNC: 33 G/DL (ref 31–36)
MCV RBC AUTO: 83.5 FL (ref 80–100)
MICROALBUMIN UR DL<=1MG/L-MCNC: 42.4 MG/DL
MICROALBUMIN/CREAT UR: 1530.7 MG/G (ref 0–30)
NITRITE UR QL STRIP.AUTO: NEGATIVE
PH UR STRIP.AUTO: 5 [PH] (ref 5–8)
PHOSPHATE SERPL-MCNC: 3.7 MG/DL (ref 2.5–4.9)
PLATELET # BLD AUTO: 195 K/UL (ref 135–450)
PMV BLD AUTO: 8.8 FL (ref 5–10.5)
POTASSIUM SERPL-SCNC: 4.5 MMOL/L (ref 3.5–5.1)
PROT UR STRIP.AUTO-MCNC: 100 MG/DL
PROT UR-MCNC: 61.6 MG/DL
PROT/CREAT UR-RTO: 2.2 MG/DL
PTH-INTACT SERPL-MCNC: 179 PG/ML (ref 14–72)
RBC # BLD AUTO: 3.72 M/UL (ref 4–5.2)
RBC CLUMPS #/AREA URNS AUTO: 1 /HPF (ref 0–4)
SODIUM SERPL-SCNC: 140 MMOL/L (ref 136–145)
SP GR UR STRIP.AUTO: 1.01 (ref 1–1.03)
UA COMPLETE W REFLEX CULTURE PNL UR: YES
UA DIPSTICK W REFLEX MICRO PNL UR: YES
URATE SERPL-MCNC: 7.9 MG/DL (ref 2.6–6)
URN SPEC COLLECT METH UR: ABNORMAL
UROBILINOGEN UR STRIP-ACNC: 0.2 E.U./DL
WBC # BLD AUTO: 4.6 K/UL (ref 4–11)
WBC #/AREA URNS AUTO: 39 /HPF (ref 0–5)

## 2025-08-08 PROCEDURE — 82570 ASSAY OF URINE CREATININE: CPT

## 2025-08-08 PROCEDURE — 82306 VITAMIN D 25 HYDROXY: CPT

## 2025-08-08 PROCEDURE — 84550 ASSAY OF BLOOD/URIC ACID: CPT

## 2025-08-08 PROCEDURE — 82043 UR ALBUMIN QUANTITATIVE: CPT

## 2025-08-08 PROCEDURE — 83735 ASSAY OF MAGNESIUM: CPT

## 2025-08-08 PROCEDURE — 80069 RENAL FUNCTION PANEL: CPT

## 2025-08-08 PROCEDURE — 81001 URINALYSIS AUTO W/SCOPE: CPT

## 2025-08-08 PROCEDURE — 84156 ASSAY OF PROTEIN URINE: CPT

## 2025-08-08 PROCEDURE — 83970 ASSAY OF PARATHORMONE: CPT

## 2025-08-08 PROCEDURE — 36415 COLL VENOUS BLD VENIPUNCTURE: CPT

## 2025-08-08 PROCEDURE — 87086 URINE CULTURE/COLONY COUNT: CPT

## 2025-08-08 PROCEDURE — 85027 COMPLETE CBC AUTOMATED: CPT

## 2025-08-09 LAB — BACTERIA UR CULT: NORMAL

## 2025-08-11 LAB — TACROLIMUS BLOOD: 8.5 NG/ML (ref 5–20)

## 2025-09-05 ENCOUNTER — HOSPITAL ENCOUNTER (OUTPATIENT)
Age: 75
Discharge: HOME | End: 2025-09-05
Payer: MEDICARE

## 2025-09-05 DIAGNOSIS — Z94.0 KIDNEY TRANSPLANT RECIPIENT: ICD-10-CM

## 2025-09-05 DIAGNOSIS — Z92.25 PERSONAL HISTORY OF IMMUNOSUPPRESSIVE THERAPY: ICD-10-CM

## 2025-09-05 DIAGNOSIS — I12.9 CKD STAGE 4 SECONDARY TO HYPERTENSION (HCC): ICD-10-CM

## 2025-09-05 DIAGNOSIS — R80.9 NEPHROTIC RANGE PROTEINURIA: ICD-10-CM

## 2025-09-05 DIAGNOSIS — M1A.9XX1 CHRONIC GOUT INVOLVING TOE OF LEFT FOOT WITH TOPHUS, UNSPECIFIED CAUSE: ICD-10-CM

## 2025-09-05 DIAGNOSIS — E83.42 HYPOMAGNESEMIA: ICD-10-CM

## 2025-09-05 DIAGNOSIS — N18.4 CKD STAGE 4 SECONDARY TO HYPERTENSION (HCC): ICD-10-CM

## 2025-09-05 DIAGNOSIS — B25.9 CYTOMEGALOVIRUS (CMV) VIREMIA (HCC): ICD-10-CM

## 2025-09-05 LAB
ALBUMIN SERPL-MCNC: 4.4 G/DL (ref 3.4–5)
ANION GAP SERPL CALCULATED.3IONS-SCNC: 16 MMOL/L (ref 3–16)
BACTERIA URNS QL MICRO: ABNORMAL /HPF
BILIRUB UR QL STRIP.AUTO: NEGATIVE
BUN SERPL-MCNC: 44 MG/DL (ref 7–20)
CALCIUM SERPL-MCNC: 9.2 MG/DL (ref 8.3–10.6)
CHLORIDE SERPL-SCNC: 105 MMOL/L (ref 99–110)
CLARITY UR: CLEAR
CO2 SERPL-SCNC: 20 MMOL/L (ref 21–32)
COLOR UR: YELLOW
CREAT SERPL-MCNC: 2.3 MG/DL (ref 0.6–1.2)
CREAT UR-MCNC: 41.8 MG/DL (ref 28–259)
DEPRECATED RDW RBC AUTO: 15.3 % (ref 12.4–15.4)
EPI CELLS #/AREA URNS AUTO: 4 /HPF (ref 0–5)
GFR SERPLBLD CREATININE-BSD FMLA CKD-EPI: 22 ML/MIN/{1.73_M2}
GLUCOSE SERPL-MCNC: 128 MG/DL (ref 70–99)
GLUCOSE UR STRIP.AUTO-MCNC: NEGATIVE MG/DL
HCT VFR BLD AUTO: 32.9 % (ref 36–48)
HGB BLD-MCNC: 10.5 G/DL (ref 12–16)
HGB UR QL STRIP.AUTO: NEGATIVE
HYALINE CASTS #/AREA URNS AUTO: 2 /LPF (ref 0–8)
KETONES UR STRIP.AUTO-MCNC: NEGATIVE MG/DL
LEUKOCYTE ESTERASE UR QL STRIP.AUTO: ABNORMAL
MAGNESIUM SERPL-MCNC: 1.87 MG/DL (ref 1.8–2.4)
MCH RBC QN AUTO: 27.4 PG (ref 26–34)
MCHC RBC AUTO-ENTMCNC: 32 G/DL (ref 31–36)
MCV RBC AUTO: 85.7 FL (ref 80–100)
MICROALBUMIN UR DL<=1MG/L-MCNC: 66.5 MG/DL
MICROALBUMIN/CREAT UR: 1590.9 MG/G (ref 0–30)
NITRITE UR QL STRIP.AUTO: NEGATIVE
PH UR STRIP.AUTO: 5 [PH] (ref 5–8)
PHOSPHATE SERPL-MCNC: 4 MG/DL (ref 2.5–4.9)
PLATELET # BLD AUTO: 199 K/UL (ref 135–450)
PMV BLD AUTO: 9 FL (ref 5–10.5)
POTASSIUM SERPL-SCNC: 4.2 MMOL/L (ref 3.5–5.1)
PROT UR STRIP.AUTO-MCNC: 100 MG/DL
PROT UR-MCNC: 87.7 MG/DL
PROT/CREAT UR-RTO: 2.1 MG/DL
PTH-INTACT SERPL-MCNC: 226 PG/ML (ref 14–72)
RBC # BLD AUTO: 3.84 M/UL (ref 4–5.2)
RBC CLUMPS #/AREA URNS AUTO: 1 /HPF (ref 0–4)
SODIUM SERPL-SCNC: 141 MMOL/L (ref 136–145)
SP GR UR STRIP.AUTO: 1.01 (ref 1–1.03)
UA COMPLETE W REFLEX CULTURE PNL UR: YES
UA DIPSTICK W REFLEX MICRO PNL UR: YES
URATE SERPL-MCNC: 6.6 MG/DL (ref 2.6–6)
URN SPEC COLLECT METH UR: ABNORMAL
UROBILINOGEN UR STRIP-ACNC: 0.2 E.U./DL
WBC # BLD AUTO: 4.4 K/UL (ref 4–11)
WBC #/AREA URNS AUTO: 30 /HPF (ref 0–5)

## 2025-09-05 PROCEDURE — 80069 RENAL FUNCTION PANEL: CPT

## 2025-09-05 PROCEDURE — 82043 UR ALBUMIN QUANTITATIVE: CPT

## 2025-09-05 PROCEDURE — 84156 ASSAY OF PROTEIN URINE: CPT

## 2025-09-05 PROCEDURE — 84550 ASSAY OF BLOOD/URIC ACID: CPT

## 2025-09-05 PROCEDURE — 83970 ASSAY OF PARATHORMONE: CPT

## 2025-09-05 PROCEDURE — 82570 ASSAY OF URINE CREATININE: CPT

## 2025-09-05 PROCEDURE — 36415 COLL VENOUS BLD VENIPUNCTURE: CPT

## 2025-09-05 PROCEDURE — 85027 COMPLETE CBC AUTOMATED: CPT

## 2025-09-05 PROCEDURE — 81001 URINALYSIS AUTO W/SCOPE: CPT

## 2025-09-05 PROCEDURE — 83735 ASSAY OF MAGNESIUM: CPT

## 2025-09-05 PROCEDURE — 82306 VITAMIN D 25 HYDROXY: CPT

## 2025-09-05 PROCEDURE — 87086 URINE CULTURE/COLONY COUNT: CPT

## 2025-09-06 LAB
25(OH)D3 SERPL-MCNC: 40.6 NG/ML
BACTERIA UR CULT: NORMAL